# Patient Record
Sex: FEMALE | Race: OTHER | HISPANIC OR LATINO | Employment: PART TIME | URBAN - METROPOLITAN AREA
[De-identification: names, ages, dates, MRNs, and addresses within clinical notes are randomized per-mention and may not be internally consistent; named-entity substitution may affect disease eponyms.]

---

## 2018-04-05 ENCOUNTER — OFFICE VISIT (OUTPATIENT)
Dept: FAMILY MEDICINE CLINIC | Facility: CLINIC | Age: 38
End: 2018-04-05
Payer: COMMERCIAL

## 2018-04-05 VITALS
RESPIRATION RATE: 14 BRPM | HEART RATE: 66 BPM | HEIGHT: 64 IN | SYSTOLIC BLOOD PRESSURE: 130 MMHG | BODY MASS INDEX: 32.67 KG/M2 | TEMPERATURE: 97.2 F | WEIGHT: 191.38 LBS | DIASTOLIC BLOOD PRESSURE: 98 MMHG

## 2018-04-05 DIAGNOSIS — R63.5 WEIGHT GAIN: ICD-10-CM

## 2018-04-05 DIAGNOSIS — K50.919 CROHN'S DISEASE WITH COMPLICATION, UNSPECIFIED GASTROINTESTINAL TRACT LOCATION (HCC): Primary | ICD-10-CM

## 2018-04-05 DIAGNOSIS — G47.00 INSOMNIA, UNSPECIFIED TYPE: ICD-10-CM

## 2018-04-05 DIAGNOSIS — K58.2 IRRITABLE BOWEL SYNDROME WITH BOTH CONSTIPATION AND DIARRHEA: ICD-10-CM

## 2018-04-05 DIAGNOSIS — K21.9 GASTROESOPHAGEAL REFLUX DISEASE WITHOUT ESOPHAGITIS: ICD-10-CM

## 2018-04-05 PROBLEM — K85.90 PANCREATITIS: Status: ACTIVE | Noted: 2017-05-07

## 2018-04-05 PROBLEM — K50.90 CROHN DISEASE (HCC): Status: ACTIVE | Noted: 2017-05-07

## 2018-04-05 PROBLEM — K58.9 IBS (IRRITABLE BOWEL SYNDROME): Status: ACTIVE | Noted: 2018-01-24

## 2018-04-05 PROCEDURE — 99204 OFFICE O/P NEW MOD 45 MIN: CPT | Performed by: FAMILY MEDICINE

## 2018-04-05 RX ORDER — DICYCLOMINE HCL 20 MG
20 TABLET ORAL EVERY 6 HOURS
COMMUNITY
Start: 2018-01-25 | End: 2018-04-05 | Stop reason: CLARIF

## 2018-04-05 RX ORDER — PANTOPRAZOLE SODIUM 40 MG/1
40 TABLET, DELAYED RELEASE ORAL DAILY
Qty: 30 TABLET | Refills: 5 | Status: SHIPPED | OUTPATIENT
Start: 2018-04-05 | End: 2018-08-29 | Stop reason: SDUPTHER

## 2018-04-05 RX ORDER — PANTOPRAZOLE SODIUM 40 MG/1
40 TABLET, DELAYED RELEASE ORAL
COMMUNITY
Start: 2018-01-26 | End: 2018-04-05 | Stop reason: SDUPTHER

## 2018-04-05 NOTE — PROGRESS NOTES
Assessment/Plan:    IBS (irritable bowel syndrome)  Trial levsin    Gastroesophageal reflux disease without esophagitis  Refill protonix, await gi eval    Crohn disease ([facility] [address] )  Await lab       Diagnoses and all orders for this visit:    Crohn's disease with complication, unspecified gastrointestinal tract location [facility])  -     Ambulatory referral to Gastroenterology; Future  -     CBC and differential; Future  -     Sedimentation rate, automated; Future  -     C-reactive protein; Future  -     Comprehensive metabolic panel; Future    Irritable bowel syndrome with both constipation and diarrhea  -     hyoscyamine (LEVSIN/SL) 0 125 mg SL tablet; Take 1 tablet (0 125 mg total) by mouth every 6 (six) hours as needed for cramping  -     Ambulatory referral to Gastroenterology; Future    Gastroesophageal reflux disease without esophagitis  -     pantoprazole (PROTONIX) 40 mg tablet; Take 1 tablet (40 mg total) by mouth daily  -     Ambulatory referral to Gastroenterology; Future    Insomnia, unspecified type    Weight gain  -     TSH, 3rd generation; Future  -     Insulin, fasting; Future  -     Cortisol Level, AM Specimen; Future  -     Lipid Panel with Direct LDL reflex; Future          Subjective:      Patient ID: Tamiko Rater is a 40 y o  female  Abdominal Pain   This is a chronic problem  The current episode started more than 1 year ago  The onset quality is sudden  The problem occurs constantly  The problem has been gradually worsening  The pain is located in the generalized abdominal region  The pain is at a severity of 4/10  The pain is moderate  The quality of the pain is colicky and cramping  Associated symptoms include belching, constipation, diarrhea, flatus and nausea  Pertinent negatives include no anorexia or vomiting  The pain is aggravated by bowel movement  Treatments tried: had been on Humira but insurance ran out  The treatment provided mild relief   Prior diagnostic workup includes GI consult, lower endoscopy, upper endoscopy and CT scan  Her past medical history is significant for Crohn's disease and irritable bowel syndrome  The following portions of the patient's history were reviewed and updated as appropriate: allergies, current medications, past family history, past medical history, past social history, past surgical history and problem list     Review of Systems   Constitutional: Positive for activity change and unexpected weight change  Eyes: Negative for visual disturbance  Respiratory: Negative for shortness of breath  Cardiovascular: Positive for chest pain  Gastrointestinal: Positive for abdominal pain, constipation, diarrhea, flatus and nausea  Negative for anorexia and vomiting  Psychiatric/Behavioral: Positive for sleep disturbance  Objective:      /98 (BP Location: Right arm, Patient Position: Sitting, Cuff Size: Adult)   Pulse 66   Temp (!) 97 2 °F (36 2 °C) (Temporal)   Resp 14   Ht 5' 4 01" (1 626 m)   Wt 86 8 kg (191 lb 6 oz)   LMP 02/01/2018   BMI 32 84 kg/m²          Physical Exam   Constitutional: She appears well-developed and well-nourished  She appears ill  uncomfortable   Neck: No thyromegaly present  Cardiovascular: Normal rate, regular rhythm and normal heart sounds  Pulmonary/Chest: Breath sounds normal    Abdominal: She exhibits distension  There is tenderness  Lymphadenopathy:     She has no cervical adenopathy

## 2018-04-11 ENCOUNTER — TELEPHONE (OUTPATIENT)
Dept: FAMILY MEDICINE CLINIC | Facility: CLINIC | Age: 38
End: 2018-04-11

## 2018-04-11 ENCOUNTER — APPOINTMENT (OUTPATIENT)
Dept: LAB | Facility: MEDICAL CENTER | Age: 38
End: 2018-04-11
Payer: COMMERCIAL

## 2018-04-11 DIAGNOSIS — R63.5 WEIGHT GAIN: ICD-10-CM

## 2018-04-11 DIAGNOSIS — K50.919 CROHN'S DISEASE WITH COMPLICATION, UNSPECIFIED GASTROINTESTINAL TRACT LOCATION (HCC): ICD-10-CM

## 2018-04-11 LAB
ALBUMIN SERPL BCP-MCNC: 3.7 G/DL (ref 3.5–5)
ALP SERPL-CCNC: 95 U/L (ref 46–116)
ALT SERPL W P-5'-P-CCNC: 33 U/L (ref 12–78)
ANION GAP SERPL CALCULATED.3IONS-SCNC: 4 MMOL/L (ref 4–13)
AST SERPL W P-5'-P-CCNC: 17 U/L (ref 5–45)
BASOPHILS # BLD AUTO: 0.03 THOUSANDS/ΜL (ref 0–0.1)
BASOPHILS NFR BLD AUTO: 0 % (ref 0–1)
BILIRUB SERPL-MCNC: 0.25 MG/DL (ref 0.2–1)
BUN SERPL-MCNC: 14 MG/DL (ref 5–25)
CALCIUM SERPL-MCNC: 8.1 MG/DL
CHLORIDE SERPL-SCNC: 107 MMOL/L (ref 100–108)
CHOLEST SERPL-MCNC: 174 MG/DL (ref 50–200)
CO2 SERPL-SCNC: 28 MMOL/L (ref 21–32)
CORTIS AM PEAK SERPL-MCNC: 10.7 UG/DL (ref 4.2–22.4)
CREAT SERPL-MCNC: 0.74 MG/DL (ref 0.6–1.3)
CRP SERPL QL: 3.4 MG/L
EOSINOPHIL # BLD AUTO: 0.11 THOUSAND/ΜL (ref 0–0.61)
EOSINOPHIL NFR BLD AUTO: 2 % (ref 0–6)
ERYTHROCYTE [DISTWIDTH] IN BLOOD BY AUTOMATED COUNT: 13.2 % (ref 11.6–15.1)
ERYTHROCYTE [SEDIMENTATION RATE] IN BLOOD: 9 MM/HOUR (ref 0–20)
GFR SERPL CREATININE-BSD FRML MDRD: 104 ML/MIN/1.73SQ M
GLUCOSE P FAST SERPL-MCNC: 90 MG/DL (ref 65–99)
HCT VFR BLD AUTO: 36.1 % (ref 34.8–46.1)
HDLC SERPL-MCNC: 38 MG/DL (ref 40–60)
HGB BLD-MCNC: 12.3 G/DL (ref 11.5–15.4)
INSULIN SERPL-ACNC: 19.6 MU/L (ref 3–25)
LDLC SERPL DIRECT ASSAY-MCNC: 73 MG/DL (ref 0–100)
LYMPHOCYTES # BLD AUTO: 3.63 THOUSANDS/ΜL (ref 0.6–4.47)
LYMPHOCYTES NFR BLD AUTO: 50 % (ref 14–44)
MCH RBC QN AUTO: 29.7 PG (ref 26.8–34.3)
MCHC RBC AUTO-ENTMCNC: 34.1 G/DL (ref 31.4–37.4)
MCV RBC AUTO: 87 FL (ref 82–98)
MONOCYTES # BLD AUTO: 0.42 THOUSAND/ΜL (ref 0.17–1.22)
MONOCYTES NFR BLD AUTO: 6 % (ref 4–12)
NEUTROPHILS # BLD AUTO: 2.97 THOUSANDS/ΜL (ref 1.85–7.62)
NEUTS SEG NFR BLD AUTO: 42 % (ref 43–75)
NRBC BLD AUTO-RTO: 0 /100 WBCS
PLATELET # BLD AUTO: 256 THOUSANDS/UL (ref 149–390)
PMV BLD AUTO: 10.3 FL (ref 8.9–12.7)
POTASSIUM SERPL-SCNC: 3.8 MMOL/L (ref 3.5–5.3)
PROT SERPL-MCNC: 7.6 G/DL (ref 6.4–8.2)
RBC # BLD AUTO: 4.14 MILLION/UL (ref 3.81–5.12)
SODIUM SERPL-SCNC: 139 MMOL/L (ref 136–145)
TRIGL SERPL-MCNC: 431 MG/DL
TSH SERPL DL<=0.05 MIU/L-ACNC: 3.12 UIU/ML (ref 0.36–3.74)
WBC # BLD AUTO: 7.17 THOUSAND/UL (ref 4.31–10.16)

## 2018-04-11 PROCEDURE — 36415 COLL VENOUS BLD VENIPUNCTURE: CPT

## 2018-04-11 PROCEDURE — 86140 C-REACTIVE PROTEIN: CPT

## 2018-04-11 PROCEDURE — 84443 ASSAY THYROID STIM HORMONE: CPT

## 2018-04-11 PROCEDURE — 85025 COMPLETE CBC W/AUTO DIFF WBC: CPT

## 2018-04-11 PROCEDURE — 82533 TOTAL CORTISOL: CPT

## 2018-04-11 PROCEDURE — 80061 LIPID PANEL: CPT

## 2018-04-11 PROCEDURE — 83721 ASSAY OF BLOOD LIPOPROTEIN: CPT

## 2018-04-11 PROCEDURE — 83525 ASSAY OF INSULIN: CPT

## 2018-04-11 PROCEDURE — 85652 RBC SED RATE AUTOMATED: CPT

## 2018-04-11 PROCEDURE — 80053 COMPREHEN METABOLIC PANEL: CPT

## 2018-04-11 NOTE — TELEPHONE ENCOUNTER
----- Message from Leonard Whelan MD sent at 4/11/2018  2:25 PM EDT -----  Triglycerides very high-rec ov to review options

## 2018-04-11 NOTE — TELEPHONE ENCOUNTER
Patient notified           ----- Message from Lavonne Herrmann MD sent at 4/11/2018  4:12 PM EDT -----  Sed rate nl

## 2018-04-13 ENCOUNTER — OFFICE VISIT (OUTPATIENT)
Dept: FAMILY MEDICINE CLINIC | Facility: CLINIC | Age: 38
End: 2018-04-13
Payer: COMMERCIAL

## 2018-04-13 VITALS
HEART RATE: 70 BPM | DIASTOLIC BLOOD PRESSURE: 82 MMHG | RESPIRATION RATE: 18 BRPM | BODY MASS INDEX: 32.44 KG/M2 | HEIGHT: 64 IN | WEIGHT: 190 LBS | SYSTOLIC BLOOD PRESSURE: 114 MMHG | TEMPERATURE: 97.2 F

## 2018-04-13 DIAGNOSIS — E78.1 HYPERTRIGLYCERIDEMIA: ICD-10-CM

## 2018-04-13 DIAGNOSIS — K62.89 ANUSITIS: Primary | ICD-10-CM

## 2018-04-13 PROBLEM — M51.37 DDD (DEGENERATIVE DISC DISEASE), LUMBOSACRAL: Status: ACTIVE | Noted: 2018-04-13

## 2018-04-13 PROBLEM — K50.10 CROHN'S COLITIS (HCC): Status: ACTIVE | Noted: 2017-02-06

## 2018-04-13 PROCEDURE — 99213 OFFICE O/P EST LOW 20 MIN: CPT | Performed by: FAMILY MEDICINE

## 2018-04-13 NOTE — PROGRESS NOTES
Assessment/Plan:    No problem-specific Assessment & Plan notes found for this encounter  There are no diagnoses linked to this encounter  Subjective:      Patient ID: Alexx Mcfarland is a 40 y o  female  Hyperlipidemia   This is a new problem  This is a new diagnosis  The problem is uncontrolled  Recent lipid tests were reviewed and are high (triglycerides)  Exacerbated by: family history  Pertinent negatives include no chest pain or shortness of breath  (2 epiosodes of pancreatitis) She is currently on no antihyperlipidemic treatment  There are no compliance problems  The following portions of the patient's history were reviewed and updated as appropriate: allergies, current medications, past family history, past medical history, past social history, past surgical history and problem list     Review of Systems   Constitutional: Negative for unexpected weight change  Respiratory: Negative for shortness of breath  Cardiovascular: Negative for chest pain  Gastrointestinal: Positive for abdominal pain and rectal pain  Neurological: Negative for headaches  Psychiatric/Behavioral: The patient is not nervous/anxious  Objective:      /82 (BP Location: Left arm, Patient Position: Sitting, Cuff Size: Adult)   Pulse 70   Temp (!) 97 2 °F (36 2 °C) (Temporal)   Resp 18   Ht 5' 4 01" (1 626 m)   Wt 86 2 kg (190 lb)   LMP 02/27/2018   Breastfeeding? No   BMI 32 60 kg/m²          Physical Exam   Constitutional: She appears well-developed and well-nourished  Neck: No thyromegaly present  Cardiovascular: Normal rate, regular rhythm and normal heart sounds  Pulmonary/Chest: Breath sounds normal    Abdominal: Soft  Bowel sounds are normal  She exhibits distension  Lymphadenopathy:     She has no cervical adenopathy

## 2018-04-16 ENCOUNTER — TELEPHONE (OUTPATIENT)
Dept: FAMILY MEDICINE CLINIC | Facility: CLINIC | Age: 38
End: 2018-04-16

## 2018-04-16 NOTE — TELEPHONE ENCOUNTER
Spoke with nutritional services and they said if high cholesterol is what you want to use as a dx you can  They said it may not be a covered service but if it's the correct dx it is fine  She said the pt  Can ask to speak to someone in charge because the person I spoke to doesn't know why they told her she couldn't schedule an appt

## 2018-04-16 NOTE — TELEPHONE ENCOUNTER
I would like to know [facility] [address]

## 2018-04-16 NOTE — TELEPHONE ENCOUNTER
You did a script for pt  For nutritional counseling but linked it to her high cholesterol  They will not see her with that dx it has to have a nutritional dx code for them to see her

## 2018-04-17 ENCOUNTER — OFFICE VISIT (OUTPATIENT)
Dept: GASTROENTEROLOGY | Facility: MEDICAL CENTER | Age: 38
End: 2018-04-17
Payer: COMMERCIAL

## 2018-04-17 ENCOUNTER — TELEPHONE (OUTPATIENT)
Dept: FAMILY MEDICINE CLINIC | Facility: CLINIC | Age: 38
End: 2018-04-17

## 2018-04-17 VITALS
BODY MASS INDEX: 32.61 KG/M2 | DIASTOLIC BLOOD PRESSURE: 84 MMHG | HEART RATE: 75 BPM | HEIGHT: 64 IN | SYSTOLIC BLOOD PRESSURE: 118 MMHG | TEMPERATURE: 96.6 F | WEIGHT: 191 LBS

## 2018-04-17 DIAGNOSIS — R10.32 LEFT LOWER QUADRANT PAIN: Primary | ICD-10-CM

## 2018-04-17 DIAGNOSIS — R10.13 EPIGASTRIC PAIN: ICD-10-CM

## 2018-04-17 DIAGNOSIS — K50.919 CROHN'S DISEASE WITH COMPLICATION, UNSPECIFIED GASTROINTESTINAL TRACT LOCATION (HCC): ICD-10-CM

## 2018-04-17 DIAGNOSIS — K21.9 GASTROESOPHAGEAL REFLUX DISEASE WITHOUT ESOPHAGITIS: ICD-10-CM

## 2018-04-17 DIAGNOSIS — K50.119 CROHN'S DISEASE OF COLON WITH COMPLICATION (HCC): ICD-10-CM

## 2018-04-17 DIAGNOSIS — K58.0 IRRITABLE BOWEL SYNDROME WITH DIARRHEA: ICD-10-CM

## 2018-04-17 DIAGNOSIS — E78.1 HYPERTRIGLYCERIDEMIA: Primary | ICD-10-CM

## 2018-04-17 PROCEDURE — 99244 OFF/OP CNSLTJ NEW/EST MOD 40: CPT | Performed by: INTERNAL MEDICINE

## 2018-04-17 RX ORDER — AMITRIPTYLINE HYDROCHLORIDE 10 MG/1
10 TABLET, FILM COATED ORAL
Qty: 30 TABLET | Refills: 3 | Status: SHIPPED | OUTPATIENT
Start: 2018-04-17 | End: 2018-08-28 | Stop reason: SDUPTHER

## 2018-04-17 NOTE — LETTER
April 17, 2018     Cruz Mccracken MD  [address]    Patient: Dora Dee   YOB: 1980   Date of Visit: 4/17/2018       Dear Dr Brooklyn Kim:    Thank you for referring Dora Dee to me for evaluation  Below are my notes for this consultation  If you have questions, please do not hesitate to call me  I look forward to following your patient along with you  Sincerely,      CANDE Belle  [facility]  Mobile: [phone]  Available on [email or web address]  [email or web address]  [email or web address]           CC: No Recipients  Brian Goss MD  4/17/2018  9:22 PM  Sign at close encounter  [facility] - Outpatient Consultation  Dora Dee 40 y o  female MRN: [id number]  Encounter: [id number]      PCP: Cruz Mccracken MD  Referring: Cruz Mccracken MD  [address], [address]      ASSESSMENT AND PLAN:      1  Left lower quadrant pain  2  Irritable bowel syndrome with diarrhea  Patient complains of abdominal pain and diarrhea  It is unclear if she feels association of the pain with her bowel habits which is required for the diagnosis of irritable bowel syndrome  She is status post cholecystectomy and there may be a component of bile acid diarrhea  It is likely that this could be chronic abdominal pain syndrome  Rule out infectious studies with stool studies as below  Will start Elavil low dose 10 milligrams at bedtime, she is warned of the possible side effects which include constipation and sedation   - Ambulatory referral to Gastroenterology  - amitriptyline (ELAVIL) 10 mg tablet; Take 1 tablet (10 mg total) by mouth daily at bedtime  Dispense: 30 tablet; Refill: 3  - Clostridium difficile toxin by PCR Future  - Stool Enteric Bacterial Panel by PCR; Future  - Giardia, EIA, Ova/Parasite; Future    3  Gastroesophageal reflux disease without esophagitis  4   Epigastric pain  Recent EGD performed in January 2018 [facility] in demonstrating distal reflux esophagitis  Continue Protonix  The patient is encouraged to recognize her dietary triggers and avoid them  5  Crohn's disease with complication, unspecified gastrointestinal tract location [facility])  Recent C reactive protein and sedimentation rate in within normal limits  Will obtain Fecal calprotectin to evaluate for activity of disease  Records of release signed, to obtain outside colonoscopy report documenting extent and severity of Crohn's colitis  - Calprotectin,Fecal; Future  - Ambulatory referral to Gastroenterology      ______________________________________________________________________    HPI:      Patient is a 70-year-old female referred to me for evaluation of abdominal pain, diarrhea  She has a remote history of Crohn's colitis diagnosed approximately six years ago following surgical complication of uterine perforation which resulted in intestinal perforation and she was found to have Crohn's colitis at that time  She was previously taking 6-MP, Humira, prednisone is no longer on these medications for several years due to loss of insurance  She has additionally been diagnosed with irritable bowel syndrome and gastroesophageal reflux disease  She complains of chronic epigastric and left lower quadrant abdominal pain that is associated with eating  She has chronic diarrhea, as well  She relates up to 10-15 bowel movements per day and then two days of constipation with alternation of diarrhea continuing  Pain persists and is not improved with bowel movements or anti spasmodic treatments  She is currently taking Levsin 0 125 milligram sublingually as needed, she states this medication is limited in its effect due to sedation  She is also taking Protonix for epigastric pain without relief  She was recently admitted to [facility] and for epigastric pain and underwent EGD in January 2018 which demonstrated reflux esophagitis    Her treatment of Crohn's disease was performed [place] in [place] and are unavailable for me to review  She states her last colonoscopy was within the last year and demonstrated normal tissue reportedly  REVIEW OF SYSTEMS:    CONSTITUTIONAL: Denies any fever, chills, rigors, and weight loss  HEENT: No earache or tinnitus  Denies hearing loss or visual disturbances  CARDIOVASCULAR: No chest pain or palpitations  RESPIRATORY: Denies any cough, hemoptysis, shortness of breath or dyspnea on exertion  GASTROINTESTINAL: As noted in the History of Present Illness  GENITOURINARY: No problems with urination  Denies any hematuria or dysuria  NEUROLOGIC: No dizziness or vertigo, denies headaches  MUSCULOSKELETAL: Denies any muscle or joint pain  SKIN: Denies skin rashes or itching  ENDOCRINE: Denies excessive thirst  Denies intolerance to heat or cold  PSYCHOSOCIAL: Denies depression or anxiety  Denies any recent memory loss         Historical Information   Past Medical History:   Diagnosis Date    Asthma     Crohn disease ([facility] [address] )     DDD (degenerative disc disease), lumbosacral 2018    Gastroesophageal reflux disease without esophagitis 2018    Pneumonia     Sickle cell trait (HCC)     Urinary tract infection     Visual impairment      Past Surgical History:   Procedure Laterality Date     SECTION      CHOLECYSTECTOMY      LIPOSUCTION      SMALL INTESTINE SURGERY      TONSILLECTOMY       Social History   History   Alcohol Use    Yes     Comment: Occassionally     History   Drug Use No     History   Smoking Status    Never Smoker   Smokeless Tobacco    Never Used     Family History   Problem Relation Age of Onset    Hepatitis Mother     Thyroid disease Mother     Diabetes Father     Hypertension Father     Hyperlipidemia Father     Cataracts Father        Meds/Allergies       Current Outpatient Prescriptions:     hyoscyamine (LEVSIN/SL) 0 125 mg SL tablet    pantoprazole (PROTONIX) 40 mg tablet    amitriptyline (ELAVIL) 10 mg tablet    Allergies   Allergen Reactions    Aspirin Other (See Comments)     "I was a baby, I have no idea"           Objective     Blood pressure 118/84, pulse 75, temperature (!) 96 6 °F (35 9 °C), temperature source Tympanic, height 5' 4" (1 626 m), weight 86 6 kg (191 lb), not currently breastfeeding  Body mass index is 32 79 kg/m²  PHYSICAL EXAM:      General Appearance:   Alert, cooperative, no distress   HEENT:   Normocephalic, atraumatic, anicteric      Neck:  Supple, symmetrical, trachea midline   Lungs:   Clear to auscultation bilaterally; no rales, rhonchi or wheezing; respirations unlabored    Heart[de-identified]   Regular rate and rhythm; no murmur, rub, or gallop  Abdomen:   Soft, non-tender, non-distended; normal bowel sounds; no masses, no organomegaly    Genitalia:   Deferred    Rectal:   Deferred    Extremities:  No cyanosis, clubbing or edema    Pulses:  2+ and symmetric    Skin:  No jaundice, rashes, or lesions    Lymph nodes:  No palpable cervical lymphadenopathy        Lab Results:     Lab Results   Component Value Date    WBC 7 17 04/11/2018    HGB 12 3 04/11/2018    HCT 36 1 04/11/2018    MCV 87 04/11/2018     04/11/2018       Lab Results   Component Value Date     04/11/2018    K 3 8 04/11/2018     04/11/2018    CO2 28 04/11/2018    ANIONGAP 4 04/11/2018    BUN 14 04/11/2018    CREATININE 0 74 04/11/2018    GLUF 90 04/11/2018    CALCIUM 8 1 04/11/2018    AST 17 04/11/2018    ALT 33 04/11/2018    ALKPHOS 95 04/11/2018    PROT 7 6 04/11/2018    BILITOT 0 25 04/11/2018    EGFR 104 04/11/2018       No results found for: INR, PROTIME      Radiology Results:   CT abdomen/pelvis with IV contrast 1/23/2018: [facility]  Impression:   No acute findings in the abdomen or pelvis     Result Narrative   History: Diffuse abdominal tenderness      Exam: CT of the abdomen and pelvis with IV contrast      Technique: Using helical technique, axial images were obtained through the abdomen and pelvis following administration of 100 cc of Omnipaque 350  intravenous contrast  Coronal and sagittal reformations were performed      Comparison: CT abdomen pelvis, 5/6/2017        Abdomen:   Lung Bases: Visualized lung bases are clear   The heart is normal in size  There is no pericardial or pleural effusion  Liver: Normal in size and contour without focal mass  Gallbladder/Bile ducts: The gallbladder is surgically absent  Spleen: Normal     Pancreas: Normal     Adrenal glands: Normal     Kidneys: No evidence of hydronephrosis, renal calculus or solid renal mass  Stable small right renal cyst  Partially duplex left renal collecting system  Bowel/Mesentery: Loops of large and small bowel are normal in caliber without  wall thickening or evidence of obstruction  The appendix is visualized and  appears within normal limits  Stable postsurgical change with anastomosis in the  mid pelvis  There is no free fluid   No free air  Lymph nodes: Normal     Vessels: Normal     Abdominal Wall: Normal      Pelvis:  Uterus appears within normal limits  Involuting left ovarian follicle  Urinary Bladder: Normal     Lymph nodes:  Normal       Bones: There is no acute osseous abnormality identified

## 2018-04-17 NOTE — PROGRESS NOTES
[facility] - Outpatient Consultation  Louise Mobley 40 y o  female MRN: [id number]  Encounter: [id number]      PCP: Fleurette Severin, MD  Referring: Fleurette Severin, MD  [address], [address]      ASSESSMENT AND PLAN:      1  Left lower quadrant pain  2  Irritable bowel syndrome with diarrhea  Patient complains of abdominal pain and diarrhea  It is unclear if she feels association of the pain with her bowel habits which is required for the diagnosis of irritable bowel syndrome  She is status post cholecystectomy and there may be a component of bile acid diarrhea  It is likely that this could be chronic abdominal pain syndrome  Rule out infectious studies with stool studies as below  Will start Elavil low dose 10 milligrams at bedtime, she is warned of the possible side effects which include constipation and sedation   - Ambulatory referral to Gastroenterology  - amitriptyline (ELAVIL) 10 mg tablet; Take 1 tablet (10 mg total) by mouth daily at bedtime  Dispense: 30 tablet; Refill: 3  - Clostridium difficile toxin by PCR Future  - Stool Enteric Bacterial Panel by PCR; Future  - Giardia, EIA, Ova/Parasite; Future    3  Gastroesophageal reflux disease without esophagitis  4  Epigastric pain  Recent EGD performed in January 2018 [facility] in demonstrating distal reflux esophagitis  Continue Protonix  The patient is encouraged to recognize her dietary triggers and avoid them  5  Crohn's disease with complication, unspecified gastrointestinal tract location [facility])  Recent C reactive protein and sedimentation rate in within normal limits  Will obtain Fecal calprotectin to evaluate for activity of disease  Records of release signed, to obtain outside colonoscopy report documenting extent and severity of Crohn's colitis    - Calprotectin,Fecal; Future  - Ambulatory referral to Gastroenterology      ______________________________________________________________________    HPI: Patient is a 15-year-old female referred to me for evaluation of abdominal pain, diarrhea  She has a remote history of Crohn's colitis diagnosed approximately six years ago following surgical complication of uterine perforation which resulted in intestinal perforation and she was found to have Crohn's colitis at that time  She was previously taking 6-MP, Humira, prednisone is no longer on these medications for several years due to loss of insurance  She has additionally been diagnosed with irritable bowel syndrome and gastroesophageal reflux disease  She complains of chronic epigastric and left lower quadrant abdominal pain that is associated with eating  She has chronic diarrhea, as well  She relates up to 10-15 bowel movements per day and then two days of constipation with alternation of diarrhea continuing  Pain persists and is not improved with bowel movements or anti spasmodic treatments  She is currently taking Levsin 0 125 milligram sublingually as needed, she states this medication is limited in its effect due to sedation  She is also taking Protonix for epigastric pain without relief  She was recently admitted to [facility] and for epigastric pain and underwent EGD in January 2018 which demonstrated reflux esophagitis  Her treatment of Crohn's disease was performed [facility] in [facility] and are unavailable for me to review  She states her last colonoscopy was within the last year and demonstrated normal tissue reportedly  REVIEW OF SYSTEMS:    CONSTITUTIONAL: Denies any fever, chills, rigors, and weight loss  HEENT: No earache or tinnitus  Denies hearing loss or visual disturbances  CARDIOVASCULAR: No chest pain or palpitations  RESPIRATORY: Denies any cough, hemoptysis, shortness of breath or dyspnea on exertion  GASTROINTESTINAL: As noted in the History of Present Illness  GENITOURINARY: No problems with urination  Denies any hematuria or dysuria    NEUROLOGIC: No dizziness or vertigo, denies headaches  MUSCULOSKELETAL: Denies any muscle or joint pain  SKIN: Denies skin rashes or itching  ENDOCRINE: Denies excessive thirst  Denies intolerance to heat or cold  PSYCHOSOCIAL: Denies depression or anxiety  Denies any recent memory loss  Historical Information   Past Medical History:   Diagnosis Date    Asthma     Crohn disease ([address] )     DDD (degenerative disc disease), lumbosacral 2018    Gastroesophageal reflux disease without esophagitis 2018    Pneumonia     Sickle cell trait (HCC)     Urinary tract infection     Visual impairment      Past Surgical History:   Procedure Laterality Date     SECTION      CHOLECYSTECTOMY      LIPOSUCTION      SMALL INTESTINE SURGERY      TONSILLECTOMY       Social History   History   Alcohol Use    Yes     Comment: Occassionally     History   Drug Use No     History   Smoking Status    Never Smoker   Smokeless Tobacco    Never Used     Family History   Problem Relation Age of Onset    Hepatitis Mother     Thyroid disease Mother     Diabetes Father     Hypertension Father     Hyperlipidemia Father     Cataracts Father        Meds/Allergies       Current Outpatient Prescriptions:     hyoscyamine (LEVSIN/SL) 0 125 mg SL tablet    pantoprazole (PROTONIX) 40 mg tablet    amitriptyline (ELAVIL) 10 mg tablet    Allergies   Allergen Reactions    Aspirin Other (See Comments)     "I was a baby, I have no idea"           Objective     Blood pressure 118/84, pulse 75, temperature (!) 96 6 °F (35 9 °C), temperature source Tympanic, height 5' 4" (1 626 m), weight 86 6 kg (191 lb), not currently breastfeeding  Body mass index is 32 79 kg/m²       PHYSICAL EXAM:      General Appearance:   Alert, cooperative, no distress   HEENT:   Normocephalic, atraumatic, anicteric      Neck:  Supple, symmetrical, trachea midline   Lungs:   Clear to auscultation bilaterally; no rales, rhonchi or wheezing; respirations unlabored    Heart[de-identified]   Regular rate and rhythm; no murmur, rub, or gallop  Abdomen:   Soft, non-tender, non-distended; normal bowel sounds; no masses, no organomegaly    Genitalia:   Deferred    Rectal:   Deferred    Extremities:  No cyanosis, clubbing or edema    Pulses:  2+ and symmetric    Skin:  No jaundice, rashes, or lesions    Lymph nodes:  No palpable cervical lymphadenopathy        Lab Results:     Lab Results   Component Value Date    WBC 7 17 04/11/2018    HGB 12 3 04/11/2018    HCT 36 1 04/11/2018    MCV 87 04/11/2018     04/11/2018       Lab Results   Component Value Date     04/11/2018    K 3 8 04/11/2018     04/11/2018    CO2 28 04/11/2018    ANIONGAP 4 04/11/2018    BUN 14 04/11/2018    CREATININE 0 74 04/11/2018    GLUF 90 04/11/2018    CALCIUM 8 1 04/11/2018    AST 17 04/11/2018    ALT 33 04/11/2018    ALKPHOS 95 04/11/2018    PROT 7 6 04/11/2018    BILITOT 0 25 04/11/2018    EGFR 104 04/11/2018       No results found for: INR, PROTIME      Radiology Results:   CT abdomen/pelvis with IV contrast 1/23/2018: [facility]  Impression:   No acute findings in the abdomen or pelvis  Result Narrative   History: Diffuse abdominal tenderness      Exam: CT of the abdomen and pelvis with IV contrast      Technique: Using helical technique, axial images were obtained through the   abdomen and pelvis following administration of 100 cc of Omnipaque 350  intravenous contrast  Coronal and sagittal reformations were performed      Comparison: CT abdomen pelvis, 5/6/2017        Abdomen:   Lung Bases: Visualized lung bases are clear   The heart is normal in size  There is no pericardial or pleural effusion  Liver: Normal in size and contour without focal mass  Gallbladder/Bile ducts: The gallbladder is surgically absent  Spleen: Normal     Pancreas: Normal     Adrenal glands: Normal     Kidneys: No evidence of hydronephrosis, renal calculus or solid renal mass    Stable small right renal cyst  Partially duplex left renal collecting system  Bowel/Mesentery: Loops of large and small bowel are normal in caliber without  wall thickening or evidence of obstruction  The appendix is visualized and  appears within normal limits  Stable postsurgical change with anastomosis in the  mid pelvis  There is no free fluid   No free air  Lymph nodes: Normal     Vessels: Normal     Abdominal Wall: Normal      Pelvis:  Uterus appears within normal limits  Involuting left ovarian follicle  Urinary Bladder: Normal     Lymph nodes:  Normal       Bones: There is no acute osseous abnormality identified

## 2018-04-17 NOTE — TELEPHONE ENCOUNTER
As per pt she is not able to make an appointment until a referral is generated in pt chart  Referral Dx should be for high cholesterol evaluation for Nutritional counseling  Pls advise when referral has been made to notify pt

## 2018-04-18 ENCOUNTER — LAB (OUTPATIENT)
Dept: LAB | Facility: MEDICAL CENTER | Age: 38
End: 2018-04-18
Payer: COMMERCIAL

## 2018-04-18 DIAGNOSIS — K62.89 ANUSITIS: ICD-10-CM

## 2018-04-18 DIAGNOSIS — R10.32 LEFT LOWER QUADRANT PAIN: ICD-10-CM

## 2018-04-18 DIAGNOSIS — K58.0 IRRITABLE BOWEL SYNDROME WITH DIARRHEA: ICD-10-CM

## 2018-04-18 PROCEDURE — 83993 ASSAY FOR CALPROTECTIN FECAL: CPT

## 2018-04-18 PROCEDURE — 87329 GIARDIA AG IA: CPT

## 2018-04-18 PROCEDURE — 87493 C DIFF AMPLIFIED PROBE: CPT

## 2018-04-18 PROCEDURE — 87209 SMEAR COMPLEX STAIN: CPT

## 2018-04-18 PROCEDURE — 87177 OVA AND PARASITES SMEARS: CPT

## 2018-04-18 PROCEDURE — 87505 NFCT AGENT DETECTION GI: CPT

## 2018-04-19 ENCOUNTER — TELEPHONE (OUTPATIENT)
Dept: FAMILY MEDICINE CLINIC | Facility: CLINIC | Age: 38
End: 2018-04-19

## 2018-04-19 LAB
C DIFF TOX GENS STL QL NAA+PROBE: NORMAL
CAMPYLOBACTER DNA SPEC NAA+PROBE: NORMAL
SALMONELLA DNA SPEC QL NAA+PROBE: NORMAL
SHIGA TOXIN STX GENE SPEC NAA+PROBE: NORMAL
SHIGELLA DNA SPEC QL NAA+PROBE: NORMAL

## 2018-04-19 NOTE — TELEPHONE ENCOUNTER
[facility] weight management called in stating they need Dr Chinedu Zimmerman to add a diagnosis to the script which is  Z71 3    When done it needs to be faxed to [phone]

## 2018-04-20 DIAGNOSIS — Z71.3 DIETARY COUNSELING AND SURVEILLANCE: Primary | ICD-10-CM

## 2018-04-20 LAB — O+P STL CONC: NORMAL

## 2018-04-20 NOTE — TELEPHONE ENCOUNTER
This is code for nutrtion eval prior to bariatric surgery-pt not doing surgery so this is incorrect-contact nutritional services and confirm what they really want

## 2018-04-21 LAB — CALPROTECTIN STL-MCNT: 51 UG/G (ref 0–120)

## 2018-04-22 ENCOUNTER — OFFICE VISIT (OUTPATIENT)
Dept: URGENT CARE | Facility: MEDICAL CENTER | Age: 38
End: 2018-04-22
Payer: COMMERCIAL

## 2018-04-22 VITALS
TEMPERATURE: 97.8 F | WEIGHT: 189 LBS | OXYGEN SATURATION: 98 % | HEART RATE: 77 BPM | DIASTOLIC BLOOD PRESSURE: 76 MMHG | RESPIRATION RATE: 18 BRPM | HEIGHT: 65 IN | BODY MASS INDEX: 31.49 KG/M2 | SYSTOLIC BLOOD PRESSURE: 115 MMHG

## 2018-04-22 DIAGNOSIS — N39.0 URINARY TRACT INFECTION WITHOUT HEMATURIA, SITE UNSPECIFIED: Primary | ICD-10-CM

## 2018-04-22 DIAGNOSIS — R30.0 DYSURIA: ICD-10-CM

## 2018-04-22 LAB
SL AMB  POCT GLUCOSE, UA: NEGATIVE
SL AMB LEUKOCYTE ESTERASE,UA: ABNORMAL
SL AMB POCT BILIRUBIN,UA: NEGATIVE
SL AMB POCT BLOOD,UA: NEGATIVE
SL AMB POCT CLARITY,UA: CLEAR
SL AMB POCT COLOR,UA: YELLOW
SL AMB POCT KETONES,UA: NEGATIVE
SL AMB POCT NITRITE,UA: NEGATIVE
SL AMB POCT PH,UA: 6
SL AMB POCT SPECIFIC GRAVITY,UA: 1.01
SL AMB POCT URINE PROTEIN: NEGATIVE
SL AMB POCT UROBILINOGEN: NEGATIVE

## 2018-04-22 PROCEDURE — 81002 URINALYSIS NONAUTO W/O SCOPE: CPT | Performed by: PHYSICIAN ASSISTANT

## 2018-04-22 PROCEDURE — 99212 OFFICE O/P EST SF 10 MIN: CPT | Performed by: PHYSICIAN ASSISTANT

## 2018-04-22 PROCEDURE — 87086 URINE CULTURE/COLONY COUNT: CPT | Performed by: PHYSICIAN ASSISTANT

## 2018-04-22 RX ORDER — SULFAMETHOXAZOLE AND TRIMETHOPRIM 800; 160 MG/1; MG/1
1 TABLET ORAL EVERY 12 HOURS SCHEDULED
Qty: 10 TABLET | Refills: 0 | Status: SHIPPED | OUTPATIENT
Start: 2018-04-22 | End: 2018-04-27

## 2018-04-22 RX ORDER — PHENAZOPYRIDINE HYDROCHLORIDE 200 MG/1
200 TABLET, FILM COATED ORAL
Qty: 10 TABLET | Refills: 0 | Status: SHIPPED | OUTPATIENT
Start: 2018-04-22 | End: 2018-04-24

## 2018-04-22 NOTE — PATIENT INSTRUCTIONS

## 2018-04-22 NOTE — PROGRESS NOTES
[facility]        NAME: Maddie Hull is a 40 y o  female  : 1980    MRN: [id number]  DATE: 2018  TIME: 12:19 PM    Assessment and Plan   Urinary tract infection without hematuria, site unspecified [N39 0]  1  Urinary tract infection without hematuria, site unspecified  sulfamethoxazole-trimethoprim (BACTRIM DS) 800-160 mg per tablet    phenazopyridine (PYRIDIUM) 200 mg tablet   2  Dysuria  POCT urine dip    Urine culture         Patient Instructions       Follow up with PCP in 3-5 days  Proceed to  ER if symptoms worsen  Chief Complaint     Chief Complaint   Patient presents with    Difficulty Urinating     Patient c/o dysuria x 3 days          History of Present Illness       Difficulty Urinating    This is a new problem  The current episode started in the past 7 days  The problem occurs every urination  The problem has been gradually worsening  The pain is mild  There has been no fever  She is sexually active  There is no history of pyelonephritis  Associated symptoms include frequency, hesitancy and urgency  Pertinent negatives include no chills, discharge, flank pain, hematuria, nausea, possible pregnancy or sweats  There is no history of catheterization, kidney stones, recurrent UTIs, a single kidney, urinary stasis or a urological procedure  Last UTI over a year ago  Review of Systems   Review of Systems   Constitutional: Negative for chills  Gastrointestinal: Negative for nausea  Genitourinary: Positive for dysuria, frequency, hesitancy and urgency  Negative for flank pain and hematuria           Current Medications       Current Outpatient Prescriptions:     amitriptyline (ELAVIL) 10 mg tablet, Take 1 tablet (10 mg total) by mouth daily at bedtime, Disp: 30 tablet, Rfl: 3    hyoscyamine (LEVSIN/SL) 0 125 mg SL tablet, Take 1 tablet (0 125 mg total) by mouth every 6 (six) hours as needed for cramping, Disp: 60 tablet, Rfl: 2    pantoprazole (PROTONIX) 40 mg tablet, Take 1 tablet (40 mg total) by mouth daily, Disp: 30 tablet, Rfl: 5    phenazopyridine (PYRIDIUM) 200 mg tablet, Take 1 tablet (200 mg total) by mouth 3 (three) times a day with meals for 2 days, Disp: 10 tablet, Rfl: 0    sulfamethoxazole-trimethoprim (BACTRIM DS) 800-160 mg per tablet, Take 1 tablet by mouth every 12 (twelve) hours for 5 days, Disp: 10 tablet, Rfl: 0    Current Allergies     Allergies as of 2018 - Reviewed 2018   Allergen Reaction Noted    Aspirin Other (See Comments) 2017            The following portions of the patient's history were reviewed and updated as appropriate: allergies, current medications, past family history, past medical history, past social history, past surgical history and problem list      Past Medical History:   Diagnosis Date    Asthma     [facility] disease ([facility] [address] )     DDD (degenerative disc disease), lumbosacral 2018    Gastroesophageal reflux disease without esophagitis 2018    Pneumonia     [facility] cell trait ([facility] )     Urinary tract infection     Visual impairment        Past Surgical History:   Procedure Laterality Date     SECTION      CHOLECYSTECTOMY      LIPOSUCTION      SMALL INTESTINE SURGERY      TONSILLECTOMY         Family History   Problem Relation Age of Onset    Hepatitis Mother     Thyroid disease Mother     Diabetes Father     Hypertension Father     Hyperlipidemia Father     Cataracts Father          Medications have been verified  Objective   /76   Pulse 77   Temp 97 8 °F (36 6 °C)   Resp 18   Ht 5' 5" (1 651 m)   Wt 85 7 kg (189 lb)   SpO2 98%   BMI 31 45 kg/m²        Physical Exam     Physical Exam   Constitutional: She appears well-developed and well-nourished  Cardiovascular: Normal rate and regular rhythm  Pulmonary/Chest: Effort normal and breath sounds normal    Abdominal: Soft  Bowel sounds are normal    Nursing note and vitals reviewed  Mild suprapubic tenderness  No CVA tenderness

## 2018-04-23 ENCOUNTER — TELEPHONE (OUTPATIENT)
Dept: FAMILY MEDICINE CLINIC | Facility: CLINIC | Age: 38
End: 2018-04-23

## 2018-04-23 LAB
BACTERIA UR CULT: NORMAL
G LAMBLIA AG STL QL IA: NEGATIVE

## 2018-04-23 NOTE — TELEPHONE ENCOUNTER
lmtcb    ----- Message from Alee Starr MD sent at 4/22/2018 12:06 PM EDT -----  Stool for parasites negative

## 2018-04-25 ENCOUNTER — TELEPHONE (OUTPATIENT)
Dept: GASTROENTEROLOGY | Facility: CLINIC | Age: 38
End: 2018-04-25

## 2018-04-25 ENCOUNTER — TELEPHONE (OUTPATIENT)
Dept: FAMILY MEDICINE CLINIC | Facility: CLINIC | Age: 38
End: 2018-04-25

## 2018-04-25 DIAGNOSIS — R10.84 GENERALIZED ABDOMINAL PAIN: ICD-10-CM

## 2018-04-25 DIAGNOSIS — R19.7 DIARRHEA, UNSPECIFIED TYPE: Primary | ICD-10-CM

## 2018-04-25 DIAGNOSIS — K50.00 CROHN'S DISEASE OF ILEUM WITHOUT COMPLICATION (HCC): ICD-10-CM

## 2018-04-25 RX ORDER — MONTELUKAST SODIUM 4 MG/1
1 TABLET, CHEWABLE ORAL 2 TIMES DAILY
Qty: 60 TABLET | Refills: 3 | Status: ON HOLD | OUTPATIENT
Start: 2018-04-25 | End: 2018-12-07 | Stop reason: ALTCHOICE

## 2018-04-25 NOTE — TELEPHONE ENCOUNTER
Dr Marcelina Masters pt    Pt is requesting a call back from dr Marcelina Masters in regards to medication  She was in the er yesterday for chrons, she is still in pain and would like to know if a medication can be called in to her pharmacy   Please advise [phone]

## 2018-04-25 NOTE — PROGRESS NOTES
Patient went to ED yesterday  She complains of increasing abdominal pain  She also had 7 bowel movements yesterday  Her pain was not improved by NPO, nor levsin  She has not had an improvement from amitriptyline, only sedation effects  I now have access to her [facility] records where capsule endoscopy demonstrates linear ulceration of the proximal ileum consistent with small intestinal Crohn's disease  She not on medication for Crohn's disease  Recommended she have her fecal calprotectin checked  Increase amitriptyline to 20 mg QHS  Start colestipol 30 mins AC BID  Will order CT enterography to evaluate for small intestinal Crohn's disease

## 2018-04-27 RX ORDER — HYDROCODONE BITARTRATE AND ACETAMINOPHEN 5; 325 MG/1; MG/1
1 TABLET ORAL EVERY 6 HOURS
COMMUNITY
Start: 2018-04-24 | End: 2018-12-18

## 2018-04-27 RX ORDER — PREDNISONE 20 MG/1
40 TABLET ORAL
COMMUNITY
Start: 2018-04-24 | End: 2018-04-30

## 2018-04-27 RX ORDER — PHENAZOPYRIDINE HYDROCHLORIDE 200 MG/1
200 TABLET, FILM COATED ORAL 3 TIMES DAILY
Status: ON HOLD | COMMUNITY
End: 2018-12-07 | Stop reason: ALTCHOICE

## 2018-05-04 ENCOUNTER — TELEPHONE (OUTPATIENT)
Dept: FAMILY MEDICINE CLINIC | Facility: CLINIC | Age: 38
End: 2018-05-04

## 2018-05-04 NOTE — TELEPHONE ENCOUNTER
Was seeing pt for follow up from [facility] and when I tried to gently tell her that I would prefer eval at [place] [address] she stated [facility] only 5 minutes from my house  Suggested pt might be better served then by [facility] doctor if that is the case that she will not follow my recommendations   Pt stormed out of the room without being seen

## 2018-05-08 ENCOUNTER — TELEPHONE (OUTPATIENT)
Dept: GASTROENTEROLOGY | Facility: AMBULARY SURGERY CENTER | Age: 38
End: 2018-05-08

## 2018-05-08 NOTE — TELEPHONE ENCOUNTER
DR CANTU'S PT    Pt called requesting advise for symptoms  Pt stated she was prescribed night time medication for acid reflux, so during the day she is in pain  Pt stated she don't want to go to the er

## 2018-05-09 NOTE — TELEPHONE ENCOUNTER
Patient called with complaints of worsening heartburn and reflux, especially at night  She states that it is keeping her up at night  She is prescribed Protonix 40 mg daily but states that she takes it 3 times a day  She says she has also tried otc, stanley-seltzer, Tums & pharmacy brand heartburn medication (she's unsure what it actually is)  I reviewed conservative measures with her including dietary restrictions, elevating HOB and not eating within 3 hrs of bed time  She says that she does all of these things  Her next f/u is on 5/23/18  Please advise

## 2018-05-10 DIAGNOSIS — R10.13 EPIGASTRIC PAIN: Primary | ICD-10-CM

## 2018-05-10 RX ORDER — FAMOTIDINE 40 MG/1
40 TABLET, FILM COATED ORAL 2 TIMES DAILY PRN
Qty: 60 TABLET | Refills: 3 | Status: SHIPPED | OUTPATIENT
Start: 2018-05-10 | End: 2018-08-28 | Stop reason: SDUPTHER

## 2018-05-10 NOTE — PROGRESS NOTES
Spoke to patient was having reflux, heartburn, epigastric pain associated with meals  She has started a bland diet is eating even baked chicken salad which cause her pain  She has started taking the Protonix 40 mg TID with over-the-counter [place], antacid, Tums without improvement of her pain  She is not having benefit from Levsin  She is finding mild relief of her abdominal pain with Elavil, increased dosing  She has started the colestipol t I d  AC which has resolved her diarrhea, 1 bowel movement daily      -   Will increase Elavil to 30 mg daily, she is instructed to take three tabs at night  -  Will prescribe prescription dose Pepcid 40 mg daily   -   She is encouraged to follow-up bland diet and avoid the foods that cause her reflux   -   I encouraged her to contact [facility] to schedule her CT enterography

## 2018-06-05 ENCOUNTER — TELEPHONE (OUTPATIENT)
Dept: FAMILY MEDICINE CLINIC | Facility: CLINIC | Age: 38
End: 2018-06-05

## 2018-06-05 NOTE — TELEPHONE ENCOUNTER
Left message on cell for patient to return our call regarding referral wq if she scheduled w/ nutritionist that dr Waldron Sic referred her out to

## 2018-06-08 ENCOUNTER — TELEPHONE (OUTPATIENT)
Dept: FAMILY MEDICINE CLINIC | Facility: CLINIC | Age: 38
End: 2018-06-08

## 2018-06-08 NOTE — TELEPHONE ENCOUNTER
LMTCB    Referral wq question:    Pt was referred to nutritionist  Dr Randall Camargo state she is going outside of  network  Will need name of provider/location etc  Thank you

## 2018-06-14 ENCOUNTER — HOSPITAL ENCOUNTER (OUTPATIENT)
Dept: CT IMAGING | Facility: HOSPITAL | Age: 38
Discharge: HOME | End: 2018-06-14
Attending: INTERNAL MEDICINE
Payer: COMMERCIAL

## 2018-06-14 DIAGNOSIS — R10.84 GENERALIZED ABDOMINAL PAIN: ICD-10-CM

## 2018-06-14 DIAGNOSIS — R19.7 DIARRHEA, UNSPECIFIED TYPE: ICD-10-CM

## 2018-06-14 DIAGNOSIS — K50.00 CROHN'S DISEASE OF ILEUM WITHOUT COMPLICATION (HCC): ICD-10-CM

## 2018-06-14 PROCEDURE — 74177 CT ABD & PELVIS W/CONTRAST: CPT

## 2018-06-14 RX ADMIN — IOHEXOL 100 ML: 350 INJECTION, SOLUTION INTRAVENOUS at 13:26

## 2018-06-20 NOTE — PROGRESS NOTES
Please call patient with imaging results - mild inflammation of the end of her small intestine consistent with her history of Crohn's disease  Note, I see her appointmetn scheduled with me in 1 week  Would not  at this time, but do highly encourage her to follow up for this visit

## 2018-08-22 ENCOUNTER — OFFICE VISIT (OUTPATIENT)
Dept: GASTROENTEROLOGY | Facility: MEDICAL CENTER | Age: 38
End: 2018-08-22
Payer: COMMERCIAL

## 2018-08-22 ENCOUNTER — LAB (OUTPATIENT)
Dept: LAB | Facility: MEDICAL CENTER | Age: 38
End: 2018-08-22
Attending: INTERNAL MEDICINE
Payer: COMMERCIAL

## 2018-08-22 VITALS
HEIGHT: 65 IN | TEMPERATURE: 97.1 F | WEIGHT: 184.6 LBS | SYSTOLIC BLOOD PRESSURE: 118 MMHG | DIASTOLIC BLOOD PRESSURE: 70 MMHG | HEART RATE: 70 BPM | BODY MASS INDEX: 30.75 KG/M2

## 2018-08-22 DIAGNOSIS — K50.011 CROHN'S DISEASE OF SMALL INTESTINE WITH RECTAL BLEEDING (HCC): Primary | ICD-10-CM

## 2018-08-22 DIAGNOSIS — E66.9 CLASS 1 OBESITY WITH BODY MASS INDEX (BMI) OF 30.0 TO 30.9 IN ADULT, UNSPECIFIED OBESITY TYPE, UNSPECIFIED WHETHER SERIOUS COMORBIDITY PRESENT: ICD-10-CM

## 2018-08-22 DIAGNOSIS — K50.011 CROHN'S DISEASE OF SMALL INTESTINE WITH RECTAL BLEEDING (HCC): ICD-10-CM

## 2018-08-22 PROCEDURE — 86480 TB TEST CELL IMMUN MEASURE: CPT

## 2018-08-22 PROCEDURE — 86705 HEP B CORE ANTIBODY IGM: CPT

## 2018-08-22 PROCEDURE — 99214 OFFICE O/P EST MOD 30 MIN: CPT | Performed by: INTERNAL MEDICINE

## 2018-08-22 PROCEDURE — 87340 HEPATITIS B SURFACE AG IA: CPT

## 2018-08-22 PROCEDURE — 87522 HEPATITIS C REVRS TRNSCRPJ: CPT

## 2018-08-22 PROCEDURE — 86704 HEP B CORE ANTIBODY TOTAL: CPT

## 2018-08-22 PROCEDURE — 36415 COLL VENOUS BLD VENIPUNCTURE: CPT

## 2018-08-22 PROCEDURE — 86803 HEPATITIS C AB TEST: CPT

## 2018-08-22 NOTE — LETTER
August 26, 2018     Sylvia Muir, [address] [employer] [facility]    Patient: Naveed Rangel   YOB: 1980   Date of Visit: 8/22/2018       Dear Dr Rachid Pathak:    Thank you for referring Naveed Rangel to me for evaluation  Below are my notes for this consultation  If you have questions, please do not hesitate to call me  I look forward to following your patient along with you  Sincerely,    CANDE Mccain  [facility]  Mobile: [phone]  Available on [email or web address]           CC: No Recipients  Roc Ferguson MD  8/26/2018  4:52 PM  Sign at close encounter  [facility] - Outpatient Consultation  Naveed Rangel 45 y o  female MRN: [id number]  Encounter: [id number]      PCP: Nomi Hollingsworth MD  Referring: No referring provider defined for this encounter  ASSESSMENT AND PLAN:      1  Crohn's disease of small intestine with rectal bleeding (HCC)  Symptoms of diarrhea and abdominal pain  Normal CRP, sed rate  Negative infectious studies  Objective evidence of disease only seen on CT enterography  Previous treatment with budesonide (weight gain) and humira (malaise, fatigue) limited by side effects  Concern for development of antibody development if re-treated with anti-TNF and inability to attend infusion therapies given social stressors/job  Will obtain workup for immunosuppresive treatment with hepatitis and TB testing at this time  Will submit insurance approval for Stelara  In the interim, she is encouraged to continue/titrate up on imodium colestipol to treat her diarrhea/abdominal pain symptoms  - Chronic Hepatitis Panel; Future  - Quantiferon TB Gold; Future  - Hepatitis C RNA, quantitative, PCR; Future  - ustekinumab (STELARA) 130 MG/26ML SOLN; Infuse 390 mg into a venous catheter once for 1 dose  Dispense: 78 mL; Refill: 0  - ustekinumab (STELARA) 90 mg/mL subcutaneous injection;  Inject 1 mL (90 mg total) under the skin once for 1 dose 8 weeks after infusion dose; then every 8 weeks thereafter  Dispense: 1 mL; Refill: 4    2  Class 1 obesity with body mass index (BMI) of 30 0 to 30 9 in adult, unspecified obesity type, unspecified whether serious comorbidity present  - Ambulatory referral to Weight Management; Future      ______________________________________________________________________    HPI:      Patient is a 45year old female who sees me in follow of Crohn's disease and diarrhea  Her last appointment was in April 2018  Since then, she complains of continued profuse diarrhea occurring up to 22 times/day  This limits her ability to perform her job as a   Occasionally, she has improved days of symptoms with 3-10 bowel movements  Abdominal cramping, bloating, and fatigue accompany diarrhea  Bowel movements are primarily nonbloody, but she did develop rectal bleeding recently, prompting evaluation at [facility] ER and subsequent admission  She underwent CT abdomen/pelvis there, which was normal  Sed rate, CRP, and stool studies (including C diff) in April 2018 were negative  Her CT enterography in June 2018 ordered by me demonstrates persistent terminal ileal bowel wall enhancement consistent with Crohn's inflammation/disease  In regards to her previous GI treatment, I have obtained her records from [facility] in [place], [place]  She has documented Crohn's disease isolated to the small intestine  She tried and failed therapy with pentasa  Treatment with budesonide was limited by significant weight gain and has prompted bariatric surgery evaluation at [facility]  She was treated with humira which resulted in severe fatigue, lethargy and malaise  She states her symptoms were improved, but side effects limited QOL  She relates difficulty scheduling infusion therapies if needed   There was a question of overlap IBS-D symptoms, which failed therapy with imodium, cholestyramine and metamucil  She received 2 week course of rifaximin 550 mg BID in 2016 which did not benefit her symptoms  REVIEW OF SYSTEMS:    CONSTITUTIONAL: Denies any fever, chills, rigors, and weight loss  HEENT: No earache or tinnitus  Denies hearing loss or visual disturbances  CARDIOVASCULAR: No chest pain or palpitations  RESPIRATORY: Denies any cough, hemoptysis, shortness of breath or dyspnea on exertion  GASTROINTESTINAL: As noted in the History of Present Illness  GENITOURINARY: No problems with urination  Denies any hematuria or dysuria  NEUROLOGIC: No dizziness or vertigo, denies headaches  MUSCULOSKELETAL: Denies any muscle or joint pain  SKIN: Denies skin rashes or itching  ENDOCRINE: Denies excessive thirst  Denies intolerance to heat or cold  PSYCHOSOCIAL: Denies depression or anxiety  Denies any recent memory loss         Historical Information   Past Medical History:   Diagnosis Date    Asthma     Crohn disease ([facility] [address] [facility]     DDD (degenerative disc disease), lumbosacral 2018    Gastroesophageal reflux disease without esophagitis 2018    Pneumonia     Sickle cell trait (HCC)     Urinary tract infection     Visual impairment      Past Surgical History:   Procedure Laterality Date     SECTION      CHOLECYSTECTOMY      LIPOSUCTION      SMALL INTESTINE SURGERY      TONSILLECTOMY       Social History   History   Alcohol Use    Yes     Comment: Occassionally     History   Drug Use No     History   Smoking Status    Never Smoker   Smokeless Tobacco    Never Used     Family History   Problem Relation Age of Onset    Hepatitis Mother     Thyroid disease Mother     Diabetes Father     Hypertension Father     Hyperlipidemia Father     Cataracts Father        Meds/Allergies       Current Outpatient Prescriptions:     amitriptyline (ELAVIL) 10 mg tablet    colestipol (COLESTID) 1 g tablet    famotidine (PEPCID) 40 MG tablet    HYDROcodone-acetaminophen ([address]) 5-325 mg per tablet    hyoscyamine (LEVSIN/SL) 0 125 mg SL tablet    pantoprazole (PROTONIX) 40 mg tablet    phenazopyridine (PYRIDIUM) 200 mg tablet    ustekinumab (STELARA) 130 MG/26ML SOLN    ustekinumab (STELARA) 90 mg/mL subcutaneous injection    Allergies   Allergen Reactions    Aspirin Other (See Comments)     "I was a baby, I have no idea"           Objective     Blood pressure 118/70, pulse 70, temperature (!) 97 1 °F (36 2 °C), temperature source Tympanic, height 5' 5" (1 651 m), weight 83 7 kg (184 lb 9 6 oz), not currently breastfeeding  Body mass index is 30 72 kg/m²  PHYSICAL EXAM:      General Appearance:   Alert, cooperative, no distress   HEENT:   Normocephalic, atraumatic, anicteric      Neck:  Supple, symmetrical, trachea midline   Lungs:   Clear to auscultation bilaterally; no rales, rhonchi or wheezing; respirations unlabored    Heart[de-identified]   Regular rate and rhythm; no murmur, rub, or gallop     Abdomen:   Soft, non-tender, non-distended; normal bowel sounds; no masses, no organomegaly    Genitalia:   Deferred    Rectal:   Deferred    Extremities:  No cyanosis, clubbing or edema    Pulses:  2+ and symmetric    Skin:  No jaundice, rashes, or lesions    Lymph nodes:  No palpable cervical lymphadenopathy        Lab Results:     Lab Results   Component Value Date    WBC 7 17 04/11/2018    HGB 12 3 04/11/2018    HCT 36 1 04/11/2018    MCV 87 04/11/2018     04/11/2018       Lab Results   Component Value Date     04/11/2018    K 3 8 04/11/2018     04/11/2018    CO2 28 04/11/2018    ANIONGAP 4 04/11/2018    BUN 14 04/11/2018    CREATININE 0 74 04/11/2018    GLUF 90 04/11/2018    CALCIUM 8 1 04/11/2018    AST 17 04/11/2018    ALT 33 04/11/2018    ALKPHOS 95 04/11/2018    PROT 7 6 04/11/2018    BILITOT 0 25 04/11/2018    EGFR 104 04/11/2018       No results found for: INR, PROTIME      Radiology Results:     CT ABDOMEN AND PELVIS WITH IV CONTRAST- ENTEROGRAPHY - WITH CONTRAST 6/14/2018:   SMALL BOWEL: Abnormal bowel wall enhancement noted in the terminal ileum best appreciated on series 601 images 61-67 in keeping with Crohn's disease  Small bowel is normal in caliber  Postsurgical changes involving pelvic small bowel loops with   anastomosis noted  IMPRESSION:  Abnormal bowel wall enhancement noted in the terminal ileum best appreciated on series 601 images 61-67 in keeping with provided history of Crohn's disease

## 2018-08-22 NOTE — PROGRESS NOTES
[facility] - Outpatient Consultation  Daniel Baker 45 y o  female MRN: [id number]  Encounter: [id number]      PCP: Robles Justice MD  Referring: No referring provider defined for this encounter  ASSESSMENT AND PLAN:      1  Crohn's disease of small intestine with rectal bleeding (HCC)  Symptoms of diarrhea and abdominal pain  Normal CRP, sed rate  Negative infectious studies  Objective evidence of disease only seen on CT enterography  Previous treatment with budesonide (weight gain) and humira (malaise, fatigue) limited by side effects  Concern for development of antibody development if re-treated with anti-TNF and inability to attend infusion therapies given social stressors/job  Will obtain workup for immunosuppresive treatment with hepatitis and TB testing at this time  Will submit insurance approval for Stelara  In the interim, she is encouraged to continue/titrate up on imodium colestipol to treat her diarrhea/abdominal pain symptoms  - Chronic Hepatitis Panel; Future  - Quantiferon TB Gold; Future  - Hepatitis C RNA, quantitative, PCR; Future  - ustekinumab (STELARA) 130 MG/26ML SOLN; Infuse 390 mg into a venous catheter once for 1 dose  Dispense: 78 mL; Refill: 0  - ustekinumab (STELARA) 90 mg/mL subcutaneous injection; Inject 1 mL (90 mg total) under the skin once for 1 dose 8 weeks after infusion dose; then every 8 weeks thereafter  Dispense: 1 mL; Refill: 4    2  Class 1 obesity with body mass index (BMI) of 30 0 to 30 9 in adult, unspecified obesity type, unspecified whether serious comorbidity present  - Ambulatory referral to Weight Management; Future      ______________________________________________________________________    HPI:      Patient is a 45year old female who sees me in follow of Crohn's disease and diarrhea  Her last appointment was in April 2018  Since then, she complains of continued profuse diarrhea occurring up to 22 times/day   This limits her ability to perform her job as a   Occasionally, she has improved days of symptoms with 3-10 bowel movements  Abdominal cramping, bloating, and fatigue accompany diarrhea  Bowel movements are primarily nonbloody, but she did develop rectal bleeding recently, prompting evaluation at [facility] ER and subsequent admission  She underwent CT abdomen/pelvis there, which was normal  Sed rate, CRP, and stool studies (including C diff) in April 2018 were negative  Her CT enterography in June 2018 ordered by me demonstrates persistent terminal ileal bowel wall enhancement consistent with Crohn's inflammation/disease  In regards to her previous GI treatment, I have obtained her records from [facility] in [place], [place]  She has documented Crohn's disease isolated to the small intestine  She tried and failed therapy with pentasa  Treatment with budesonide was limited by significant weight gain and has prompted bariatric surgery evaluation at [facility]  She was treated with humira which resulted in severe fatigue, lethargy and malaise  She states her symptoms were improved, but side effects limited QOL  She relates difficulty scheduling infusion therapies if needed  There was a question of overlap IBS-D symptoms, which failed therapy with imodium, cholestyramine and metamucil  She received 2 week course of rifaximin 550 mg BID in April 2016 which did not benefit her symptoms  REVIEW OF SYSTEMS:    CONSTITUTIONAL: Denies any fever, chills, rigors, and weight loss  HEENT: No earache or tinnitus  Denies hearing loss or visual disturbances  CARDIOVASCULAR: No chest pain or palpitations  RESPIRATORY: Denies any cough, hemoptysis, shortness of breath or dyspnea on exertion  GASTROINTESTINAL: As noted in the History of Present Illness  GENITOURINARY: No problems with urination  Denies any hematuria or dysuria  NEUROLOGIC: No dizziness or vertigo, denies headaches     MUSCULOSKELETAL: Denies any muscle or joint pain    SKIN: Denies skin rashes or itching  ENDOCRINE: Denies excessive thirst  Denies intolerance to heat or cold  PSYCHOSOCIAL: Denies depression or anxiety  Denies any recent memory loss  Historical Information   Past Medical History:   Diagnosis Date    Asthma     Crohn disease ([address] )     DDD (degenerative disc disease), lumbosacral 2018    Gastroesophageal reflux disease without esophagitis 2018    Pneumonia     Sickle cell trait (HCC)     Urinary tract infection     Visual impairment      Past Surgical History:   Procedure Laterality Date     SECTION      CHOLECYSTECTOMY      LIPOSUCTION      SMALL INTESTINE SURGERY      TONSILLECTOMY       Social History   History   Alcohol Use    Yes     Comment: Occassionally     History   Drug Use No     History   Smoking Status    Never Smoker   Smokeless Tobacco    Never Used     Family History   Problem Relation Age of Onset    Hepatitis Mother     Thyroid disease Mother     Diabetes Father     Hypertension Father     Hyperlipidemia Father     Cataracts Father        Meds/Allergies       Current Outpatient Prescriptions:     amitriptyline (ELAVIL) 10 mg tablet    colestipol (COLESTID) 1 g tablet    famotidine (PEPCID) 40 MG tablet    HYDROcodone-acetaminophen (NORCO) 5-325 mg per tablet    hyoscyamine (LEVSIN/SL) 0 125 mg SL tablet    pantoprazole (PROTONIX) 40 mg tablet    phenazopyridine (PYRIDIUM) 200 mg tablet    ustekinumab (STELARA) 130 MG/26ML SOLN    ustekinumab (STELARA) 90 mg/mL subcutaneous injection    Allergies   Allergen Reactions    Aspirin Other (See Comments)     "I was a baby, I have no idea"           Objective     Blood pressure 118/70, pulse 70, temperature (!) 97 1 °F (36 2 °C), temperature source Tympanic, height 5' 5" (1 651 m), weight 83 7 kg (184 lb 9 6 oz), not currently breastfeeding  Body mass index is 30 72 kg/m²       PHYSICAL EXAM:      General Appearance:   Alert, cooperative, no distress   HEENT:   Normocephalic, atraumatic, anicteric      Neck:  Supple, symmetrical, trachea midline   Lungs:   Clear to auscultation bilaterally; no rales, rhonchi or wheezing; respirations unlabored    Heart[de-identified]   Regular rate and rhythm; no murmur, rub, or gallop  Abdomen:   Soft, non-tender, non-distended; normal bowel sounds; no masses, no organomegaly    Genitalia:   Deferred    Rectal:   Deferred    Extremities:  No cyanosis, clubbing or edema    Pulses:  2+ and symmetric    Skin:  No jaundice, rashes, or lesions    Lymph nodes:  No palpable cervical lymphadenopathy        Lab Results:     Lab Results   Component Value Date    WBC 7 17 04/11/2018    HGB 12 3 04/11/2018    HCT 36 1 04/11/2018    MCV 87 04/11/2018     04/11/2018       Lab Results   Component Value Date     04/11/2018    K 3 8 04/11/2018     04/11/2018    CO2 28 04/11/2018    ANIONGAP 4 04/11/2018    BUN 14 04/11/2018    CREATININE 0 74 04/11/2018    GLUF 90 04/11/2018    CALCIUM 8 1 04/11/2018    AST 17 04/11/2018    ALT 33 04/11/2018    ALKPHOS 95 04/11/2018    PROT 7 6 04/11/2018    BILITOT 0 25 04/11/2018    EGFR 104 04/11/2018       No results found for: INR, PROTIME      Radiology Results:     CT ABDOMEN AND PELVIS WITH IV CONTRAST- ENTEROGRAPHY - WITH CONTRAST 6/14/2018:   SMALL BOWEL: Abnormal bowel wall enhancement noted in the terminal ileum best appreciated on series 601 images 61-67 in keeping with Crohn's disease  Small bowel is normal in caliber  Postsurgical changes involving pelvic small bowel loops with   anastomosis noted  IMPRESSION:  Abnormal bowel wall enhancement noted in the terminal ileum best appreciated on series 601 images 61-67 in keeping with provided history of Crohn's disease

## 2018-08-23 ENCOUNTER — DOCUMENTATION (OUTPATIENT)
Dept: GASTROENTEROLOGY | Facility: MEDICAL CENTER | Age: 38
End: 2018-08-23

## 2018-08-23 LAB
HBV CORE AB SER QL: NORMAL
HBV CORE IGM SER QL: NORMAL
HBV SURFACE AG SER QL: NORMAL
HCV AB SER QL: NORMAL

## 2018-08-24 DIAGNOSIS — R76.12 POSITIVE QUANTIFERON-TB GOLD TEST: Primary | ICD-10-CM

## 2018-08-24 LAB
ANNOTATION COMMENT IMP: ABNORMAL
GAMMA INTERFERON BACKGROUND BLD IA-ACNC: 0.12 IU/ML
HCV RNA SERPL NAA+PROBE-ACNC: NORMAL IU/ML
M TB IFN-G BLD-IMP: POSITIVE
M TB IFN-G CD4+ BCKGRND COR BLD-ACNC: 2.73 IU/ML
M TB IFN-G CD4+ T-CELLS BLD-ACNC: 2.85 IU/ML
MITOGEN IGNF BLD-ACNC: 7.35 IU/ML
QUANTIFERON-TB GOLD IN TUBE: NORMAL
SERVICE CMNT-IMP: ABNORMAL
TEST INFORMATION: NORMAL

## 2018-08-24 NOTE — PROGRESS NOTES
Spoke to patient regarding negative hepatitis panel  + quantiferon - she states she has a history of this and required 6 months treatment for TB in the past before starting humira  She had a follow up chest x-ray that was negative  I will recommend she follow up with ID   Discussed stelara treatment and pending insurance approval

## 2018-08-28 DIAGNOSIS — K21.9 GASTROESOPHAGEAL REFLUX DISEASE WITHOUT ESOPHAGITIS: ICD-10-CM

## 2018-08-28 DIAGNOSIS — K58.0 IRRITABLE BOWEL SYNDROME WITH DIARRHEA: ICD-10-CM

## 2018-08-28 DIAGNOSIS — K58.2 IRRITABLE BOWEL SYNDROME WITH BOTH CONSTIPATION AND DIARRHEA: ICD-10-CM

## 2018-08-28 DIAGNOSIS — R10.32 LEFT LOWER QUADRANT PAIN: ICD-10-CM

## 2018-08-28 DIAGNOSIS — R10.13 EPIGASTRIC PAIN: ICD-10-CM

## 2018-08-28 RX ORDER — PANTOPRAZOLE SODIUM 40 MG/1
40 TABLET, DELAYED RELEASE ORAL DAILY
Qty: 30 TABLET | Refills: 0 | OUTPATIENT
Start: 2018-08-28

## 2018-08-28 NOTE — TELEPHONE ENCOUNTER
From: Cain Boone  Sent: 8/28/2018 5:23 PM EDT  Subject: Medication Renewal Request    Cain Boone would like a refill of the following medications:     pantoprazole (PROTONIX) 40 mg tablet Gayathri Campo MD]     hyoscyamine (LEVSIN/SL) 0 125 mg SL tablet Gayathri Campo MD]    Preferred pharmacy: [place] [facility] [id number]    Comment:      Medication renewals requested in this message routed separately:     amitriptyline (ELAVIL) 10 mg tablet Sherrell Fajardo MD]     famotidine (PEPCID) 40 MG tablet Sherrell Fajardo MD]

## 2018-08-29 RX ORDER — PANTOPRAZOLE SODIUM 40 MG/1
40 TABLET, DELAYED RELEASE ORAL DAILY
Qty: 30 TABLET | Refills: 0 | Status: ON HOLD | OUTPATIENT
Start: 2018-08-29 | End: 2018-12-07 | Stop reason: ALTCHOICE

## 2018-08-29 RX ORDER — FAMOTIDINE 40 MG/1
40 TABLET, FILM COATED ORAL 2 TIMES DAILY PRN
Qty: 60 TABLET | Refills: 0 | Status: SHIPPED | OUTPATIENT
Start: 2018-08-29 | End: 2020-06-04 | Stop reason: SDUPTHER

## 2018-08-29 RX ORDER — AMITRIPTYLINE HYDROCHLORIDE 10 MG/1
10 TABLET, FILM COATED ORAL
Qty: 30 TABLET | Refills: 0 | Status: SHIPPED | OUTPATIENT
Start: 2018-08-29 | End: 2018-10-09

## 2018-08-29 NOTE — TELEPHONE ENCOUNTER
From: Al Olivera  Sent: 8/28/2018 5:23 PM EDT  Subject: Medication Renewal Request    Al Olivera would like a refill of the following medications:     amitriptyline (ELAVIL) 10 mg tablet Chandu Alexander MD]     famotidine (PEPCID) 40 MG tablet Chandu Alexander MD]    Preferred pharmacy: [id number]    Comment:      Medication renewals requested in this message routed separately:     pantoprazole (PROTONIX) 40 mg tablet Kevin Quinones MD]     hyoscyamine (LEVSIN/SL) 0 125 mg SL tablet Kevin Quinones MD]

## 2018-08-30 ENCOUNTER — TELEPHONE (OUTPATIENT)
Dept: GASTROENTEROLOGY | Facility: CLINIC | Age: 38
End: 2018-08-30

## 2018-08-30 ENCOUNTER — TELEPHONE (OUTPATIENT)
Dept: GASTROENTEROLOGY | Facility: AMBULARY SURGERY CENTER | Age: 38
End: 2018-08-30

## 2018-08-30 NOTE — TELEPHONE ENCOUNTER
Dr Shafer's pt called stating she is returning your call and will like for you to call her directly to discuss some issues she has been having  Please call pt to discuss

## 2018-08-30 NOTE — TELEPHONE ENCOUNTER
I called and left a message on their appeal line as that was the only option  Please let the patient know, so she can follow up with her insurance company as well     ----- Message from Gloria Sepulveda sent at 8/30/2018  2:18 PM EDT -----  Regarding: appeal  Patient has been denied Stelara, patient has tried and failed Humira/Budesonida and Pentasa  Per Dr Suni Lombardi, the patient failed Humira which is on the formulary      An appeal is initiated by calling or writing to the [facility] Department at [phone]  Patient ID# [id number]  Group # [id number]  Claim date: 8/25/2018

## 2018-09-04 DIAGNOSIS — K50.00 CROHN'S DISEASE OF SMALL INTESTINE WITHOUT COMPLICATION (HCC): Primary | ICD-10-CM

## 2018-09-04 RX ORDER — BUDESONIDE 3 MG/1
9 CAPSULE, COATED PELLETS ORAL DAILY
Qty: 90 CAPSULE | Refills: 0 | Status: SHIPPED | OUTPATIENT
Start: 2018-09-04 | End: 2018-10-04

## 2018-09-04 NOTE — TELEPHONE ENCOUNTER
I called the appeal line and left another voicemail  Will try to find a different way to contact the insurance company

## 2018-09-11 ENCOUNTER — TELEPHONE (OUTPATIENT)
Dept: GASTROENTEROLOGY | Facility: MEDICAL CENTER | Age: 38
End: 2018-09-11

## 2018-09-14 ENCOUNTER — TELEPHONE (OUTPATIENT)
Dept: GASTROENTEROLOGY | Facility: MEDICAL CENTER | Age: 38
End: 2018-09-14

## 2018-09-14 NOTE — TELEPHONE ENCOUNTER
Spoke to patient and scheduled her an appointment with Dr Anibal Adam on 10/9/18 at 2:00 pm  Mailed appointment  card to the patient

## 2018-10-08 DIAGNOSIS — K50.019 CROHN'S DISEASE OF SMALL INTESTINE WITH COMPLICATION (HCC): Primary | ICD-10-CM

## 2018-10-09 ENCOUNTER — OFFICE VISIT (OUTPATIENT)
Dept: GASTROENTEROLOGY | Facility: MEDICAL CENTER | Age: 38
End: 2018-10-09
Payer: COMMERCIAL

## 2018-10-09 VITALS
DIASTOLIC BLOOD PRESSURE: 74 MMHG | WEIGHT: 185 LBS | HEART RATE: 77 BPM | BODY MASS INDEX: 30.82 KG/M2 | SYSTOLIC BLOOD PRESSURE: 114 MMHG | HEIGHT: 65 IN | TEMPERATURE: 98.6 F

## 2018-10-09 DIAGNOSIS — K50.011 CROHN'S DISEASE OF SMALL INTESTINE WITH RECTAL BLEEDING (HCC): Primary | ICD-10-CM

## 2018-10-09 DIAGNOSIS — R19.7 DIARRHEA, UNSPECIFIED TYPE: ICD-10-CM

## 2018-10-09 DIAGNOSIS — R10.84 GENERALIZED ABDOMINAL PAIN: ICD-10-CM

## 2018-10-09 PROBLEM — K50.90 CROHN DISEASE (HCC): Status: RESOLVED | Noted: 2017-05-07 | Resolved: 2018-10-09

## 2018-10-09 PROCEDURE — 99245 OFF/OP CONSLTJ NEW/EST HI 55: CPT | Performed by: INTERNAL MEDICINE

## 2018-10-09 RX ORDER — DICYCLOMINE HCL 20 MG
20 TABLET ORAL EVERY 8 HOURS
Status: ON HOLD | COMMUNITY
Start: 2018-10-02 | End: 2018-12-07

## 2018-10-09 RX ORDER — DIPHENOXYLATE HYDROCHLORIDE AND ATROPINE SULFATE 2.5; .025 MG/1; MG/1
1 TABLET ORAL 4 TIMES DAILY PRN
Qty: 90 TABLET | Refills: 0 | Status: SHIPPED | OUTPATIENT
Start: 2018-10-09 | End: 2019-03-01

## 2018-10-09 NOTE — LETTER
October 9, 2018     Chelsi Muller [address] [place] [address]    Patient: Katlin Powell   YOB: 1980   Date of Visit: 10/9/2018       Dear Dr Roosevelt Shi:    Thank you for referring Katlin Powell to me for evaluation  Below are my notes for this consultation  If you have questions, please do not hesitate to call me  I look forward to following your patient along with you  Sincerely,      CANDE [facility]gel  [facility]  Mobile: [phone]  Available on [email or web address]           CC: DO Joel Moses MD  10/9/2018  5:11 PM  Sign at close encounter  [facility] - Outpatient Consultation  Katlin Powell 45 y o  female MRN: [id number]  Encounter: [id number]      PCP: Jacque Rodriguez DO  Referring: Delfin Nguyen MD  [facility], [address] [place] [address]      ASSESSMENT AND PLAN:      1  Crohn's disease of small intestine with rectal bleeding ([address] )  2  Generalized abdominal pain  3   Diarrhea, unspecified type  Symptoms of diarrhea and abdominal pain  Normal CRP, sed rate  Negative infectious studies  Objective evidence of disease seen on CT enterography  Failed Crohn's treatment with budesonide (weight gain) and humira (malaise, fatigue) limited by side effects and no improvement in symptoms  Failed symptomatic treatment with bentyl, elavil, colestipol, rifaximin (1 course), and imodium  Will plan for egd/colonoscopy to assess/evaluate extent of disease  Concern for development of antibody development if re-treated with anti-TNF and inability to attend infusion therapies given social stressors/job  Would benefit from Stelara treatment, but this is not approved by her insurance  Will prescribe cimzia per insurance dictation, 400 mg loading dose, q2, 4 weeks and then q4 weeks thereafter  Immunizations per primary  Lomotil for symptomatic treatment in the interim, if not beneficial may consider welchol given history of small intestinal resection  - diphenoxylate-atropine (LOMOTIL) 2 5-0 025 mg per tablet; Take 1 tablet by mouth 4 (four) times a day as needed for diarrhea  Dispense: 90 tablet; Refill: 0  - Case request operating room: EGD AND COLONOSCOPY; Standing  - Na Sulfate-K Sulfate-Mg Sulf (SUPREP BOWEL PREP KIT) 17 5-3 13-1 6 GM/180ML SOLN; Take 177 mL by mouth once for 1 dose  Dispense: 2 Bottle; Refill: 0  - Case request operating room: EGD AND COLONOSCOPY    ______________________________________________________________________    HPI:      Patient is a 45year old female who sees me in follow of Crohn's disease of the small intestine s/p resection and diarrhea, she s/p cholecystectomy  Her inflammatory markers including sed rate, CRP and fecal calprotectin are normal  Her CT enterography in June 2018 ordered by me demonstrates persistent terminal ileal bowel wall enhancement consistent with Crohn's inflammation/disease  She has failed therapy with humira, pentasa, budesonide, rifaximin, bentyl, colestipol, and imodium  She complains of persistent diarrhea occurring 10-15 times daily, limiting her quality of life as a   She has persistent, constant, diffuse abdominal pain with diarrhea  Pain is occasionally worst in the epigastrium and LLQ  Pain is uncontrolled with above interventions  Colestipol has somewhat helped diarrhea, she takes this twice daily  Other medication options have resulted in severe sedation  She takes bentyl occasionally on her days off  She has nausea associated with symptoms  She has lost 7 lbs unintentionally due to decreased ability to tolerate food  She notes irregular stools, including hard, black balls that look like "papaya seeds " This [facility] 10[facility]2/2018 with increase in LLQ abdominal pain, excess flatus/flatulence  CT abdomen/pelvis with IV/PO contrast normal on 10/2/2018    She states she was treated with morphine with improvement in pain and discharged  She was encouraged by her PCP to seek second opinion from my partner, Dr Rodrigue Sanchez  I explained the delay in treatment due to insurance issues  She has an appointment scheduled with Dr Rodrigue Sanchez in January and recognizes she will need to establish with Dr Rodrigue Sanchez if this her preference  REVIEW OF SYSTEMS:    CONSTITUTIONAL: Denies any fever, chills, rigors, and weight loss  HEENT: No earache or tinnitus  Denies hearing loss or visual disturbances  CARDIOVASCULAR: No chest pain or palpitations  RESPIRATORY: Denies any cough, hemoptysis, shortness of breath or dyspnea on exertion  GASTROINTESTINAL: As noted in the History of Present Illness  GENITOURINARY: No problems with urination  Denies any hematuria or dysuria  NEUROLOGIC: No dizziness or vertigo, denies headaches  MUSCULOSKELETAL: Denies any muscle or joint pain  SKIN: Denies skin rashes or itching  ENDOCRINE: Denies excessive thirst  Denies intolerance to heat or cold  PSYCHOSOCIAL: Denies depression or anxiety  Denies any recent memory loss         Historical Information   Past Medical History:   Diagnosis Date    Asthma     [facility] [facility]     DDD (degenerative disc disease), lumbosacral 2018    Gastroesophageal reflux disease without esophagitis 2018    Pneumonia     Sickle cell trait (HCC)     Urinary tract infection     Visual impairment      Past Surgical History:   Procedure Laterality Date     SECTION      CHOLECYSTECTOMY      LIPOSUCTION      SMALL INTESTINE SURGERY      TONSILLECTOMY       Social History   History   Alcohol Use    Yes     Comment: Occassionally     History   Drug Use No     History   Smoking Status    Never Smoker   Smokeless Tobacco    Never Used     Family History   Problem Relation Age of Onset    Hepatitis Mother     Thyroid disease Mother     Diabetes Father     Hypertension Father     Hyperlipidemia Father    [facility] Cataracts Father        Meds/Allergies       Current Outpatient Prescriptions:     Certolizumab Pegol 6 X 200 MG/ML KIT    colestipol (COLESTID) 1 g tablet    dicyclomine (BENTYL) 20 mg tablet    famotidine (PEPCID) 40 MG tablet    HYDROcodone-acetaminophen (NORCO) 5-325 mg per tablet    hyoscyamine (LEVSIN/SL) 0 125 mg SL tablet    pantoprazole (PROTONIX) 40 mg tablet    phenazopyridine (PYRIDIUM) 200 mg tablet    diphenoxylate-atropine (LOMOTIL) 2 5-0 025 mg per tablet    Na Sulfate-K Sulfate-Mg Sulf (SUPREP BOWEL PREP KIT) 17 5-3 13-1 6 GM/180ML SOLN    Allergies   Allergen Reactions    Aspirin Other (See Comments)     "I was a baby, I have no idea"           Objective     Blood pressure 114/74, pulse 77, temperature 98 6 °F (37 °C), temperature source Tympanic, height 5' 5" (1 651 m), weight 83 9 kg (185 lb), not currently breastfeeding  Body mass index is 30 79 kg/m²  PHYSICAL EXAM:      General Appearance:   Alert, cooperative, no distress   HEENT:   Normocephalic, atraumatic, anicteric      Neck:  Supple, symmetrical, trachea midline   Lungs:   Clear to auscultation bilaterally; no rales, rhonchi or wheezing; respirations unlabored    Heart[de-identified]   Regular rate and rhythm; no murmur, rub, or gallop     Abdomen:   Soft, generalized tenderness, non-distended; normal bowel sounds; no masses, no organomegaly    Genitalia:   Deferred    Rectal:   Deferred    Extremities:  No cyanosis, clubbing or edema    Pulses:  2+ and symmetric    Skin:  No jaundice, rashes, or lesions    Lymph nodes:  No palpable cervical lymphadenopathy        Lab Results:     Lab Results   Component Value Date    WBC 7 17 04/11/2018    HGB 12 3 04/11/2018    HCT 36 1 04/11/2018    MCV 87 04/11/2018     04/11/2018       Lab Results   Component Value Date     04/11/2018    K 3 8 04/11/2018     04/11/2018    CO2 28 04/11/2018    BUN 14 04/11/2018    CREATININE 0 74 04/11/2018    GLUF 90 04/11/2018    CALCIUM 8 1 04/11/2018    AST 17 04/11/2018    ALT 33 04/11/2018    ALKPHOS 95 04/11/2018    EGFR 104 04/11/2018       No results found for: INR, PROTIME      Radiology Results:   No results found

## 2018-10-09 NOTE — PROGRESS NOTES
[facility] [facility] - Outpatient Consultation  Donel Collet 45 y o  female MRN: [id number]  Encounter: [id number]      PCP: Nicky Van DO  Referring: Tri Davalos MD  [facility], [address] [place] [address]      ASSESSMENT AND PLAN:      1  Crohn's disease of small intestine with rectal bleeding ([address] )  2  Generalized abdominal pain  3  Diarrhea, unspecified type  Symptoms of diarrhea and abdominal pain  Normal CRP, sed rate  Negative infectious studies  Objective evidence of disease seen on CT enterography  Failed Crohn's treatment with budesonide (weight gain) and humira (malaise, fatigue) limited by side effects and no improvement in symptoms  Failed symptomatic treatment with bentyl, elavil, colestipol, rifaximin (1 course), and imodium  Will plan for egd/colonoscopy to assess/evaluate extent of disease  Concern for development of antibody development if re-treated with anti-TNF and inability to attend infusion therapies given social stressors/job  Would benefit from Stelara treatment, but this is not approved by her insurance  Will prescribe cimzia per insurance dictation, 400 mg loading dose, q2, 4 weeks and then q4 weeks thereafter  Immunizations per primary  Lomotil for symptomatic treatment in the interim, if not beneficial may consider welchol given history of small intestinal resection  - diphenoxylate-atropine (LOMOTIL) 2 5-0 025 mg per tablet; Take 1 tablet by mouth 4 (four) times a day as needed for diarrhea  Dispense: 90 tablet; Refill: 0  - Case request operating room: EGD AND COLONOSCOPY; Standing  - Na Sulfate-K Sulfate-Mg Sulf (SUPREP BOWEL PREP KIT) 17 5-3 13-1 6 GM/180ML SOLN; Take 177 mL by mouth once for 1 dose  Dispense: 2 Bottle;  Refill: 0  - Case request operating room: EGD AND COLONOSCOPY    ______________________________________________________________________    HPI:      Patient is a 45year old female who sees me in follow of Crohn's disease of the small intestine s/p resection and diarrhea, she s/p cholecystectomy  Her inflammatory markers including sed rate, CRP and fecal calprotectin are normal  Her CT enterography in June 2018 ordered by me demonstrates persistent terminal ileal bowel wall enhancement consistent with Crohn's inflammation/disease  She has failed therapy with humira, pentasa, budesonide, rifaximin, bentyl, colestipol, and imodium  She complains of persistent diarrhea occurring 10-15 times daily, limiting her quality of life as a   She has persistent, constant, diffuse abdominal pain with diarrhea  Pain is occasionally worst in the epigastrium and LLQ  Pain is uncontrolled with above interventions  Colestipol has somewhat helped diarrhea, she takes this twice daily  Other medication options have resulted in severe sedation  She takes bentyl occasionally on her days off  She has nausea associated with symptoms  She has lost 7 lbs unintentionally due to decreased ability to tolerate food  She notes irregular stools, including hard, black balls that look like "papaya seeds " This prompted her evaluation at ER [facility] twice on 7/27, 10/2/2018 with increase in LLQ abdominal pain, excess flatus/flatulence  CT abdomen/pelvis with IV/PO contrast normal on 10/2/2018  She states she was treated with morphine with improvement in pain and discharged  She was encouraged by her PCP to seek second opinion from my partner, Dr Denise Martinez  I explained the delay in treatment due to insurance issues  She has an appointment scheduled with Dr Denise Martinez in January and recognizes she will need to establish with Dr Denise Martinez if this her preference  REVIEW OF SYSTEMS:    CONSTITUTIONAL: Denies any fever, chills, rigors, and weight loss  HEENT: No earache or tinnitus  Denies hearing loss or visual disturbances  CARDIOVASCULAR: No chest pain or palpitations     RESPIRATORY: Denies any cough, hemoptysis, shortness of breath or dyspnea on exertion  GASTROINTESTINAL: As noted in the History of Present Illness  GENITOURINARY: No problems with urination  Denies any hematuria or dysuria  NEUROLOGIC: No dizziness or vertigo, denies headaches  MUSCULOSKELETAL: Denies any muscle or joint pain  SKIN: Denies skin rashes or itching  ENDOCRINE: Denies excessive thirst  Denies intolerance to heat or cold  PSYCHOSOCIAL: Denies depression or anxiety  Denies any recent memory loss         Historical Information   Past Medical History:   Diagnosis Date    Asthma     Crohn disease ([facility] [address] )     DDD (degenerative disc disease), lumbosacral 2018    Gastroesophageal reflux disease without esophagitis 2018    Pneumonia     Sickle cell trait (HCC)     Urinary tract infection     Visual impairment      Past Surgical History:   Procedure Laterality Date     SECTION      CHOLECYSTECTOMY      LIPOSUCTION      SMALL INTESTINE SURGERY      TONSILLECTOMY       Social History   History   Alcohol Use    Yes     Comment: Occassionally     History   Drug Use No     History   Smoking Status    Never Smoker   Smokeless Tobacco    Never Used     Family History   Problem Relation Age of Onset    Hepatitis Mother     Thyroid disease Mother     Diabetes Father     Hypertension Father     Hyperlipidemia Father     Cataracts Father        Meds/Allergies       Current Outpatient Prescriptions:     Certolizumab Pegol 6 X 200 MG/ML KIT    colestipol (COLESTID) 1 g tablet    dicyclomine (BENTYL) 20 mg tablet    famotidine (PEPCID) 40 MG tablet    HYDROcodone-acetaminophen (NORCO) 5-325 mg per tablet    hyoscyamine (LEVSIN/SL) 0 125 mg SL tablet    pantoprazole (PROTONIX) 40 mg tablet    phenazopyridine (PYRIDIUM) 200 mg tablet    diphenoxylate-atropine (LOMOTIL) 2 5-0 025 mg per tablet    Na Sulfate-K Sulfate-Mg Sulf (SUPREP BOWEL PREP KIT) 17 5-3 13-1 6 GM/180ML SOLN    Allergies   Allergen Reactions    Aspirin Other (See Comments)     "I was a baby, I have no idea"           Objective     Blood pressure 114/74, pulse 77, temperature 98 6 °F (37 °C), temperature source Tympanic, height 5' 5" (1 651 m), weight 83 9 kg (185 lb), not currently breastfeeding  Body mass index is 30 79 kg/m²  PHYSICAL EXAM:      General Appearance:   Alert, cooperative, no distress   HEENT:   Normocephalic, atraumatic, anicteric      Neck:  Supple, symmetrical, trachea midline   Lungs:   Clear to auscultation bilaterally; no rales, rhonchi or wheezing; respirations unlabored    Heart[de-identified]   Regular rate and rhythm; no murmur, rub, or gallop  Abdomen:   Soft, generalized tenderness, non-distended; normal bowel sounds; no masses, no organomegaly    Genitalia:   Deferred    Rectal:   Deferred    Extremities:  No cyanosis, clubbing or edema    Pulses:  2+ and symmetric    Skin:  No jaundice, rashes, or lesions    Lymph nodes:  No palpable cervical lymphadenopathy        Lab Results:     Lab Results   Component Value Date    WBC 7 17 04/11/2018    HGB 12 3 04/11/2018    HCT 36 1 04/11/2018    MCV 87 04/11/2018     04/11/2018       Lab Results   Component Value Date     04/11/2018    K 3 8 04/11/2018     04/11/2018    CO2 28 04/11/2018    BUN 14 04/11/2018    CREATININE 0 74 04/11/2018    GLUF 90 04/11/2018    CALCIUM 8 1 04/11/2018    AST 17 04/11/2018    ALT 33 04/11/2018    ALKPHOS 95 04/11/2018    EGFR 104 04/11/2018       No results found for: INR, PROTIME      Radiology Results:   No results found

## 2018-10-10 ENCOUNTER — TELEPHONE (OUTPATIENT)
Dept: GASTROENTEROLOGY | Facility: AMBULARY SURGERY CENTER | Age: 38
End: 2018-10-10

## 2018-10-11 ENCOUNTER — DOCUMENTATION (OUTPATIENT)
Dept: GASTROENTEROLOGY | Facility: MEDICAL CENTER | Age: 38
End: 2018-10-11

## 2018-10-11 NOTE — TELEPHONE ENCOUNTER
I rescheduled pt to 12/7/18 at [address] with [address] Abdullahi Ramey, pt is aware she will get a call the day before with exact time of arrival

## 2018-10-19 ENCOUNTER — TELEPHONE (OUTPATIENT)
Dept: GASTROENTEROLOGY | Facility: CLINIC | Age: 38
End: 2018-10-19

## 2018-10-19 DIAGNOSIS — K50.019 CROHN'S DISEASE OF SMALL INTESTINE WITH COMPLICATION (HCC): ICD-10-CM

## 2018-10-19 DIAGNOSIS — K50.011 CROHN'S DISEASE OF SMALL INTESTINE WITH RECTAL BLEEDING (HCC): Primary | ICD-10-CM

## 2018-10-19 NOTE — TELEPHONE ENCOUNTER
Can someone please place the medication in the patients chart and print it so I can fax the script to [facility]? Thank you!

## 2018-10-19 NOTE — TELEPHONE ENCOUNTER
misha patient      She called to notify the provider that the cimzia needs to be ordered through a specilaty pharmacy    Call back # [phone]

## 2018-10-22 PROBLEM — E66.3 OVERWEIGHT: Status: ACTIVE | Noted: 2018-10-22

## 2018-10-22 PROBLEM — R63.5 ABNORMAL WEIGHT GAIN: Status: ACTIVE | Noted: 2018-10-22

## 2018-10-25 NOTE — TELEPHONE ENCOUNTER
Pt called stating she didn't received the cimzia  Pt also need advise for abd pain which range at a 10 of pain level and bloody diarrhea also

## 2018-10-25 NOTE — TELEPHONE ENCOUNTER
Patient with history of Crohn's  Patient called to report lower abdomen/left flank/back pain 10/10 for 3 days  Bloody diarrhea 10 per day for 3 days -waking her up to go to bathroom  Decreased appetite/fluid intake  Does not take prescribed Bentyl or Protonix because it makes her sleepy  Taking Pantoprazole but does not help her GERD symptoms  Advised patient to go to ER  She will follow up with us if she goes to out of network ER

## 2018-10-25 NOTE — TELEPHONE ENCOUNTER
Please let me know when she calls back, if she is having abdominal pain that severe she may need to go to the ED

## 2018-10-25 NOTE — TELEPHONE ENCOUNTER
Called [facility], spoke with Lima Servin who then transferred me to the specialty pharmacy ( [phone]) the patient needs to call them to set up enrollment  I called the patient and had to leave a voicemail

## 2018-10-26 NOTE — TELEPHONE ENCOUNTER
Left message on voice mail for patient to call back and update us on her condition and if she went to ER as advised yesterday and to provide her with information about Ann Turcios perscription she is waiting for

## 2018-10-29 NOTE — TELEPHONE ENCOUNTER
Called the patient to follow up from symptom call 10/25  Patient states she is doing much better since receiving Cimzea on 10/26  Her abdominal pain has decreased significantly and she is only having 4 stools a day/no blood and she has been able to discontinue the Imodium  She is pleased with her progress and thanked me for calling  She will contact office to schedule follow up visit with Dr Parish Inman

## 2018-12-06 ENCOUNTER — TELEPHONE (OUTPATIENT)
Dept: GASTROENTEROLOGY | Facility: CLINIC | Age: 38
End: 2018-12-06

## 2018-12-06 ENCOUNTER — ANESTHESIA EVENT (OUTPATIENT)
Dept: GASTROENTEROLOGY | Facility: MEDICAL CENTER | Age: 38
End: 2018-12-06
Payer: COMMERCIAL

## 2018-12-06 NOTE — TELEPHONE ENCOUNTER
Dr Jeni Harris pt    Pt is requesting for her prep instructions to be faxed to [phone]  She is currently waiting for the fax   Pt is scheduled tomorrow

## 2018-12-07 ENCOUNTER — HOSPITAL ENCOUNTER (OUTPATIENT)
Facility: MEDICAL CENTER | Age: 38
Setting detail: OUTPATIENT SURGERY
Discharge: HOME | End: 2018-12-07
Attending: INTERNAL MEDICINE | Admitting: INTERNAL MEDICINE
Payer: COMMERCIAL

## 2018-12-07 ENCOUNTER — ANESTHESIA (OUTPATIENT)
Dept: GASTROENTEROLOGY | Facility: MEDICAL CENTER | Age: 38
End: 2018-12-07
Payer: COMMERCIAL

## 2018-12-07 VITALS
RESPIRATION RATE: 16 BRPM | HEIGHT: 65 IN | BODY MASS INDEX: 30.82 KG/M2 | OXYGEN SATURATION: 99 % | TEMPERATURE: 98 F | HEART RATE: 72 BPM | SYSTOLIC BLOOD PRESSURE: 117 MMHG | WEIGHT: 185 LBS | DIASTOLIC BLOOD PRESSURE: 68 MMHG

## 2018-12-07 DIAGNOSIS — K58.0 IRRITABLE BOWEL SYNDROME WITH DIARRHEA: Primary | ICD-10-CM

## 2018-12-07 DIAGNOSIS — K50.011 CROHN'S DISEASE OF SMALL INTESTINE WITH RECTAL BLEEDING (HCC): ICD-10-CM

## 2018-12-07 DIAGNOSIS — R10.84 GENERALIZED ABDOMINAL PAIN: ICD-10-CM

## 2018-12-07 PROCEDURE — 88305 TISSUE EXAM BY PATHOLOGIST: CPT | Performed by: PATHOLOGY

## 2018-12-07 PROCEDURE — 43239 EGD BIOPSY SINGLE/MULTIPLE: CPT | Performed by: INTERNAL MEDICINE

## 2018-12-07 PROCEDURE — 88342 IMHCHEM/IMCYTCHM 1ST ANTB: CPT | Performed by: PATHOLOGY

## 2018-12-07 PROCEDURE — 45380 COLONOSCOPY AND BIOPSY: CPT | Performed by: INTERNAL MEDICINE

## 2018-12-07 RX ORDER — PROPOFOL 10 MG/ML
INJECTION, EMULSION INTRAVENOUS AS NEEDED
Status: DISCONTINUED | OUTPATIENT
Start: 2018-12-07 | End: 2018-12-07 | Stop reason: SURG

## 2018-12-07 RX ORDER — SODIUM CHLORIDE 9 MG/ML
125 INJECTION, SOLUTION INTRAVENOUS CONTINUOUS
Status: DISCONTINUED | OUTPATIENT
Start: 2018-12-07 | End: 2018-12-07 | Stop reason: HOSPADM

## 2018-12-07 RX ORDER — DICYCLOMINE HCL 20 MG
20 TABLET ORAL EVERY 8 HOURS
Qty: 90 TABLET | Refills: 3 | Status: SHIPPED | OUTPATIENT
Start: 2018-12-07 | End: 2019-05-02 | Stop reason: SDUPTHER

## 2018-12-07 RX ADMIN — PROPOFOL 100 MG: 10 INJECTION, EMULSION INTRAVENOUS at 10:15

## 2018-12-07 RX ADMIN — PROPOFOL 30 MG: 10 INJECTION, EMULSION INTRAVENOUS at 10:27

## 2018-12-07 RX ADMIN — PROPOFOL 50 MG: 10 INJECTION, EMULSION INTRAVENOUS at 10:24

## 2018-12-07 RX ADMIN — PROPOFOL 30 MG: 10 INJECTION, EMULSION INTRAVENOUS at 10:31

## 2018-12-07 RX ADMIN — PROPOFOL 30 MG: 10 INJECTION, EMULSION INTRAVENOUS at 10:19

## 2018-12-07 RX ADMIN — LIDOCAINE HYDROCHLORIDE 40 MG: 20 INJECTION, SOLUTION INTRAVENOUS at 10:15

## 2018-12-07 RX ADMIN — PROPOFOL 50 MG: 10 INJECTION, EMULSION INTRAVENOUS at 10:17

## 2018-12-07 RX ADMIN — PROPOFOL 30 MG: 10 INJECTION, EMULSION INTRAVENOUS at 10:38

## 2018-12-07 RX ADMIN — PROPOFOL 30 MG: 10 INJECTION, EMULSION INTRAVENOUS at 10:35

## 2018-12-07 RX ADMIN — SODIUM CHLORIDE 125 ML/HR: 0.9 INJECTION, SOLUTION INTRAVENOUS at 09:46

## 2018-12-07 RX ADMIN — PROPOFOL 20 MG: 10 INJECTION, EMULSION INTRAVENOUS at 10:21

## 2018-12-07 NOTE — DISCHARGE INSTRUCTIONS
Upper Endoscopy   WHAT YOU NEED TO KNOW:   An upper endoscopy is also called an upper gastrointestinal (GI) endoscopy, or an esophagogastroduodenoscopy (EGD)  You may feel bloated, gassy, or have some abdominal discomfort after your procedure  Your throat may be sore for 24 to 36 hours  You may burp or pass gas from air that is still inside your body  DISCHARGE INSTRUCTIONS:   Call 911 for any of the following:   · You have sudden chest pain or trouble breathing  Seek care immediately if:   · You feel dizzy or faint  · You have trouble swallowing  · Your bowel movements are very dark or black  · Your abdomen is hard and firm and you have severe pain  · You vomit blood  Contact your healthcare provider if:   · You feel full or bloated and cannot burp or pass gas  · You have not had a bowel movement for 3 days after your procedure  · You have neck pain  · You have a fever or chills  · You have nausea or are vomiting  · You have a rash or hives  · You have questions or concerns about your endoscopy  Relieve a sore throat:  Suck on throat lozenges or crushed ice  Gargle with a small amount of warm salt water  Mix 1 teaspoon of salt and 1 cup of warm water to make salt water  Relieve gas and discomfort from bloating:  Lie on your right side with a heating pad on your abdomen  Take short walks to help pass gas  Eat small meals until bloating is relieved  Rest after your procedure: You have been given medicine to relax you  Do not  drive or make important decisions until the day after your procedure  Return to your normal activity as directed  You can usually return to work the day after your procedure  Follow up with your healthcare provider as directed:  Write down your questions so you remember to ask them during your visits     © 2017 [address] is for End User's use only and may not be sold, redistributed or otherwise used for commercial purposes  All illustrations and images included in CareNotes® are the copyrighted property of [employer]  or Jaciel Smith  The above information is an  only  It is not intended as medical advice for individual conditions or treatments  Talk to your doctor, nurse or pharmacist before following any medical regimen to see if it is safe and effective for you  Gastritis   WHAT YOU NEED TO KNOW:   Gastritis is inflammation or irritation of the lining of your stomach  DISCHARGE INSTRUCTIONS:   Call 911 for any of the following:   · You develop chest pain or shortness of breath  Seek care immediately if:   · You vomit blood  · You have black or bloody bowel movements  · You have severe stomach or back pain  Contact your healthcare provider if:   · You have a fever  · You have new or worsening symptoms, even after treatment  · You have questions or concerns about your condition or care  Medicines:   · Medicines  may be given to help treat a bacterial infection or decrease stomach acid  · Take your medicine as directed  Contact your healthcare provider if you think your medicine is not helping or if you have side effects  Tell him or her if you are allergic to any medicine  Keep a list of the medicines, vitamins, and herbs you take  Include the amounts, and when and why you take them  Bring the list or the pill bottles to follow-up visits  Carry your medicine list with you in case of an emergency  Manage or prevent gastritis:   · Do not smoke  Nicotine and other chemicals in cigarettes and cigars can make your symptoms worse and cause lung damage  Ask your healthcare provider for information if you currently smoke and need help to quit  E-cigarettes or smokeless tobacco still contain nicotine  Talk to your healthcare provider before you use these products  · Do not drink alcohol  Alcohol can prevent healing and make your gastritis worse   Talk to your healthcare provider if you need help to stop drinking  · Do not take NSAIDs or aspirin unless directed  These and similar medicines can cause irritation  If your healthcare provider says it is okay to take NSAIDs, take them with food  · Do not eat foods that cause irritation  Foods such as oranges and salsa can cause burning or pain  Eat a variety of healthy foods  Examples include fruits (not citrus), vegetables, low-fat dairy products, beans, whole-grain breads, and lean meats and fish  Try to eat small meals, and drink water with your meals  Do not eat for at least 3 hours before you go to bed  · Find ways to relax and decrease stress  Stress can increase stomach acid and make gastritis worse  Activities such as yoga, meditation, or listening to music can help you relax  Spend time with friends, or do things you enjoy  Follow up with your healthcare provider as directed: You may need ongoing tests or treatment, or referral to a gastroenterologist  Write down your questions so you remember to ask them during your visits  © 2017 [address] [facility] Information is for End User's use only and may not be sold, redistributed or otherwise used for commercial purposes  All illustrations and images included in CareNotes® are the copyrighted property of A D A M , Inc  or Jaciel Smith  The above information is an  only  It is not intended as medical advice for individual conditions or treatments  Talk to your doctor, nurse or pharmacist before following any medical regimen to see if it is safe and effective for you  Diet for Stomach Ulcers and Gastritis   AMBULATORY CARE:   A diet for stomach ulcers and gastritis  is a meal plan that limits foods that irritate your stomach  Certain foods may worsen symptoms such as stomach pain, bloating, heartburn, or indigestion  Foods to limit or avoid:  You may need to avoid acidic, spicy, or high-fat foods   Not all foods affect everyone the same way  You will need to learn which foods worsen your symptoms and limit those foods  The following are some foods that may worsen ulcer or gastritis symptoms:  · Beverages:      ¨ Whole milk and chocolate milk    ¨ Hot cocoa and cola    ¨ Any beverage with caffeine    ¨ Regular and decaffeinated coffee    ¨ Peppermint and spearmint tea    ¨ Green and black tea, with or without caffeine    ¨ Orange and grapefruit juices    ¨ Drinks that contain alcohol    · Spices and seasonings:      ¨ Black and red pepper    ¨ Chili powder    ¨ Mustard seed and nutmeg    · Other foods:      ¨ Dairy foods made from whole milk or cream    ¨ Chocolate    ¨ Spicy or strongly flavored cheeses, such as jalapeno or black pepper    ¨ Highly seasoned, high-fat meats, such as sausage, salami, hu, ham, and cold cuts    ¨ Hot chiles and peppers    ¨ Tomato products, such as tomato paste, tomato sauce, or tomato juice  Foods to include:  Eat a variety of healthy foods from all the food groups  Eat fruits, vegetables, whole grains, and fat-free or low-fat dairy foods  Whole grains include whole-wheat breads, cereals, pasta, and brown rice  Choose lean meats, poultry (chicken and turkey), fish, beans, eggs, and nuts  A healthy meal plan is low in unhealthy fats, salt, and added sugar  Healthy fats include olive oil and canola oil  Ask your dietitian for more information about a healthy diet  Other helpful guidelines:   · Do not eat right before bedtime  Stop eating at least 2 hours before bedtime  · Eat small, frequent meals  Your stomach may tolerate small, frequent meals better than large meals  © 2017 [address] [facility] Information is for End User's use only and may not be sold, redistributed or otherwise used for commercial purposes  All illustrations and images included in CareNotes® are the copyrighted property of A D A M , Inc  or Jaciel Smith    The above information is an educational aid only  It is not intended as medical advice for individual conditions or treatments  Talk to your doctor, nurse or pharmacist before following any medical regimen to see if it is safe and effective for you  Colonoscopy   WHAT YOU NEED TO KNOW:   A colonoscopy is a procedure to examine the inside of your colon (intestine) with a scope  Polyps or tissue growths may have been removed during your colonoscopy  It is normal to feel bloated and to have some abdominal discomfort  You should be passing gas  If you have hemorrhoids or you had polyps removed, you may have a small amount of bleeding  DISCHARGE INSTRUCTIONS:   Seek care immediately if:   · You have a large amount of bright red blood in your bowel movements  · Your abdomen is hard and firm and you have severe pain  · You have sudden trouble breathing  Contact your healthcare provider if:   · You develop a rash or hives  · You have a fever within 24 hours of your procedure       · You have not had a bowel movement for 3 days after your procedure  · You have questions or concerns about your condition or care  Activity:   · Do not lift, strain, or run  for 3 days after your procedure  · Rest after your procedure  You have been given medicine to relax you  Do not  drive or make important decisions until the day after your procedure  Return to your normal activity as directed  · Relieve gas and discomfort from bloating  by lying on your right side with a heating pad on your abdomen  You may need to take short walks to help the gas move out  Eat small meals until bloating is relieved  If you had polyps removed: For 7 days after your procedure:  · Do not  take aspirin  · Do not  go on long car rides  Follow up with your healthcare provider as directed:  Write down your questions so you remember to ask them during your visits     © 2017 [address] is for End User's use only and may not be sold, redistributed or otherwise used for commercial purposes  All illustrations and images included in CareNotes® are the copyrighted property of A D A M , Inc  or Jaciel Smith  The above information is an  only  It is not intended as medical advice for individual conditions or treatments  Talk to your doctor, nurse or pharmacist before following any medical regimen to see if it is safe and effective for you  Colonoscopy   WHAT YOU NEED TO KNOW:   A colonoscopy is a procedure to examine the inside of your colon (intestine) with a scope  Polyps or tissue growths may have been removed during your colonoscopy  It is normal to feel bloated and to have some abdominal discomfort  You should be passing gas  If you have hemorrhoids or you had polyps removed, you may have a small amount of bleeding  DISCHARGE INSTRUCTIONS:   Seek care immediately if:   · You have a large amount of bright red blood in your bowel movements  · Your abdomen is hard and firm and you have severe pain  · You have sudden trouble breathing  Contact your healthcare provider if:   · You develop a rash or hives  · You have a fever within 24 hours of your procedure       · You have not had a bowel movement for 3 days after your procedure  · You have questions or concerns about your condition or care  Activity:   · Do not lift, strain, or run  for 3 days after your procedure  · Rest after your procedure  You have been given medicine to relax you  Do not  drive or make important decisions until the day after your procedure  Return to your normal activity as directed  · Relieve gas and discomfort from bloating  by lying on your right side with a heating pad on your abdomen  You may need to take short walks to help the gas move out  Eat small meals until bloating is relieved  If you had polyps removed: For 7 days after your procedure:  · Do not  take aspirin  · Do not  go on long car rides    Follow up with your healthcare provider as directed:  Write down your questions so you remember to ask them during your visits  © 2017 [address] is for End User's use only and may not be sold, redistributed or otherwise used for commercial purposes  All illustrations and images included in CareNotes® are the copyrighted property of A D A M , Inc  or Jaciel Smith  The above information is an  only  It is not intended as medical advice for individual conditions or treatments  Talk to your doctor, nurse or pharmacist before following any medical regimen to see if it is safe and effective for you

## 2018-12-07 NOTE — OP NOTE
OPERATIVE REPORT  PATIENT NAME: Cydney Esteves    :  1980  MRN: [id number]  Pt Location: [facility] GI ROOM 01    SURGERY DATE: 2018    Surgeon(s) and Role:     * Jessy Mendoza MD - Primary    Preop Diagnosis:  Crohn's disease of small intestine with rectal bleeding ([facility] [address] ) [K50 011]  Generalized abdominal pain [R10 84]    Post-Op Diagnosis Codes:     * Crohn's disease of small intestine with rectal bleeding ([facility] [address] ) [K50 011]     * Generalized abdominal pain [R10 84]    Procedure(s) (LRB):  EGD AND COLONOSCOPY (N/A)    Specimen(s):  ID Type Source Tests Collected by Time Destination   1 : cold bx's forcep hx crohns Tissue Duodenum TISSUE EXAM Jessy Mendoza MD 2018 1018    2 : gastric bx's r/o H pylori Tissue Stomach TISSUE EXAM Jessy Mendoza MD 2018 1019        Estimated Blood Loss:   Minimal    ESOPHAGOGASTRODUODENOSCOPY    PROCEDURE: EGD    SEDATION: Monitored anesthesia care, check anesthesia records    ASA Class: 2    INDICATIONS: abdominal pain, history of Crohn's of small intestine    CONSENT:  Informed consent was obtained for the procedure, including sedation after explaining the risks and benefits of the procedure  Risks including but not limited to bleeding, perforation, infection, and missed lesion  PREPARATION:   Telemetry, pulse oximetry, blood pressure were monitored throughout the procedure  Patient was identified by myself both verbally and by visual inspection of ID band  DESCRIPTION:   Patient was placed in the left lateral decubitus position and was sedated with the above medication  The gastroscope was introduced in to the oropharynx and the esophagus was intubated under direct visualization  Scope was passed down the esophagus up to 2nd part of the duodenum  A careful inspection was made as the gastroscope was withdrawn, including a retroflexed view of the stomach; findings and interventions are described below       FINDINGS:    #1  Esophagus- normal appearing esophagus  GE junction located at 33 cm from the incisors  #2  Stomach- mild antral gastritis, cold forcep biopsies taken to rule out H pylori  Normal GE junction retroflexed views  #3  Duodenum- slightly flattened villi located in the 2nd portion of the duodenum, cold forcep biopsies taken in the 2nd portion and the duodenal bulb to rule out celiac disease and intestinal Crohn's disease  IMPRESSIONS:      Normal esophagus  Mild antral gastritis  Slightly flattened villi in the 2nd portion of duodenum  Normal GE junction  RECOMMENDATIONS:     Follow-up biopsies  Proceed to colonoscopy as previously scheduled  COMPLICATIONS:  None; patient tolerated the procedure well            DISPOSITION: PACU           CONDITION: Stable      SIGNATURE: Cira Dick MD  DATE: December 7, 2018  TIME: 10:21 AM

## 2018-12-07 NOTE — H&P
History and Physical -  [facility]  Zeny Jeong 45 y o  female MRN: [id number]                  HPI: Zeny Jeong is a 45y o  year old female who presents for colonoscopy  REVIEW OF SYSTEMS: Per the HPI, and otherwise unremarkable  Historical Information   Past Medical History:   Diagnosis Date    Crohn disease ([address] )     Crohn's disease of small intestine with rectal bleeding (HCC)     DDD (degenerative disc disease), lumbosacral 2018    Gastroesophageal reflux disease without esophagitis 2018    Pneumonia     Sickle cell trait (HCC)     Urinary tract infection     Visual impairment      Past Surgical History:   Procedure Laterality Date     SECTION      CHOLECYSTECTOMY      LIPOSUCTION      SMALL INTESTINE SURGERY      TONSILLECTOMY       Social History   History   Alcohol Use    Yes     Comment: Occassionally     History   Drug Use No     History   Smoking Status    Never Smoker   Smokeless Tobacco    Never Used     Family History   Problem Relation Age of Onset    Hepatitis Mother     Thyroid disease Mother     Diabetes Father     Hypertension Father     Hyperlipidemia Father     Cataracts Father        Meds/Allergies     Prescriptions Prior to Admission   Medication    Certolizumab Pegol 6 X 200 MG/ML KIT    dicyclomine (BENTYL) 20 mg tablet    diphenoxylate-atropine (LOMOTIL) 2 5-0 025 mg per tablet    famotidine (PEPCID) 40 MG tablet    HYDROcodone-acetaminophen (NORCO) 5-325 mg per tablet    hyoscyamine (LEVSIN/SL) 0 125 mg SL tablet    Na Sulfate-K Sulfate-Mg Sulf (SUPREP BOWEL PREP KIT) 17 5-3 13-1 6 GM/180ML SOLN       Allergies   Allergen Reactions    Aspirin Other (See Comments)     "I was a baby, I have no idea"       Objective     Blood pressure 147/73, pulse 75, temperature 98 °F (36 7 °C), temperature source Temporal, resp   rate 16, height 5' 5" (1 651 m), weight 83 9 kg (185 lb), SpO2 96 %, not currently breastfeeding  PHYSICAL EXAM    Gen: NAD  CV: RRR  CHEST: Clear  ABD: soft, NT/ND  EXT: no edema      ASSESSMENT/PLAN:  This is a 45y o  year old female here for colonoscopy, and she is stable and optimized for her procedure

## 2018-12-07 NOTE — OP NOTE
OPERATIVE REPORT  PATIENT NAME: Lauren Quick    :  1980  MRN: [id number]  Pt Location: [facility] GI ROOM 01    SURGERY DATE: 2018    Surgeon(s) and Role:     * Tia Browning MD - Primary    Preop Diagnosis:  Crohn's disease of small intestine with rectal bleeding ([facility] [address] ) [K50 011]  Generalized abdominal pain [R10 84]    Post-Op Diagnosis Codes:     * Crohn's disease of small intestine with rectal bleeding ([address] ) [K50 011]     * Generalized abdominal pain [R10 84]    Procedure(s) (LRB):  EGD AND COLONOSCOPY (N/A)    Specimen(s):  ID Type Source Tests Collected by Time Destination   3 : cold bx's forcep crohns disease Tissue Terminal Ileum TISSUE EXAM Tia Browning MD 2018 1036    4 : cold bx forcep hx crohns Tissue Large Intestine, Cecum TISSUE EXAM Tia Browning MD 2018 1036    5 : random colon bx's hx crohns  Tissue Colon TISSUE EXAM Tia Browning MD 2018 1037        Estimated Blood Loss:   Minimal    COLONOSCOPY    PROCEDURE: Colonoscopy/ Biopsy    INDICATIONS: Crohn's Disease, Diarrhea, Bloody, Diarrhea, Chronic    POST-OP DIAGNOSIS: See the impression below    SEDATION: Monitored anesthesia care, check anesthesia records    PHYSICAL EXAM:    /73   Pulse 75   Temp 98 °F (36 7 °C) (Temporal)   Resp 16   Ht 5' 5" (1 651 m)   Wt 83 9 kg (185 lb)   SpO2 96%   BMI 30 79 kg/m²   Body mass index is 30 79 kg/m²  General: NAD  Heart: S1 & S2 normal, RRR  Lungs: CTA, No rales or rhonchi  Abdomen: Soft, nontender, nondistended, good bowel sounds    CONSENT:  Informed consent was obtained for the procedure, including sedation after explaining the risks and benefits of the procedure  Risks including but not limited to bleeding, perforation, infection, aspiration were discussed in detail  Also explained about less than 100%$ sensitivity with the exam and other alternatives       PREPARATION:   EKG tracing, pulse oximetry, blood pressure were monitored throughout the Problem: Bowel/Gastric:  Goal: Normal bowel function is maintained or improved  Intervention: Collaborate with Interdisciplinary Team for optimal positioning for bowel evacuation  Working with all staff to turn pt and make him comfortable if BM needed to occur    Goal: Will not experience complications related to bowel motility  Intervention: Implement interventions to promote bowel evacuation if inadequate bowel movements in past 48 hours  Bowel med protocol in place and pt is accepting medications.           procedure  Patient was identified by myself both verbally and by visual inspection of ID band  DESCRIPTION:   Patient was placed in the left lateral decubitus position and was sedated with the above medication  Digital rectal examination was performed  The colonoscope was introduced in to the anal canal and advanced up to terminal ileum  A careful inspection was made as the colonoscope was withdrawn, including a retroflexed view of the rectum; findings and interventions are described below  Appropriate photodocumentation was obtained  The quality of the colonic preparation was good  FINDINGS:    Erosive disease of the terminal ileum, cold forcep biopsies taken  Evidence of erosions seen in the cecum, with burnt-out colitis, cold biopsies taken  The remainder of the colon appeared normal, cold forcep biopsies were taken throughout to rule out microscopic disease  Normal rectal retroflexed views  IMPRESSIONS:      Erosive disease of the terminal ileum consistent with patient's history of small intestinal Crohn's disease  Evidence of erosions and burnt-out colitis in the cecum  Remainder of the colon was normal   Normal rectal retroflexion  RECOMMENDATIONS:    Follow-up biopsies  Continue Cimzia  Resume regular diet as tolerated  Discharge home  COMPLICATIONS:  None; patient tolerated the procedure well      DISPOSITION: PACU           CONDITION: Stable            SIGNATURE: Oneida Mccurdy MD  DATE: December 7, 2018  TIME: 10:44 AM

## 2018-12-07 NOTE — DISCHARGE INSTR - AVS FIRST PAGE
OPERATIVE REPORT  PATIENT NAME: Tonya Bird    :  1980  MRN: [id number]  Pt Location: [facility] GI ROOM 01    SURGERY DATE: 2018    Surgeon(s) and Role:     * Nallely Dickerson MD - Primary    Preop Diagnosis:  Crohn's disease of small intestine with rectal bleeding ([facility] [address] ) [K50 011]  Generalized abdominal pain [R10 84]    Post-Op Diagnosis Codes:     * Crohn's disease of small intestine with rectal bleeding ([facility] [address] ) [K50 011]     * Generalized abdominal pain [R10 84]    Procedure(s) (LRB):  EGD AND COLONOSCOPY (N/A)    Specimen(s):  ID Type Source Tests Collected by Time Destination   1 : cold bx's forcep hx crohns Tissue Duodenum TISSUE EXAM Nallely Dickerson MD 2018 1018    2 : gastric bx's r/o H pylori Tissue Stomach TISSUE EXAM Nallely Dickerson MD 2018 1019        Estimated Blood Loss:   Minimal    ESOPHAGOGASTRODUODENOSCOPY    PROCEDURE: EGD    SEDATION: Monitored anesthesia care, check anesthesia records    ASA Class: 2    INDICATIONS: abdominal pain, history of Crohn's of small intestine    CONSENT:  Informed consent was obtained for the procedure, including sedation after explaining the risks and benefits of the procedure  Risks including but not limited to bleeding, perforation, infection, and missed lesion  PREPARATION:   Telemetry, pulse oximetry, blood pressure were monitored throughout the procedure  Patient was identified by myself both verbally and by visual inspection of ID band  DESCRIPTION:   Patient was placed in the left lateral decubitus position and was sedated with the above medication  The gastroscope was introduced in to the oropharynx and the esophagus was intubated under direct visualization  Scope was passed down the esophagus up to 2nd part of the duodenum  A careful inspection was made as the gastroscope was withdrawn, including a retroflexed view of the stomach; findings and interventions are described below       FINDINGS:    #1  Esophagus- normal appearing esophagus  GE junction located at 33 cm from the incisors  #2  Stomach- mild antral gastritis, cold forcep biopsies taken to rule out H pylori  Normal GE junction retroflexed views  #3  Duodenum- slightly flattened villi located in the 2nd portion of the duodenum, cold forcep biopsies taken in the 2nd portion and the duodenal bulb to rule out celiac disease and intestinal Crohn's disease  IMPRESSIONS:      Normal esophagus  Mild antral gastritis  Slightly flattened villi in the 2nd portion of duodenum  Normal GE junction  RECOMMENDATIONS:     Follow-up biopsies  Proceed to colonoscopy as previously scheduled  COMPLICATIONS:  None; patient tolerated the procedure well            DISPOSITION: PACU           CONDITION: Stable      SIGNATURE: Pearl Duverney, MD  DATE: 2018  TIME: 10:21 AM      OPERATIVE REPORT  PATIENT NAME: Birgit Pineda    :  1980  MRN: [id number]  Pt Location: [facility] GI ROOM 01    SURGERY DATE: 2018    Surgeon(s) and Role:     * Pearl Duverney, MD - Primary    Preop Diagnosis:  Crohn's disease of small intestine with rectal bleeding ([facility] [address] ) [K50 011]  Generalized abdominal pain [R10 84]    Post-Op Diagnosis Codes:     * Crohn's disease of small intestine with rectal bleeding ([address] ) [K50 011]     * Generalized abdominal pain [R10 84]    Procedure(s) (LRB):  EGD AND COLONOSCOPY (N/A)    Specimen(s):  ID Type Source Tests Collected by Time Destination   3 : cold bx's forcep crohns disease Tissue Terminal Ileum TISSUE EXAM Pearl Duverney, MD 2018 1036    4 : cold bx forcep hx crohns Tissue Large Intestine, Cecum TISSUE EXAM Pearl Duverney, MD 2018 1036    5 : random colon bx's hx crohns  Tissue Colon TISSUE EXAM Pearl Duverney, MD 2018 1037        Estimated Blood Loss:   Minimal    COLONOSCOPY    PROCEDURE: Colonoscopy/ Biopsy    INDICATIONS: Crohn's Disease, Diarrhea, Bloody, Diarrhea, Chronic    POST-OP DIAGNOSIS: See the impression below    SEDATION: Monitored anesthesia care, check anesthesia records    PHYSICAL EXAM:    /73   Pulse 75   Temp 98 °F (36 7 °C) (Temporal)   Resp 16   Ht 5' 5" (1 651 m)   Wt 83 9 kg (185 lb)   SpO2 96%   BMI 30 79 kg/m²    Body mass index is 30 79 kg/m²  General: NAD  Heart: S1 & S2 normal, RRR  Lungs: CTA, No rales or rhonchi  Abdomen: Soft, nontender, nondistended, good bowel sounds    CONSENT:  Informed consent was obtained for the procedure, including sedation after explaining the risks and benefits of the procedure  Risks including but not limited to bleeding, perforation, infection, aspiration were discussed in detail  Also explained about less than 100%$ sensitivity with the exam and other alternatives  PREPARATION:   EKG tracing, pulse oximetry, blood pressure were monitored throughout the procedure  Patient was identified by myself both verbally and by visual inspection of ID band  DESCRIPTION:   Patient was placed in the left lateral decubitus position and was sedated with the above medication  Digital rectal examination was performed  The colonoscope was introduced in to the anal canal and advanced up to terminal ileum  A careful inspection was made as the colonoscope was withdrawn, including a retroflexed view of the rectum; findings and interventions are described below  Appropriate photodocumentation was obtained  The quality of the colonic preparation was good  FINDINGS:    Erosive disease of the terminal ileum, cold forcep biopsies taken  Evidence of erosions seen in the cecum, with burnt-out colitis, cold biopsies taken  The remainder of the colon appeared normal, cold forcep biopsies were taken throughout to rule out microscopic disease  Normal rectal retroflexed views  IMPRESSIONS:      Erosive disease of the terminal ileum consistent with patient's history of small intestinal Crohn's disease    Evidence of erosions and burnt-out colitis in the cecum  Remainder of the colon was normal   Normal rectal retroflexion  RECOMMENDATIONS:    Follow-up biopsies  Continue Cimzia  Resume regular diet as tolerated  Discharge home  COMPLICATIONS:  None; patient tolerated the procedure well      DISPOSITION: PACU           CONDITION: Stable            SIGNATURE: Stephanie Lennon MD  DATE: December 7, 2018  TIME: 10:44 AM

## 2018-12-07 NOTE — ANESTHESIA PREPROCEDURE EVALUATION
Review of Systems/Medical History  Patient summary reviewed  Chart reviewed  No history of anesthetic complications     Cardiovascular  Negative cardio ROS    Pulmonary  Negative pulmonary ROS        GI/Hepatic    GERD , Bowel prep       Negative  ROS        Endo/Other  Negative endo/other ROS      GYN       Hematology  Negative hematology ROS      Musculoskeletal    Arthritis     Neurology  Negative neurology ROS      Psychology   Negative psychology ROS              Physical Exam    Airway    Mallampati score: II  TM Distance: >3 FB  Neck ROM: full     Dental   No notable dental hx     Cardiovascular  Comment: Negative ROS, Rhythm: regular, Rate: normal, Cardiovascular exam normal    Pulmonary  Pulmonary exam normal Breath sounds clear to auscultation,     Other Findings        Anesthesia Plan  ASA Score- 2     Anesthesia Type- IV sedation with anesthesia with ASA Monitors  Additional Monitors:   Airway Plan:         Plan Factors-    Induction- intravenous  Postoperative Plan-     Informed Consent- Anesthetic plan and risks discussed with patient

## 2018-12-11 ENCOUNTER — TELEPHONE (OUTPATIENT)
Dept: GASTROENTEROLOGY | Facility: CLINIC | Age: 38
End: 2018-12-11

## 2018-12-11 NOTE — TELEPHONE ENCOUNTER
Patient advised to take Bentyl every eight hours as prescribed for pain and to report to ER if symptoms continue or increase

## 2018-12-11 NOTE — TELEPHONE ENCOUNTER
Dr Shirley Hall pt    Pt called to advise since her egd/colon on 12/7, she has been experiencing constipation and stomach pains   Please call back to discuss [phone]

## 2018-12-11 NOTE — TELEPHONE ENCOUNTER
Her pains were likely from air from the procedure  Now that she is moving her bowels she should be fine  Bentyl as needed for pain is fine     Thanks Kathrine Bower

## 2018-12-11 NOTE — TELEPHONE ENCOUNTER
Dr Stoner Person patient -Hx Crohns/resection,   12/7/18 EGD/COLON  Patient stated that pain started after she had EGD/COLON on Friday  Right sided-lower abdominal pain of 7/10 increasing to 9/10 today  Abdomen soft, but pain when she presses on it  Rectal burning, itching and knot-like feeling  No fever or rectal bleeding  Nauseous/no vomiting  Reports constipated-last normal BM yesterday  Appetite stable/PO fluids adequate  She is taking Bentyl once a day  Recommended she take every 8 hours as prescribed  Discussed alarm symptoms and patient verbalized understanding to go to ER if symptoms are sustained or increase

## 2018-12-11 NOTE — TELEPHONE ENCOUNTER
Looks like Arabella Turner talked to patient  Does she need a call back? I agree with assessment/plan - likely air from procedure, if symptoms persist/do not improve with bentyl, should report to ER

## 2018-12-12 ENCOUNTER — TELEPHONE (OUTPATIENT)
Dept: GASTROENTEROLOGY | Facility: MEDICAL CENTER | Age: 38
End: 2018-12-12

## 2018-12-12 DIAGNOSIS — K50.019 CROHN'S DISEASE OF SMALL INTESTINE WITH COMPLICATION (HCC): ICD-10-CM

## 2018-12-12 NOTE — TELEPHONE ENCOUNTER
Dr Fouzia Perea pt    [facility] called stating they need a refill for pts Cimzia

## 2018-12-12 NOTE — TELEPHONE ENCOUNTER
[facility] stating pt cimzia was called into the incorrect pharmacy   Please forward to [facility] pharmacy [phone]

## 2018-12-14 DIAGNOSIS — K50.011 CROHN'S DISEASE OF SMALL INTESTINE WITH RECTAL BLEEDING (HCC): Primary | ICD-10-CM

## 2018-12-14 NOTE — TELEPHONE ENCOUNTER
Pharmacy called stating they need clarification on medication   Please call pharmacist at [phone]

## 2018-12-14 NOTE — TELEPHONE ENCOUNTER
Spoke with pharmacist  Luh Mendes was for initiation dose not maintenance  Verbal given & order changed in epic    Kandy Falk

## 2018-12-18 ENCOUNTER — OFFICE VISIT (OUTPATIENT)
Dept: GASTROENTEROLOGY | Facility: MEDICAL CENTER | Age: 38
End: 2018-12-18
Payer: COMMERCIAL

## 2018-12-18 ENCOUNTER — APPOINTMENT (OUTPATIENT)
Dept: LAB | Facility: MEDICAL CENTER | Age: 38
End: 2018-12-18
Payer: COMMERCIAL

## 2018-12-18 VITALS
DIASTOLIC BLOOD PRESSURE: 70 MMHG | BODY MASS INDEX: 30.49 KG/M2 | TEMPERATURE: 99.2 F | HEART RATE: 72 BPM | HEIGHT: 65 IN | WEIGHT: 183 LBS | SYSTOLIC BLOOD PRESSURE: 114 MMHG

## 2018-12-18 DIAGNOSIS — K50.011 CROHN'S DISEASE OF SMALL INTESTINE WITH RECTAL BLEEDING (HCC): ICD-10-CM

## 2018-12-18 DIAGNOSIS — K21.9 GASTROESOPHAGEAL REFLUX DISEASE WITHOUT ESOPHAGITIS: ICD-10-CM

## 2018-12-18 DIAGNOSIS — K58.2 IRRITABLE BOWEL SYNDROME WITH BOTH CONSTIPATION AND DIARRHEA: ICD-10-CM

## 2018-12-18 DIAGNOSIS — K58.2 IRRITABLE BOWEL SYNDROME WITH BOTH CONSTIPATION AND DIARRHEA: Primary | ICD-10-CM

## 2018-12-18 LAB — IGA SERPL-MCNC: 212 MG/DL (ref 70–400)

## 2018-12-18 PROCEDURE — 83516 IMMUNOASSAY NONANTIBODY: CPT

## 2018-12-18 PROCEDURE — 82784 ASSAY IGA/IGD/IGG/IGM EACH: CPT

## 2018-12-18 PROCEDURE — 36415 COLL VENOUS BLD VENIPUNCTURE: CPT

## 2018-12-18 PROCEDURE — 99214 OFFICE O/P EST MOD 30 MIN: CPT | Performed by: PHYSICIAN ASSISTANT

## 2018-12-18 NOTE — LETTER
December 18, 2018     Jacque Rodriguez DO  [address]    Patient: Katlin Powell   YOB: 1980   Date of Visit: 12/18/2018       Dear Dr Tip Stewart: Thank you for referring Katlin Powell to me for evaluation  Below are my notes for this consultation  If you have questions, please do not hesitate to call me  I look forward to following your patient along with you  Sincerely,        Kiki Morton PA-C        CC: No Recipients  Reji Palm  12/18/2018 11:38 AM  Sign at close encounter  Assessment/Plan:     Diagnoses and all orders for this visit:    Crohn's disease of small intestine with rectal bleeding ([address] )  She has a history of Crohn's disease with small bowel and colon involvement  She has tried multiple medications in the past and is currently on Cimzia  She states her stool frequency has decreased however she continues to have abdominal pain  Her biopsies did not show any active her chronic inflammation  And therefore her symptoms are probably more related to IBS  -continue Cimzia    Irritable bowel syndrome with both constipation and diarrhea  She complains of diffuse abdominal pain as constant  She does get some relief with Levsin as it allows her to sleep  She has tried Elavil as well but does not remember the dosage sure how long she was on it  She may benefit from another TCA or SSRI  She was also found to have be increased lymphocytes on upper endoscopy and would recommend ruling out celiac disease this that can also be causing her symptoms   -     Tissue transglutaminase, IgA; Future  -     IgA; Future    Gastroesophageal reflux disease without esophagitis  She continues to complain of heartburn symptoms despite Pepcid b i d  She states she has tried omeprazole and pantoprazole in the past without relief  She states she has made dietary modifications also without relief  I would recommend continuing working on dietary changes      Will see her back on a short-term follow-up in the next 2 months to see how she is feeling  Subjective:      Patient ID: Elizabet Morley is a 45 y o  female  HPI     This is a follow-up for Crohn's disease, abdominal pain and GERD as well as to discuss her endoscopy and colonoscopy  She has a long history of Crohn's disease and has had a previous small bowel resection  She had a CT enterography in June which showed TI inflammation  She had previously tried Humira, Pentasa & budesonide and was recently switched to Cimzia  She states she has had her 3 induction doses  She states her bowels have seem to slow down but the pain persists  She states she has decreased frequency with her bowel movements but they can fluctuate between hard and very soft  She states she has had chronic pain in her left upper quadrant but now has pain all over her abdomen  She has tried Bentyl, colestipol, Imodium and Elavil in the past, without improvement  She states she does take Levsin as needed which allows her to sleep and when she gets up the pain is less severe  She states she has no energy and feels weak  She does not want to get out of bed or sometimes even take a shower  She is feeling very depressed that she has not had any improvement  Her endoscopy showed mild antral gastritis and flattened villi and duodenum  Biopsies were negative for H pylori, she did have increased intra epithelial lymphocytes with normal villous architecture which could be seen in, early celiac, H pylori gastritis, NSAID use, bacterial overgrowth, tropical sprue an autoimmune diseases  Colonoscopy showed erosive disease in the TI, erosions in the cecum with burned out colitis, the rest of the colon was within normal limits  No abnormalities or inflammation were seen in the TI or cecum      Patient Active Problem List   Diagnosis    IBS (irritable bowel syndrome)    Pancreatitis    Gastroesophageal reflux disease without esophagitis  [facility]    DDD (degenerative disc disease), lumbosacral    Pelvic pain in female    Generalized abdominal pain    Overweight    Abnormal weight gain     Allergies   Allergen Reactions    Aspirin Other (See Comments)     "I was a baby, I have no idea"     Current Outpatient Prescriptions on File Prior to Visit   Medication Sig    certolizumab (CIMZIA PREFILLED) 2 x 200 mg/mL 400mg every 4 weeks    dicyclomine (BENTYL) 20 mg tablet Take 1 tablet (20 mg total) by mouth every 8 (eight) hours    diphenoxylate-atropine (LOMOTIL) 2 5-0 025 mg per tablet Take 1 tablet by mouth 4 (four) times a day as needed for diarrhea    famotidine (PEPCID) 40 MG tablet Take 1 tablet (40 mg total) by mouth 2 (two) times a day as needed for heartburn    hyoscyamine (LEVSIN/SL) 0 125 mg SL tablet Take 1 tablet (0 125 mg total) by mouth every 6 (six) hours as needed for cramping    [DISCONTINUED] HYDROcodone-acetaminophen (NORCO) 5-325 mg per tablet Take 1 tablet by mouth every 6 (six) hours    [DISCONTINUED] Na Sulfate-K Sulfate-Mg Sulf (SUPREP BOWEL PREP KIT) 17 5-3 13-1 6 GM/180ML SOLN Take 177 mL by mouth once for 1 dose     No current facility-administered medications on file prior to visit        Family History   Problem Relation Age of Onset    Hepatitis Mother     Thyroid disease Mother     Diabetes Father     Hypertension Father     Hyperlipidemia Father     Cataracts Father      Past Medical History:   Diagnosis Date    [facility]     [facility] disease of small intestine with rectal bleeding [facility]     DDD (degenerative disc disease), lumbosacral 4/13/2018    Gastroesophageal reflux disease without esophagitis 4/5/2018    Pneumonia     [facility] trait [facility]     Urinary tract infection     Visual impairment      Social History     Social History    Marital status: /Civil Union     Spouse name: N/A    Number of children: N/A    Years of education: N/A     Social History Main Topics    Smoking status: Never Smoker    Smokeless tobacco: Never Used    Alcohol use Yes      Comment: Occassionally    Drug use: No    Sexual activity: Yes     Partners: Male     Other Topics Concern    None     Social History Narrative    Lives with  and 2 kids, 15 and 8    Part time employment    house     Past Surgical History:   Procedure Laterality Date     SECTION      CHOLECYSTECTOMY      LIPOSUCTION      TX COLONOSCOPY FLX DX W/COLLJ SPEC WHEN PFRMD N/A 2018    Procedure: EGD AND COLONOSCOPY;  Surgeon: Haily Napier MD;  Location: [facility]; Service: Gastroenterology    SMALL INTESTINE SURGERY      TONSILLECTOMY           Review of Systems   All other systems reviewed and are negative  Objective:      /70 (BP Location: Left arm, Patient Position: Sitting, Cuff Size: Adult)   Pulse 72   Temp 99 2 °F (37 3 °C) (Tympanic)   Ht 5' 5" (1 651 m)   Wt 83 kg (183 lb)   BMI 30 45 kg/m²           Physical Exam   Constitutional: She is oriented to person, place, and time  She appears well-developed and well-nourished  No distress  HENT:   Head: Normocephalic and atraumatic  Eyes: Conjunctivae and EOM are normal    Neck: Normal range of motion  Cardiovascular: Normal rate and regular rhythm  Pulmonary/Chest: Effort normal and breath sounds normal    Abdominal: Soft  Bowel sounds are normal  There is tenderness (Diffuse)  Musculoskeletal: Normal range of motion  Neurological: She is alert and oriented to person, place, and time  Skin: Skin is warm and dry  Psychiatric: She has a normal mood and affect   Her behavior is normal

## 2018-12-18 NOTE — PROGRESS NOTES
Assessment/Plan:     Diagnoses and all orders for this visit:    Crohn's disease of small intestine with rectal bleeding ([address] )  She has a history of Crohn's disease with small bowel and colon involvement  She has tried multiple medications in the past and is currently on Cimzia  She states her stool frequency has decreased however she continues to have abdominal pain  Her biopsies did not show any active her chronic inflammation  And therefore her symptoms are probably more related to IBS  -continue Cimzia    Irritable bowel syndrome with both constipation and diarrhea  She complains of diffuse abdominal pain as constant  She does get some relief with Levsin as it allows her to sleep  She has tried Elavil as well but does not remember the dosage sure how long she was on it  She may benefit from another TCA or SSRI  She was also found to have be increased lymphocytes on upper endoscopy and would recommend ruling out celiac disease this that can also be causing her symptoms   -     Tissue transglutaminase, IgA; Future  -     IgA; Future    Gastroesophageal reflux disease without esophagitis  She continues to complain of heartburn symptoms despite Pepcid b i d  She states she has tried omeprazole and pantoprazole in the past without relief  She states she has made dietary modifications also without relief  I would recommend continuing working on dietary changes  Will see her back on a short-term follow-up in the next 2 months to see how she is feeling  Subjective:      Patient ID: Chato Rosales is a 45 y o  female  HPI     This is a follow-up for Crohn's disease, abdominal pain and GERD as well as to discuss her endoscopy and colonoscopy  She has a long history of Crohn's disease and has had a previous small bowel resection  She had a CT enterography in June which showed TI inflammation  She had previously tried Humira, Pentasa & budesonide and was recently switched to Cimzia    She states she has had her 3 induction doses  She states her bowels have seem to slow down but the pain persists  She states she has decreased frequency with her bowel movements but they can fluctuate between hard and very soft  She states she has had chronic pain in her left upper quadrant but now has pain all over her abdomen  She has tried Bentyl, colestipol, Imodium and Elavil in the past, without improvement  She states she does take Levsin as needed which allows her to sleep and when she gets up the pain is less severe  She states she has no energy and feels weak  She does not want to get out of bed or sometimes even take a shower  She is feeling very depressed that she has not had any improvement  Her endoscopy showed mild antral gastritis and flattened villi and duodenum  Biopsies were negative for H pylori, she did have increased intra epithelial lymphocytes with normal villous architecture which could be seen in, early celiac, H pylori gastritis, NSAID use, bacterial overgrowth, tropical sprue an autoimmune diseases  Colonoscopy showed erosive disease in the TI, erosions in the cecum with burned out colitis, the rest of the colon was within normal limits  No abnormalities or inflammation were seen in the TI or cecum      Patient Active Problem List   Diagnosis    IBS (irritable bowel syndrome)    Pancreatitis    Gastroesophageal reflux disease without esophagitis    [facility]    DDD (degenerative disc disease), lumbosacral    Pelvic pain in female    Generalized abdominal pain    Overweight    Abnormal weight gain     Allergies   Allergen Reactions    Aspirin Other (See Comments)     "I was a baby, I have no idea"     Current Outpatient Prescriptions on File Prior to Visit   Medication Sig    certolizumab (CIMZIA PREFILLED) 2 x 200 mg/mL 400mg every 4 weeks    dicyclomine (BENTYL) 20 mg tablet Take 1 tablet (20 mg total) by mouth every 8 (eight) hours    diphenoxylate-atropine (LOMOTIL) 2 5-0 025 mg per tablet Take 1 tablet by mouth 4 (four) times a day as needed for diarrhea    famotidine (PEPCID) 40 MG tablet Take 1 tablet (40 mg total) by mouth 2 (two) times a day as needed for heartburn    hyoscyamine (LEVSIN/SL) 0 125 mg SL tablet Take 1 tablet (0 125 mg total) by mouth every 6 (six) hours as needed for cramping    [DISCONTINUED] HYDROcodone-acetaminophen (NORCO) 5-325 mg per tablet Take 1 tablet by mouth every 6 (six) hours    [DISCONTINUED] Na Sulfate-K Sulfate-Mg Sulf (SUPREP BOWEL PREP KIT) 17 5-3 13-1 6 GM/180ML SOLN Take 177 mL by mouth once for 1 dose     No current facility-administered medications on file prior to visit        Family History   Problem Relation Age of Onset    Hepatitis Mother     Thyroid disease Mother     Diabetes Father     Hypertension Father     Hyperlipidemia Father     Cataracts Father      Past Medical History:   Diagnosis Date    [facility]     [facility]'s disease of small intestine with rectal bleeding [facility]     DDD (degenerative disc disease), lumbosacral 2018    Gastroesophageal reflux disease without esophagitis 2018    Pneumonia     [facility]ick[facility] trait [facility]     Urinary tract infection     Visual impairment      Social History     Social History    Marital status: /Civil Union     Spouse name: N/A    Number of children: N/A    Years of education: N/A     Social History Main Topics    Smoking status: Never Smoker    Smokeless tobacco: Never Used    Alcohol use Yes      Comment: Occassionally    Drug use: No    Sexual activity: Yes     Partners: Male     Other Topics Concern    None     Social History Narrative    Lives with  and 2 kids, 15 and 8    Part time employment    house     Past Surgical History:   Procedure Laterality Date     SECTION      CHOLECYSTECTOMY      LIPOSUCTION      ID COLONOSCOPY FLX DX W/COLLJ SPEC WHEN PFRMD N/A 12/7/2018    Procedure: EGD AND COLONOSCOPY;  Surgeon: Tayo Conner MD;  Location: [facility]; Service: Gastroenterology    SMALL INTESTINE SURGERY      TONSILLECTOMY           Review of Systems   All other systems reviewed and are negative  Objective:      /70 (BP Location: Left arm, Patient Position: Sitting, Cuff Size: Adult)   Pulse 72   Temp 99 2 °F (37 3 °C) (Tympanic)   Ht 5' 5" (1 651 m)   Wt 83 kg (183 lb)   BMI 30 45 kg/m²          Physical Exam   Constitutional: She is oriented to person, place, and time  She appears well-developed and well-nourished  No distress  HENT:   Head: Normocephalic and atraumatic  Eyes: Conjunctivae and EOM are normal    Neck: Normal range of motion  Cardiovascular: Normal rate and regular rhythm  Pulmonary/Chest: Effort normal and breath sounds normal    Abdominal: Soft  Bowel sounds are normal  There is tenderness (Diffuse)  Musculoskeletal: Normal range of motion  Neurological: She is alert and oriented to person, place, and time  Skin: Skin is warm and dry  Psychiatric: She has a normal mood and affect   Her behavior is normal

## 2018-12-20 LAB — TTG IGA SER-ACNC: <2 U/ML (ref 0–3)

## 2019-01-08 ENCOUNTER — TELEPHONE (OUTPATIENT)
Dept: GASTROENTEROLOGY | Facility: CLINIC | Age: 39
End: 2019-01-08

## 2019-01-08 DIAGNOSIS — R10.9 ABDOMINAL PAIN, UNSPECIFIED ABDOMINAL LOCATION: Primary | ICD-10-CM

## 2019-01-08 NOTE — TELEPHONE ENCOUNTER
- I would like to order labs on her CBC, CMP, CRP, ESR      -I will defer whether she should continue Cimzia to Dr Norma Oliver as she is familiar with her history and has complicated history of intolerance to medications    -I would avoid NSAIDs for pain management, she can take tylenol     -If she has worsened severe abdominal pain I would recommend she be evaluated in the ED

## 2019-01-08 NOTE — TELEPHONE ENCOUNTER
misha patient      She has had stomach and back  pain for 2 weeks, and medication is not helping      Call back #   [phone]

## 2019-01-08 NOTE — TELEPHONE ENCOUNTER
Patient aware blood work ordered and she will go to [facility] lab to complete  She will continue taking current  medication and avoid NSAIDS  She understands if symptoms become severe she should report to ER for evaluation

## 2019-01-08 NOTE — TELEPHONE ENCOUNTER
Dr Kailyn Espitia patient- Hx Crohn's -currently taking Cimzia  Patient called for "stomach and back pain"  For 2 weeks  Pain described as pressure of 8/10, increase pain in joints and tiredness  No fever  Rash on left arm for 2 weeks- possible Cimzia?    Bowels/diarrhea stable  States she is taking all medications as prescribed and they are not helping  She is requesting something to decrease pain

## 2019-01-09 ENCOUNTER — TRANSCRIBE ORDERS (OUTPATIENT)
Dept: LAB | Facility: MEDICAL CENTER | Age: 39
End: 2019-01-09

## 2019-01-09 DIAGNOSIS — K50.819 CROHN'S DISEASE OF SMALL AND LARGE INTESTINES WITH COMPLICATION (HCC): Primary | ICD-10-CM

## 2019-01-16 ENCOUNTER — APPOINTMENT (OUTPATIENT)
Dept: LAB | Facility: MEDICAL CENTER | Age: 39
End: 2019-01-16
Payer: COMMERCIAL

## 2019-01-16 DIAGNOSIS — E78.1 HYPERTRIGLYCERIDEMIA: ICD-10-CM

## 2019-01-16 DIAGNOSIS — R10.9 ABDOMINAL PAIN, UNSPECIFIED ABDOMINAL LOCATION: ICD-10-CM

## 2019-01-16 LAB
ALBUMIN SERPL BCP-MCNC: 3.8 G/DL (ref 3.5–5)
ALP SERPL-CCNC: 101 U/L (ref 46–116)
ALT SERPL W P-5'-P-CCNC: 37 U/L (ref 12–78)
ANION GAP SERPL CALCULATED.3IONS-SCNC: 5 MMOL/L (ref 4–13)
AST SERPL W P-5'-P-CCNC: 17 U/L (ref 5–45)
BASOPHILS # BLD AUTO: 0.05 THOUSANDS/ΜL (ref 0–0.1)
BASOPHILS NFR BLD AUTO: 1 % (ref 0–1)
BILIRUB SERPL-MCNC: 0.48 MG/DL (ref 0.2–1)
BUN SERPL-MCNC: 11 MG/DL (ref 5–25)
CALCIUM SERPL-MCNC: 8.9 MG/DL (ref 8.3–10.1)
CHLORIDE SERPL-SCNC: 106 MMOL/L (ref 100–108)
CHOLEST SERPL-MCNC: 162 MG/DL (ref 50–200)
CO2 SERPL-SCNC: 26 MMOL/L (ref 21–32)
CREAT SERPL-MCNC: 0.75 MG/DL (ref 0.6–1.3)
CRP SERPL QL: 4 MG/L
EOSINOPHIL # BLD AUTO: 0.09 THOUSAND/ΜL (ref 0–0.61)
EOSINOPHIL NFR BLD AUTO: 1 % (ref 0–6)
ERYTHROCYTE [DISTWIDTH] IN BLOOD BY AUTOMATED COUNT: 13.2 % (ref 11.6–15.1)
ERYTHROCYTE [SEDIMENTATION RATE] IN BLOOD: 9 MM/HOUR (ref 0–20)
GFR SERPL CREATININE-BSD FRML MDRD: 101 ML/MIN/1.73SQ M
GLUCOSE P FAST SERPL-MCNC: 77 MG/DL (ref 65–99)
HCT VFR BLD AUTO: 38.1 % (ref 34.8–46.1)
HDLC SERPL-MCNC: 43 MG/DL (ref 40–60)
HGB BLD-MCNC: 12.7 G/DL (ref 11.5–15.4)
IMM GRANULOCYTES # BLD AUTO: 0.02 THOUSAND/UL (ref 0–0.2)
IMM GRANULOCYTES NFR BLD AUTO: 0 % (ref 0–2)
LDLC SERPL CALC-MCNC: 68 MG/DL (ref 0–100)
LYMPHOCYTES # BLD AUTO: 2.73 THOUSANDS/ΜL (ref 0.6–4.47)
LYMPHOCYTES NFR BLD AUTO: 37 % (ref 14–44)
MCH RBC QN AUTO: 29.4 PG (ref 26.8–34.3)
MCHC RBC AUTO-ENTMCNC: 33.3 G/DL (ref 31.4–37.4)
MCV RBC AUTO: 88 FL (ref 82–98)
MONOCYTES # BLD AUTO: 0.49 THOUSAND/ΜL (ref 0.17–1.22)
MONOCYTES NFR BLD AUTO: 7 % (ref 4–12)
NEUTROPHILS # BLD AUTO: 3.99 THOUSANDS/ΜL (ref 1.85–7.62)
NEUTS SEG NFR BLD AUTO: 54 % (ref 43–75)
NRBC BLD AUTO-RTO: 0 /100 WBCS
PLATELET # BLD AUTO: 298 THOUSANDS/UL (ref 149–390)
PMV BLD AUTO: 10.3 FL (ref 8.9–12.7)
POTASSIUM SERPL-SCNC: 3.8 MMOL/L (ref 3.5–5.3)
PROT SERPL-MCNC: 7.9 G/DL (ref 6.4–8.2)
RBC # BLD AUTO: 4.32 MILLION/UL (ref 3.81–5.12)
SODIUM SERPL-SCNC: 137 MMOL/L (ref 136–145)
TRIGL SERPL-MCNC: 255 MG/DL
WBC # BLD AUTO: 7.37 THOUSAND/UL (ref 4.31–10.16)

## 2019-01-16 PROCEDURE — 85025 COMPLETE CBC W/AUTO DIFF WBC: CPT

## 2019-01-16 PROCEDURE — 86140 C-REACTIVE PROTEIN: CPT

## 2019-01-16 PROCEDURE — 36415 COLL VENOUS BLD VENIPUNCTURE: CPT

## 2019-01-16 PROCEDURE — 85652 RBC SED RATE AUTOMATED: CPT

## 2019-01-16 PROCEDURE — 80061 LIPID PANEL: CPT

## 2019-01-16 PROCEDURE — 80053 COMPREHEN METABOLIC PANEL: CPT

## 2019-01-17 NOTE — TELEPHONE ENCOUNTER
Dr Karin Doran patient - Hx Crohn's   Patient initial call on 1/8 as noted and completed ordered blood work  Patient calling today because she would like a sooner appointment  She reports same symptoms continue/increase- back pain, tiredness, SOB  She states symptoms improve when taking Bentyl, but can not take as prescribed because of her job/drowsy  She would like to discuss other options/symptoms at office visit  She understands if symptoms become severe she will go to ER

## 2019-01-21 ENCOUNTER — OFFICE VISIT (OUTPATIENT)
Dept: GASTROENTEROLOGY | Facility: MEDICAL CENTER | Age: 39
End: 2019-01-21
Payer: COMMERCIAL

## 2019-01-21 ENCOUNTER — DOCUMENTATION (OUTPATIENT)
Dept: GASTROENTEROLOGY | Facility: MEDICAL CENTER | Age: 39
End: 2019-01-21

## 2019-01-21 ENCOUNTER — TELEPHONE (OUTPATIENT)
Dept: GASTROENTEROLOGY | Facility: MEDICAL CENTER | Age: 39
End: 2019-01-21

## 2019-01-21 VITALS
WEIGHT: 186 LBS | TEMPERATURE: 98.6 F | SYSTOLIC BLOOD PRESSURE: 122 MMHG | HEIGHT: 65 IN | DIASTOLIC BLOOD PRESSURE: 70 MMHG | HEART RATE: 77 BPM | BODY MASS INDEX: 30.99 KG/M2

## 2019-01-21 DIAGNOSIS — R10.32 LEFT LOWER QUADRANT PAIN: Primary | ICD-10-CM

## 2019-01-21 DIAGNOSIS — K50.011 CROHN'S DISEASE OF SMALL INTESTINE WITH RECTAL BLEEDING (HCC): ICD-10-CM

## 2019-01-21 DIAGNOSIS — K50.00 CROHN'S DISEASE OF SMALL INTESTINE WITHOUT COMPLICATION (HCC): ICD-10-CM

## 2019-01-21 DIAGNOSIS — R19.7 DIARRHEA, UNSPECIFIED TYPE: ICD-10-CM

## 2019-01-21 DIAGNOSIS — K58.0 IRRITABLE BOWEL SYNDROME WITH DIARRHEA: ICD-10-CM

## 2019-01-21 PROCEDURE — 99214 OFFICE O/P EST MOD 30 MIN: CPT | Performed by: INTERNAL MEDICINE

## 2019-01-21 RX ORDER — MONTELUKAST SODIUM 4 MG/1
1 TABLET, CHEWABLE ORAL 2 TIMES DAILY
Qty: 90 TABLET | Refills: 3 | Status: SHIPPED | OUTPATIENT
Start: 2019-01-21 | End: 2019-03-01

## 2019-01-21 NOTE — PROGRESS NOTES
[facility] - Outpatient Consultation  Lauren Quick 45 y o  female MRN: [id number]  Encounter: [id number]      PCP: Audrey Moran DO  Referring: No referring provider defined for this encounter  ASSESSMENT AND PLAN:      1  Left lower quadrant pain  2  Irritable bowel syndrome with diarrhea  3  Crohn's disease of small intestine without complication (HCC)  Persistent symptoms of diarrhea/abdominal pain with mild TI/cecal disease  - increase cimzia q2 weeks  - referral to rheum for joint disease, may require referral to derm for skin rash; recommended to ice site of planned injection prior to injection  - restart colestipol for treatment of component of bile acid diarrhea  - if symptoms are refractory, may require switch of medical therapy, eg to consider Stelara as we previously discussed, but was not covered by her insurance company    - colestipol (COLESTID) 1 g tablet; Take 1 tablet (1 g total) by mouth 2 (two) times a day  Dispense: 90 tablet; Refill: 3  - Ambulatory referral to Rheumatology; Future  - certolizumab (CIMZIA PREFILLED) 2 x 200 mg/mL; 400mg every 2 weeks  Dispense: 1 kit; Refill: 3          ______________________________________________________________________    HPI:      Patient is a 45year old female who sees me in follow up for small intestinal Crohn's disease and IBS  CT enterography in June was significant for TI inflammation  Follow up  endoscopy showed mild antral gastritis and flattened villi and duodenum  Biopsies were negative for H pylori, she did have increased intra epithelial lymphocytes with normal villous architecture  Colonoscopy showed consistent erosive disease in the TI, erosions in the cecum with burned out colitis, the rest of the colon was within normal limits  Celiac testing was negative  She had previously tried/failed Humira, Pentasa & budesonide  She is on Cimzia 200 mg q4 weeks   Initially this was helping her when started in October, she has taken 7-8 injections, but more recently symptoms are worsening  She describes constant abdominal pain that is severe, worst in her LLQ, but also present in her RLQ  Symptoms are associated with 6 bowel movements/day, occurring several times after waking in the morning and again, after dinner when returning from work  Bowel movements are associated with fecal urgency  She had one episode of rectal bleeding one week ago that was self-resolved  Levsin did improve abdominal pain, but has resulted in fatigue  She complains of increased shortness of breath, right knee pain, back pain, and debilitating fatigue that limits her quality of life and getting out of bed in the morning  REVIEW OF SYSTEMS:    CONSTITUTIONAL: Denies any fever, chills, rigors, and weight loss  HEENT: No earache or tinnitus  Denies hearing loss or visual disturbances  CARDIOVASCULAR: No chest pain or palpitations  RESPIRATORY: Denies any cough, hemoptysis, shortness of breath or dyspnea on exertion  GASTROINTESTINAL: As noted in the History of Present Illness  GENITOURINARY: No problems with urination  Denies any hematuria or dysuria  NEUROLOGIC: No dizziness or vertigo, denies headaches  MUSCULOSKELETAL: Denies any muscle or joint pain  SKIN: Denies skin rashes or itching  ENDOCRINE: Denies excessive thirst  Denies intolerance to heat or cold  PSYCHOSOCIAL: Denies depression or anxiety  Denies any recent memory loss         Historical Information   Past Medical History:   Diagnosis Date    [facility]     Crohn's disease of small intestine with rectal bleeding ([facility]     DDD (degenerative disc disease), lumbosacral 2018    Gastroesophageal reflux disease without esophagitis 2018    Pneumonia     Sickle cell trait (HCC)     Urinary tract infection     Visual impairment      Past Surgical History:   Procedure Laterality Date     SECTION      CHOLECYSTECTOMY      LIPOSUCTION  RI COLONOSCOPY FLX DX W/COLLJ SPEC WHEN PFRMD N/A 12/7/2018    Procedure: EGD AND COLONOSCOPY;  Surgeon: Sammie Hamilton MD;  Location: [facility]; Service: Gastroenterology    SMALL INTESTINE SURGERY      TONSILLECTOMY       Social History   History   Alcohol Use    Yes     Comment: Occassionally     History   Drug Use No     History   Smoking Status    Never Smoker   Smokeless Tobacco    Never Used     Family History   Problem Relation Age of Onset    Hepatitis Mother     Thyroid disease Mother     Diabetes Father     Hypertension Father     Hyperlipidemia Father     Cataracts Father        Meds/Allergies       Current Outpatient Prescriptions:     certolizumab (CIMZIA PREFILLED) 2 x 200 mg/mL    colestipol (COLESTID) 1 g tablet    dicyclomine (BENTYL) 20 mg tablet    diphenoxylate-atropine (LOMOTIL) 2 5-0 025 mg per tablet    famotidine (PEPCID) 40 MG tablet    hyoscyamine (LEVSIN/SL) 0 125 mg SL tablet    Allergies   Allergen Reactions    Aspirin Other (See Comments)     "I was a baby, I have no idea"           Objective     Blood pressure 122/70, pulse 77, temperature 98 6 °F (37 °C), temperature source Tympanic, height 5' 5" (1 651 m), weight 84 4 kg (186 lb), not currently breastfeeding  Body mass index is 30 95 kg/m²  PHYSICAL EXAM:      General Appearance:   Alert, cooperative, no distress   HEENT:   Normocephalic, atraumatic, anicteric      Neck:  Supple, symmetrical, trachea midline   Lungs:   Clear to auscultation bilaterally; no rales, rhonchi or wheezing; respirations unlabored    Heart[de-identified]   Regular rate and rhythm; no murmur, rub, or gallop     Abdomen:   Soft, non-tender, non-distended; normal bowel sounds; no masses, no organomegaly    Genitalia:   Deferred    Rectal:   Deferred    Extremities:  No cyanosis, clubbing or edema    Pulses:  2+ and symmetric    Skin:  No jaundice, rashes, or lesions    Lymph nodes:  No palpable cervical lymphadenopathy        Lab Results: Lab Results   Component Value Date    WBC 7 37 01/16/2019    HGB 12 7 01/16/2019    HCT 38 1 01/16/2019    MCV 88 01/16/2019     01/16/2019       Lab Results   Component Value Date    K 3 8 01/16/2019     01/16/2019    CO2 26 01/16/2019    BUN 11 01/16/2019    CREATININE 0 75 01/16/2019    GLUF 77 01/16/2019    CALCIUM 8 9 01/16/2019    AST 17 01/16/2019    ALT 37 01/16/2019    ALKPHOS 101 01/16/2019    EGFR 101 01/16/2019       No results found for: INR, PROTIME      Radiology Results:   No results found

## 2019-01-21 NOTE — LETTER
January 29, 2019     Estelita Dennis DO  [address] [place]    Patient: Zeny Jeong   YOB: 1980   Date of Visit: 1/21/2019       Dear Dr Hien Tolbert:    I saw Zeny Jeong in follow up  Below are my notes for this consultation  If you have questions, please do not hesitate to call me  I look forward to following your patient along with you  Sincerely,      CANDE Toro  [facility]  Mobile: [phone]  Available on [email or web address]           CC: No Recipients  Cira Dick MD  1/29/2019 10:03 PM  Sign at close encounter  [address] [facility] - Outpatient Consultation  Zeny Jeong 45 y o  female MRN: [id number]  Encounter: [id number]      PCP: Estelita Dennis DO  Referring: No referring provider defined for this encounter  ASSESSMENT AND PLAN:      1  Left lower quadrant pain  2  Irritable bowel syndrome with diarrhea  3  Crohn's disease of small intestine without complication (HCC)  Persistent symptoms of diarrhea/abdominal pain with mild TI/cecal disease  - increase cimzia q2 weeks  - referral to rheum for joint disease, may require referral to derm for skin rash; recommended to ice site of planned injection prior to injection  - restart colestipol for treatment of component of bile acid diarrhea  - if symptoms are refractory, may require switch of medical therapy, eg to consider Stelara as we previously discussed, but was not covered by her insurance company    - colestipol (COLESTID) 1 g tablet; Take 1 tablet (1 g total) by mouth 2 (two) times a day  Dispense: 90 tablet; Refill: 3  - Ambulatory referral to Rheumatology; Future  - certolizumab (CIMZIA PREFILLED) 2 x 200 mg/mL; 400mg every 2 weeks  Dispense: 1 kit;  Refill: 3          ______________________________________________________________________    HPI:      Patient is a 45year old female who sees me in follow up for small intestinal Crohn's disease and IBS  CT enterography in June was significant for TI inflammation  Follow up  endoscopy showed mild antral gastritis and flattened villi and duodenum  Biopsies were negative for H pylori, she did have increased intra epithelial lymphocytes with normal villous architecture  Colonoscopy showed consistent erosive disease in the TI, erosions in the cecum with burned out colitis, the rest of the colon was within normal limits  Celiac testing was negative  She had previously tried/failed Humira, Pentasa & budesonide   She is on Cimzia 200 mg q4 weeks  Initially this was helping her when started in October, she has taken 7-8 injections, but more recently symptoms are worsening  She describes constant abdominal pain that is severe, worst in her LLQ, but also present in her RLQ  Symptoms are associated with 6 bowel movements/day, occurring several times after waking in the morning and again, after dinner when returning from work  Bowel movements are associated with fecal urgency  She had one episode of rectal bleeding one week ago that was self-resolved  Levsin did improve abdominal pain, but has resulted in fatigue  She complains of increased shortness of breath, right knee pain, back pain, and debilitating fatigue that limits her quality of life and getting out of bed in the morning  REVIEW OF SYSTEMS:    CONSTITUTIONAL: Denies any fever, chills, rigors, and weight loss  HEENT: No earache or tinnitus  Denies hearing loss or visual disturbances  CARDIOVASCULAR: No chest pain or palpitations  RESPIRATORY: Denies any cough, hemoptysis, shortness of breath or dyspnea on exertion  GASTROINTESTINAL: As noted in the History of Present Illness  GENITOURINARY: No problems with urination  Denies any hematuria or dysuria  NEUROLOGIC: No dizziness or vertigo, denies headaches  MUSCULOSKELETAL: Denies any muscle or joint pain  SKIN: Denies skin rashes or itching     ENDOCRINE: Denies excessive thirst  Denies intolerance to heat or cold  PSYCHOSOCIAL: Denies depression or anxiety  Denies any recent memory loss  Historical Information   Past Medical History:   Diagnosis Date    Crohn disease ([address] )     Crohn's disease of small intestine with rectal bleeding (HCC)     DDD (degenerative disc disease), lumbosacral 2018    Gastroesophageal reflux disease without esophagitis 2018    Pneumonia     Sickle cell trait (HCC)     Urinary tract infection     Visual impairment      Past Surgical History:   Procedure Laterality Date     SECTION      CHOLECYSTECTOMY      LIPOSUCTION      [place] COLONOSCOPY FLX DX W/COLLJ SPEC WHEN PFRMD N/A 2018    Procedure: EGD AND COLONOSCOPY;  Surgeon: Jelena Bergeron MD;  Location: [facility]; Service: Gastroenterology    SMALL INTESTINE SURGERY      TONSILLECTOMY       Social History   History   Alcohol Use    Yes     Comment: Occassionally     History   Drug Use No     History   Smoking Status    Never Smoker   Smokeless Tobacco    Never Used     Family History   Problem Relation Age of Onset    Hepatitis Mother     Thyroid disease Mother     Diabetes Father     Hypertension Father     Hyperlipidemia Father     Cataracts Father        Meds/Allergies       Current Outpatient Prescriptions:     certolizumab (CIMZIA PREFILLED) 2 x 200 mg/mL    colestipol (COLESTID) 1 g tablet    dicyclomine (BENTYL) 20 mg tablet    diphenoxylate-atropine (LOMOTIL) 2 5-0 025 mg per tablet    famotidine (PEPCID) 40 MG tablet    hyoscyamine (LEVSIN/SL) 0 125 mg SL tablet    Allergies   Allergen Reactions    Aspirin Other (See Comments)     "I was a baby, I have no idea"           Objective     Blood pressure 122/70, pulse 77, temperature 98 6 °F (37 °C), temperature source Tympanic, height 5' 5" (1 651 m), weight 84 4 kg (186 lb), not currently breastfeeding  Body mass index is 30 95 kg/m²       PHYSICAL EXAM:      General Appearance:   Alert, cooperative, no distress   HEENT:   Normocephalic, atraumatic, anicteric      Neck:  Supple, symmetrical, trachea midline   Lungs:   Clear to auscultation bilaterally; no rales, rhonchi or wheezing; respirations unlabored    Heart[de-identified]   Regular rate and rhythm; no murmur, rub, or gallop  Abdomen:   Soft, non-tender, non-distended; normal bowel sounds; no masses, no organomegaly    Genitalia:   Deferred    Rectal:   Deferred    Extremities:  No cyanosis, clubbing or edema    Pulses:  2+ and symmetric    Skin:  No jaundice, rashes, or lesions    Lymph nodes:  No palpable cervical lymphadenopathy        Lab Results:     Lab Results   Component Value Date    WBC 7 37 01/16/2019    HGB 12 7 01/16/2019    HCT 38 1 01/16/2019    MCV 88 01/16/2019     01/16/2019       Lab Results   Component Value Date    K 3 8 01/16/2019     01/16/2019    CO2 26 01/16/2019    BUN 11 01/16/2019    CREATININE 0 75 01/16/2019    GLUF 77 01/16/2019    CALCIUM 8 9 01/16/2019    AST 17 01/16/2019    ALT 37 01/16/2019    ALKPHOS 101 01/16/2019    EGFR 101 01/16/2019       No results found for: INR, PROTIME      Radiology Results:   No results found

## 2019-01-21 NOTE — PROGRESS NOTES
Called [facility] at [phone] to verify dosage being dispensed and they informed me that it was being dispensed as 200mg every 2 weeks  I spoke with Vangie Velazco who then updated the dosage to 400mg every 2 weeks  The patient should be getting 6 boxed for a 3 month supply

## 2019-02-04 ENCOUNTER — HOSPITAL ENCOUNTER (OUTPATIENT)
Dept: RADIOLOGY | Facility: HOSPITAL | Age: 39
Discharge: HOME | End: 2019-02-04
Attending: INTERNAL MEDICINE
Payer: COMMERCIAL

## 2019-02-04 ENCOUNTER — APPOINTMENT (OUTPATIENT)
Dept: LAB | Facility: HOSPITAL | Age: 39
End: 2019-02-04
Attending: INTERNAL MEDICINE
Payer: COMMERCIAL

## 2019-02-04 ENCOUNTER — TRANSCRIBE ORDERS (OUTPATIENT)
Dept: RADIOLOGY | Facility: HOSPITAL | Age: 39
End: 2019-02-04

## 2019-02-04 DIAGNOSIS — K50.819 CROHN'S DISEASE OF SMALL AND LARGE INTESTINES WITH COMPLICATION (HCC): ICD-10-CM

## 2019-02-04 DIAGNOSIS — K50.90 CROHN'S DISEASE WITHOUT COMPLICATION, UNSPECIFIED GASTROINTESTINAL TRACT LOCATION (HCC): Primary | ICD-10-CM

## 2019-02-04 DIAGNOSIS — K50.90 CROHN'S DISEASE WITHOUT COMPLICATION, UNSPECIFIED GASTROINTESTINAL TRACT LOCATION (HCC): ICD-10-CM

## 2019-02-04 LAB
ALBUMIN SERPL BCP-MCNC: 3.6 G/DL (ref 3.5–5)
ALP SERPL-CCNC: 130 U/L (ref 46–116)
ALT SERPL W P-5'-P-CCNC: 75 U/L (ref 12–78)
ANION GAP SERPL CALCULATED.3IONS-SCNC: 7 MMOL/L (ref 4–13)
AST SERPL W P-5'-P-CCNC: 56 U/L (ref 5–45)
BASOPHILS # BLD AUTO: 0.05 THOUSANDS/ΜL (ref 0–0.1)
BASOPHILS NFR BLD AUTO: 1 % (ref 0–1)
BILIRUB SERPL-MCNC: 0.24 MG/DL (ref 0.2–1)
BUN SERPL-MCNC: 10 MG/DL (ref 5–25)
C3 SERPL-MCNC: 162 MG/DL (ref 90–180)
C4 SERPL-MCNC: 38 MG/DL (ref 10–40)
CALCIUM SERPL-MCNC: 8.2 MG/DL (ref 8.3–10.1)
CHLORIDE SERPL-SCNC: 108 MMOL/L (ref 100–108)
CO2 SERPL-SCNC: 24 MMOL/L (ref 21–32)
CREAT SERPL-MCNC: 0.76 MG/DL (ref 0.6–1.3)
CRP SERPL QL: 6.8 MG/L
EOSINOPHIL # BLD AUTO: 0.12 THOUSAND/ΜL (ref 0–0.61)
EOSINOPHIL NFR BLD AUTO: 1 % (ref 0–6)
ERYTHROCYTE [DISTWIDTH] IN BLOOD BY AUTOMATED COUNT: 13.2 % (ref 11.6–15.1)
ERYTHROCYTE [SEDIMENTATION RATE] IN BLOOD: 11 MM/HOUR (ref 0–20)
GFR SERPL CREATININE-BSD FRML MDRD: 100 ML/MIN/1.73SQ M
GLUCOSE P FAST SERPL-MCNC: 75 MG/DL (ref 65–99)
HAV IGM SER QL: NORMAL
HBV CORE IGM SER QL: NORMAL
HBV SURFACE AG SER QL: NORMAL
HCT VFR BLD AUTO: 35.9 % (ref 34.8–46.1)
HCV AB SER QL: NORMAL
HGB BLD-MCNC: 11.8 G/DL (ref 11.5–15.4)
IMM GRANULOCYTES # BLD AUTO: 0.04 THOUSAND/UL (ref 0–0.2)
IMM GRANULOCYTES NFR BLD AUTO: 1 % (ref 0–2)
LYMPHOCYTES # BLD AUTO: 4.17 THOUSANDS/ΜL (ref 0.6–4.47)
LYMPHOCYTES NFR BLD AUTO: 47 % (ref 14–44)
MCH RBC QN AUTO: 29.1 PG (ref 26.8–34.3)
MCHC RBC AUTO-ENTMCNC: 32.9 G/DL (ref 31.4–37.4)
MCV RBC AUTO: 89 FL (ref 82–98)
MONOCYTES # BLD AUTO: 0.67 THOUSAND/ΜL (ref 0.17–1.22)
MONOCYTES NFR BLD AUTO: 8 % (ref 4–12)
NEUTROPHILS # BLD AUTO: 3.63 THOUSANDS/ΜL (ref 1.85–7.62)
NEUTS SEG NFR BLD AUTO: 42 % (ref 43–75)
NRBC BLD AUTO-RTO: 0 /100 WBCS
PLATELET # BLD AUTO: 273 THOUSANDS/UL (ref 149–390)
PMV BLD AUTO: 10 FL (ref 8.9–12.7)
POTASSIUM SERPL-SCNC: 3.9 MMOL/L (ref 3.5–5.3)
PROT SERPL-MCNC: 7.1 G/DL (ref 6.4–8.2)
RBC # BLD AUTO: 4.05 MILLION/UL (ref 3.81–5.12)
SODIUM SERPL-SCNC: 139 MMOL/L (ref 136–145)
T4 SERPL-MCNC: 8.3 UG/DL (ref 4.7–13.3)
TSH SERPL DL<=0.05 MIU/L-ACNC: 1.96 UIU/ML (ref 0.36–3.74)
WBC # BLD AUTO: 8.68 THOUSAND/UL (ref 4.31–10.16)

## 2019-02-04 PROCEDURE — 72110 X-RAY EXAM L-2 SPINE 4/>VWS: CPT

## 2019-02-04 PROCEDURE — 81374 HLA I TYPING 1 ANTIGEN LR: CPT

## 2019-02-04 PROCEDURE — 84436 ASSAY OF TOTAL THYROXINE: CPT

## 2019-02-04 PROCEDURE — 86235 NUCLEAR ANTIGEN ANTIBODY: CPT

## 2019-02-04 PROCEDURE — 84443 ASSAY THYROID STIM HORMONE: CPT

## 2019-02-04 PROCEDURE — 80053 COMPREHEN METABOLIC PANEL: CPT

## 2019-02-04 PROCEDURE — 85652 RBC SED RATE AUTOMATED: CPT

## 2019-02-04 PROCEDURE — 86225 DNA ANTIBODY NATIVE: CPT

## 2019-02-04 PROCEDURE — 84165 PROTEIN E-PHORESIS SERUM: CPT | Performed by: PATHOLOGY

## 2019-02-04 PROCEDURE — 85025 COMPLETE CBC W/AUTO DIFF WBC: CPT

## 2019-02-04 PROCEDURE — 86430 RHEUMATOID FACTOR TEST QUAL: CPT

## 2019-02-04 PROCEDURE — 84165 PROTEIN E-PHORESIS SERUM: CPT

## 2019-02-04 PROCEDURE — 86200 CCP ANTIBODY: CPT

## 2019-02-04 PROCEDURE — 80074 ACUTE HEPATITIS PANEL: CPT

## 2019-02-04 PROCEDURE — 86140 C-REACTIVE PROTEIN: CPT

## 2019-02-04 PROCEDURE — 36415 COLL VENOUS BLD VENIPUNCTURE: CPT

## 2019-02-04 PROCEDURE — 72202 X-RAY EXAM SI JOINTS 3/> VWS: CPT

## 2019-02-04 PROCEDURE — 86160 COMPLEMENT ANTIGEN: CPT

## 2019-02-04 PROCEDURE — 86038 ANTINUCLEAR ANTIBODIES: CPT

## 2019-02-05 LAB
CCP IGA+IGG SERPL IA-ACNC: 5 UNITS (ref 0–19)
DSDNA AB SER-ACNC: <1 IU/ML (ref 0–9)
ENA RNP AB SER-ACNC: <0.2 AI (ref 0–0.9)
ENA SM AB SER-ACNC: <0.2 AI (ref 0–0.9)
ENA SS-A AB SER-ACNC: <0.2 AI (ref 0–0.9)
ENA SS-B AB SER-ACNC: <0.2 AI (ref 0–0.9)
RHEUMATOID FACT SER QL LA: NEGATIVE

## 2019-02-06 LAB
ALBUMIN SERPL ELPH-MCNC: 4.05 G/DL (ref 3.5–5)
ALBUMIN SERPL ELPH-MCNC: 57.1 % (ref 52–65)
ALPHA1 GLOB SERPL ELPH-MCNC: 0.31 G/DL (ref 0.1–0.4)
ALPHA1 GLOB SERPL ELPH-MCNC: 4.3 % (ref 2.5–5)
ALPHA2 GLOB SERPL ELPH-MCNC: 0.79 G/DL (ref 0.4–1.2)
ALPHA2 GLOB SERPL ELPH-MCNC: 11.1 % (ref 7–13)
BETA GLOB ABNORMAL SERPL ELPH-MCNC: 0.43 G/DL (ref 0.4–0.8)
BETA1 GLOB SERPL ELPH-MCNC: 6.1 % (ref 5–13)
BETA2 GLOB SERPL ELPH-MCNC: 6.4 % (ref 2–8)
BETA2+GAMMA GLOB SERPL ELPH-MCNC: 0.45 G/DL (ref 0.2–0.5)
GAMMA GLOB ABNORMAL SERPL ELPH-MCNC: 1.07 G/DL (ref 0.5–1.6)
GAMMA GLOB SERPL ELPH-MCNC: 15 % (ref 12–22)
HISTONE IGG SER IA-ACNC: 0.3 UNITS (ref 0–0.9)
IGG/ALB SER: 1.33 {RATIO} (ref 1.1–1.8)
PROT PATTERN SERPL ELPH-IMP: NORMAL
PROT SERPL-MCNC: 7.1 G/DL (ref 6.4–8.2)
RYE IGE QN: NEGATIVE

## 2019-02-08 ENCOUNTER — TELEPHONE (OUTPATIENT)
Dept: FAMILY MEDICINE CLINIC | Facility: CLINIC | Age: 39
End: 2019-02-08

## 2019-02-08 LAB — HLA-B27 QL NAA+PROBE: NEGATIVE

## 2019-03-01 ENCOUNTER — OFFICE VISIT (OUTPATIENT)
Dept: GASTROENTEROLOGY | Facility: MEDICAL CENTER | Age: 39
End: 2019-03-01
Payer: COMMERCIAL

## 2019-03-01 VITALS
SYSTOLIC BLOOD PRESSURE: 112 MMHG | WEIGHT: 189 LBS | DIASTOLIC BLOOD PRESSURE: 68 MMHG | HEIGHT: 65 IN | TEMPERATURE: 98.7 F | HEART RATE: 74 BPM | BODY MASS INDEX: 31.49 KG/M2

## 2019-03-01 DIAGNOSIS — K21.9 GASTROESOPHAGEAL REFLUX DISEASE WITHOUT ESOPHAGITIS: ICD-10-CM

## 2019-03-01 DIAGNOSIS — K50.011 CROHN'S DISEASE OF SMALL INTESTINE WITH RECTAL BLEEDING (HCC): ICD-10-CM

## 2019-03-01 DIAGNOSIS — K50.011 CROHN'S DISEASE OF SMALL INTESTINE WITH RECTAL BLEEDING (HCC): Primary | ICD-10-CM

## 2019-03-01 DIAGNOSIS — K58.0 IRRITABLE BOWEL SYNDROME WITH DIARRHEA: Primary | ICD-10-CM

## 2019-03-01 PROCEDURE — 99214 OFFICE O/P EST MOD 30 MIN: CPT | Performed by: PHYSICIAN ASSISTANT

## 2019-03-04 ENCOUNTER — DOCUMENTATION (OUTPATIENT)
Dept: GASTROENTEROLOGY | Facility: MEDICAL CENTER | Age: 39
End: 2019-03-04

## 2019-03-12 ENCOUNTER — TELEPHONE (OUTPATIENT)
Dept: GASTROENTEROLOGY | Facility: MEDICAL CENTER | Age: 39
End: 2019-03-12

## 2019-03-12 NOTE — TELEPHONE ENCOUNTER
Dr Fouzia ePrea patient  Amanda Skipper Amanda Skipper Patient stated that SSRI was recommended by Dr Mario Alberto Stringer and she would like to have this prescribed   She can can be reached at [phone]

## 2019-03-12 NOTE — TELEPHONE ENCOUNTER
I called her, she has not yet been able to see the psychiatrist because she has a high co-pay, she was wondering if she could start the SSRI and which when you recommend  I let her know that I would discuss this with you and call her back tomorrow - what do you think?

## 2019-03-14 DIAGNOSIS — K50.011 CROHN'S DISEASE OF SMALL INTESTINE WITH RECTAL BLEEDING (HCC): Primary | ICD-10-CM

## 2019-03-20 ENCOUNTER — TELEPHONE (OUTPATIENT)
Dept: GASTROENTEROLOGY | Facility: AMBULARY SURGERY CENTER | Age: 39
End: 2019-03-20

## 2019-03-20 NOTE — TELEPHONE ENCOUNTER
Dr Miguel Ramirez patient      Melissa Hendricksfinkel was called into pharmacy for her but she doesn't want that again---she tried it 4 years ago and it didn't work for her

## 2019-03-20 NOTE — TELEPHONE ENCOUNTER
Nadia Rubi,  Per Therese Arcos note, she has been on humira 4 years ago  Can we try re-submitting if she is able to get us proof that she failed?   Thank you,  Hannah Hurtado

## 2019-03-22 ENCOUNTER — DOCUMENTATION (OUTPATIENT)
Dept: GASTROENTEROLOGY | Facility: MEDICAL CENTER | Age: 39
End: 2019-03-22

## 2019-03-25 ENCOUNTER — DOCUMENTATION (OUTPATIENT)
Dept: GASTROENTEROLOGY | Facility: MEDICAL CENTER | Age: 39
End: 2019-03-25

## 2019-03-25 NOTE — PROGRESS NOTES
Called [facility] to start a Prior Auth for [employer]  Patient was denied Rosalinda Burk because she most try Stelara first  I spoke to [employer] at [phone] whom gave me a PA# [id number]  All Clinicals were faxed over to [phone]  This is pending

## 2019-03-26 NOTE — TELEPHONE ENCOUNTER
Can someone please look into this? The patient has called twice about taking an SSRI and has not been able to be given an answer  I know Dr Melchor Valenzuela is on vacation but the patient wants to know what to do

## 2019-03-26 NOTE — TELEPHONE ENCOUNTER
I called the patient and informed her that her medication is currently pending  I also sent a message to the provider pool in regards to her SSRI medication

## 2019-03-26 NOTE — TELEPHONE ENCOUNTER
Princess Senior Smaller - please see below, this pt would like to start on an SSRI for IBS, she was recommended to see a psychiatrist but she has not been able to because she has a high co-pay  I have not started the patient on SSRI for IBS so I wanted to get your opinion as Dr Catie Solorzano is on vacation and this patient has been waiting for a response

## 2019-03-27 NOTE — TELEPHONE ENCOUNTER
Troy Roche Bad - I let this pt know and she is ok with waiting until you are back from vacation  Please let me or the task PA know your recs and we'd be happy to call her  Thanks!

## 2019-03-27 NOTE — TELEPHONE ENCOUNTER
I checked the chart pt also has IBD in this case it is probably better for Dr Trinna Moritz this start the a medication  I would probably start TCA but would need EKG ect   I would tell the patient that it is better for Dr Trinna Moritz to start a medication

## 2019-03-29 ENCOUNTER — DOCUMENTATION (OUTPATIENT)
Dept: GASTROENTEROLOGY | Facility: MEDICAL CENTER | Age: 39
End: 2019-03-29

## 2019-03-29 DIAGNOSIS — K50.10 CROHN'S DISEASE OF COLON WITHOUT COMPLICATION (HCC): Primary | ICD-10-CM

## 2019-03-29 NOTE — PROGRESS NOTES
Stelara was approved  Spoke to Gerson from [employer]    Auth# [id number] Good from 3/26/19-3/26/21

## 2019-04-02 DIAGNOSIS — K50.00 CROHN'S DISEASE OF SMALL INTESTINE WITHOUT COMPLICATION (HCC): Primary | ICD-10-CM

## 2019-04-10 ENCOUNTER — DOCUMENTATION (OUTPATIENT)
Dept: GASTROENTEROLOGY | Facility: MEDICAL CENTER | Age: 39
End: 2019-04-10

## 2019-04-18 ENCOUNTER — TELEPHONE (OUTPATIENT)
Dept: GASTROENTEROLOGY | Facility: MEDICAL CENTER | Age: 39
End: 2019-04-18

## 2019-04-18 ENCOUNTER — DOCUMENTATION (OUTPATIENT)
Dept: GASTROENTEROLOGY | Facility: MEDICAL CENTER | Age: 39
End: 2019-04-18

## 2019-05-01 ENCOUNTER — TELEPHONE (OUTPATIENT)
Dept: GASTROENTEROLOGY | Facility: AMBULARY SURGERY CENTER | Age: 39
End: 2019-05-01

## 2019-05-02 ENCOUNTER — TELEPHONE (OUTPATIENT)
Dept: GASTROENTEROLOGY | Facility: AMBULARY SURGERY CENTER | Age: 39
End: 2019-05-02

## 2019-05-02 DIAGNOSIS — K58.0 IRRITABLE BOWEL SYNDROME WITH DIARRHEA: ICD-10-CM

## 2019-05-02 DIAGNOSIS — K50.011 CROHN'S DISEASE OF SMALL INTESTINE WITH RECTAL BLEEDING (HCC): Primary | ICD-10-CM

## 2019-05-02 DIAGNOSIS — R10.84 GENERALIZED ABDOMINAL PAIN: ICD-10-CM

## 2019-05-02 RX ORDER — DICYCLOMINE HCL 20 MG
20 TABLET ORAL EVERY 8 HOURS
Qty: 90 TABLET | Refills: 3 | Status: SHIPPED | OUTPATIENT
Start: 2019-05-02 | End: 2019-12-09 | Stop reason: SDUPTHER

## 2019-05-10 ENCOUNTER — OFFICE VISIT (OUTPATIENT)
Dept: GASTROENTEROLOGY | Facility: MEDICAL CENTER | Age: 39
End: 2019-05-10
Payer: COMMERCIAL

## 2019-05-10 VITALS
SYSTOLIC BLOOD PRESSURE: 120 MMHG | BODY MASS INDEX: 31.99 KG/M2 | TEMPERATURE: 97.8 F | HEART RATE: 72 BPM | DIASTOLIC BLOOD PRESSURE: 60 MMHG | WEIGHT: 192 LBS | HEIGHT: 65 IN

## 2019-05-10 DIAGNOSIS — K21.9 GASTROESOPHAGEAL REFLUX DISEASE WITHOUT ESOPHAGITIS: ICD-10-CM

## 2019-05-10 DIAGNOSIS — K58.0 IRRITABLE BOWEL SYNDROME WITH DIARRHEA: ICD-10-CM

## 2019-05-10 DIAGNOSIS — R11.0 NAUSEA: ICD-10-CM

## 2019-05-10 DIAGNOSIS — K50.011 CROHN'S DISEASE OF SMALL INTESTINE WITH RECTAL BLEEDING (HCC): Primary | ICD-10-CM

## 2019-05-10 PROCEDURE — 99214 OFFICE O/P EST MOD 30 MIN: CPT | Performed by: PHYSICIAN ASSISTANT

## 2019-05-10 RX ORDER — ONDANSETRON 4 MG/1
4 TABLET, FILM COATED ORAL EVERY 8 HOURS PRN
Qty: 20 TABLET | Refills: 0 | Status: SHIPPED | OUTPATIENT
Start: 2019-05-10 | End: 2019-11-29 | Stop reason: SDUPTHER

## 2019-05-15 ENCOUNTER — TELEPHONE (OUTPATIENT)
Dept: GASTROENTEROLOGY | Facility: CLINIC | Age: 39
End: 2019-05-15

## 2019-05-15 DIAGNOSIS — K50.011 CROHN'S DISEASE OF SMALL INTESTINE WITH RECTAL BLEEDING (HCC): ICD-10-CM

## 2019-05-15 RX ORDER — SODIUM CHLORIDE 9 MG/ML
20 INJECTION, SOLUTION INTRAVENOUS ONCE
Status: CANCELLED | OUTPATIENT
Start: 2019-05-20

## 2019-05-17 ENCOUNTER — TELEPHONE (OUTPATIENT)
Dept: GASTROENTEROLOGY | Facility: MEDICAL CENTER | Age: 39
End: 2019-05-17

## 2019-05-17 RX ORDER — SODIUM CHLORIDE 9 MG/ML
20 INJECTION, SOLUTION INTRAVENOUS ONCE
Status: CANCELLED | OUTPATIENT
Start: 2019-05-23

## 2019-05-20 ENCOUNTER — HOSPITAL ENCOUNTER (OUTPATIENT)
Dept: INFUSION CENTER | Facility: CLINIC | Age: 39
Discharge: HOME | End: 2019-05-20

## 2019-05-23 ENCOUNTER — HOSPITAL ENCOUNTER (OUTPATIENT)
Dept: INFUSION CENTER | Facility: CLINIC | Age: 39
Discharge: HOME | End: 2019-05-23
Payer: COMMERCIAL

## 2019-05-23 VITALS
WEIGHT: 192.9 LBS | SYSTOLIC BLOOD PRESSURE: 104 MMHG | BODY MASS INDEX: 32.1 KG/M2 | DIASTOLIC BLOOD PRESSURE: 72 MMHG | TEMPERATURE: 98.1 F | RESPIRATION RATE: 18 BRPM | HEART RATE: 74 BPM

## 2019-05-23 DIAGNOSIS — K50.011 CROHN'S DISEASE OF SMALL INTESTINE WITH RECTAL BLEEDING (HCC): Primary | ICD-10-CM

## 2019-05-23 PROCEDURE — 96365 THER/PROPH/DIAG IV INF INIT: CPT

## 2019-05-23 RX ORDER — SODIUM CHLORIDE 9 MG/ML
20 INJECTION, SOLUTION INTRAVENOUS ONCE
Status: CANCELLED | OUTPATIENT
Start: 2019-05-26

## 2019-05-23 RX ORDER — SODIUM CHLORIDE 9 MG/ML
20 INJECTION, SOLUTION INTRAVENOUS ONCE
Status: COMPLETED | OUTPATIENT
Start: 2019-05-23 | End: 2019-05-23

## 2019-05-23 RX ADMIN — SODIUM CHLORIDE 20 ML/HR: 0.9 INJECTION, SOLUTION INTRAVENOUS at 12:54

## 2019-05-23 RX ADMIN — USTEKINUMAB 520 MG: 130 SOLUTION INTRAVENOUS at 13:21

## 2019-05-23 NOTE — PROGRESS NOTES
Pt tolerated infusion today without incident  Pt was provided with AVS   Pt has no further appts with the infusion center because she is self injecting her next doses of Stelara at home

## 2019-05-23 NOTE — PLAN OF CARE
Problem: Potential for Falls  Goal: Patient will remain free of falls  Description  INTERVENTIONS:  - Assess patient frequently for physical needs  -  Identify cognitive and physical deficits and behaviors that affect risk of falls    -  [place] fall precautions as indicated by assessment   - Educate patient/family on patient safety including physical limitations  - Instruct patient to call for assistance with activity based on assessment  - Modify environment to reduce risk of injury  - Consider OT/PT consult to assist with strengthening/mobility  Outcome: Progressing

## 2019-05-24 ENCOUNTER — TELEPHONE (OUTPATIENT)
Dept: GASTROENTEROLOGY | Facility: AMBULARY SURGERY CENTER | Age: 39
End: 2019-05-24

## 2019-07-30 ENCOUNTER — OFFICE VISIT (OUTPATIENT)
Dept: GASTROENTEROLOGY | Facility: CLINIC | Age: 39
End: 2019-07-30
Payer: COMMERCIAL

## 2019-07-30 VITALS
TEMPERATURE: 99.3 F | DIASTOLIC BLOOD PRESSURE: 87 MMHG | HEIGHT: 65 IN | HEART RATE: 80 BPM | BODY MASS INDEX: 31.65 KG/M2 | SYSTOLIC BLOOD PRESSURE: 116 MMHG | WEIGHT: 190 LBS

## 2019-07-30 DIAGNOSIS — K50.00 CROHN'S DISEASE OF SMALL INTESTINE WITHOUT COMPLICATION (HCC): Primary | ICD-10-CM

## 2019-07-30 DIAGNOSIS — K21.9 GASTROESOPHAGEAL REFLUX DISEASE WITHOUT ESOPHAGITIS: ICD-10-CM

## 2019-07-30 PROCEDURE — 99213 OFFICE O/P EST LOW 20 MIN: CPT | Performed by: INTERNAL MEDICINE

## 2019-07-30 RX ORDER — CETIRIZINE HYDROCHLORIDE 10 MG/1
10 TABLET ORAL
COMMUNITY
Start: 2018-07-20

## 2019-07-30 RX ORDER — CYCLOBENZAPRINE HCL 10 MG
10 TABLET ORAL 3 TIMES DAILY PRN
COMMUNITY
Start: 2019-07-16

## 2019-07-30 RX ORDER — HYDROCHLOROTHIAZIDE 25 MG/1
25 TABLET ORAL DAILY
COMMUNITY
Start: 2019-07-16 | End: 2019-12-30 | Stop reason: ALTCHOICE

## 2019-07-30 RX ORDER — PANTOPRAZOLE SODIUM 40 MG/1
40 TABLET, DELAYED RELEASE ORAL DAILY
COMMUNITY
Start: 2019-07-16 | End: 2019-12-30 | Stop reason: ALTCHOICE

## 2019-07-30 RX ORDER — ESOMEPRAZOLE MAGNESIUM 40 MG/1
40 CAPSULE, DELAYED RELEASE ORAL
Qty: 180 CAPSULE | Refills: 3 | Status: SHIPPED | OUTPATIENT
Start: 2019-07-30 | End: 2019-11-29 | Stop reason: SDUPTHER

## 2019-07-30 RX ORDER — AMITRIPTYLINE HYDROCHLORIDE 10 MG/1
10 TABLET, FILM COATED ORAL
COMMUNITY
Start: 2019-07-16

## 2019-07-30 RX ORDER — ALBUTEROL SULFATE 90 UG/1
2 AEROSOL, METERED RESPIRATORY (INHALATION) EVERY 4 HOURS PRN
COMMUNITY
Start: 2019-07-16 | End: 2020-07-15

## 2019-07-30 RX ORDER — DIPHENOXYLATE HYDROCHLORIDE AND ATROPINE SULFATE 2.5; .025 MG/1; MG/1
1 TABLET ORAL 4 TIMES DAILY PRN
Qty: 120 TABLET | Refills: 4 | Status: SHIPPED | OUTPATIENT
Start: 2019-07-30 | End: 2020-06-04 | Stop reason: SDUPTHER

## 2019-07-30 NOTE — PATIENT INSTRUCTIONS
3 month f/[facility] scheduled 10/30 [facility] given to patient     Gustavo Rosales

## 2019-07-30 NOTE — PROGRESS NOTES
[facility] - Outpatient Follow-up Note  Brooklyn Gracia 44 y o  female MRN: [id number]  Encounter: [id number]          ASSESSMENT AND PLAN:  Ms Oralia Hernandez was seen today for history of small intestine Crohn's disease with rectal bleeding  Diagnoses and all orders for this visit:    Crohn's disease of small intestine without complication:  Her pain is in the LLQ but her Crohn's disease is located in the terminal ileum and cecum  I will order fecal calprotectin and CRP to assess if her pain is likely associated with her Crohn's disease vs IBS  Her pain is alleviated by bowel movements which makes me think this is a component of IBS as well as her IBD  We discussed irritable bowel syndrome as a possible cause of this pain  She reports that immodium is ineffective for loose bowel movements, so I will prescribe lomotil 2 5-  025 mg per tablet prn up to 4 times per day  In the meantime continue stelara  Gastroesophageal reflux disease without esophagitis: Her epigastric burning pain is not managed with pantoprazole 40 mg bid  She tried omeprazole in the past but states it was also  ineffective  I will prescribe esomeprazole 40 mg bid  Follow-up in 3 months    ______________________________________________________________________    SUBJECTIVE:  Brooklyn Gracia is a 44 y o  female in the office today for management of Crohn's disease of the small intestine with rectal bleeding  She had small bowel resection in 2011  She has recently had her second Stelara injection  Previous trials of Humira, Pentasa, budesonide and Cimzia were ineffective  She reports some improvement in her symptoms since starting Stelara, but she continues to have chronic LLQ and left lower back pain  Her pain worsens during bowel movements and is relieved by bowel movements  She takes elavil prn for more severe pain  She has loose stools and states that immodium is ineffective      Her epigastric burning pain is not managed with pantoprazole 40 mg bid  She tried omeprazole in the past but states it was also  ineffective  EGD and biopsies of 2018 were normal      She is having a hysterectomy in several weeks due to extensive scar tissue  She does not smoke and drinks alcohol occasionally  Follow-up in 3 months  REVIEW OF SYSTEMS IS OTHERWISE NEGATIVE  Historical Information   Past Medical History:   Diagnosis Date    [address] [address] )     Crohn's disease of small intestine with rectal bleeding (HCC)     DDD (degenerative disc disease), lumbosacral 2018    Gastroesophageal reflux disease without esophagitis 2018    Pneumonia     Sickle cell trait (HCC)     Urinary tract infection     Visual impairment      Past Surgical History:   Procedure Laterality Date     SECTION      CHOLECYSTECTOMY      COLONOSCOPY      LIPOSUCTION      [place] COLONOSCOPY FLX DX W/COLLJ SPEC WHEN PFRMD N/A 2018    Procedure: EGD AND COLONOSCOPY;  Surgeon: Nichole Mcghee MD;  Location: [facility];   Service: Gastroenterology    SMALL INTESTINE SURGERY      TONSILLECTOMY      UPPER GASTROINTESTINAL ENDOSCOPY       Social History   Social History     Substance and Sexual Activity   Alcohol Use Yes    Comment: Occassionally     Social History     Substance and Sexual Activity   Drug Use No     Social History     Tobacco Use   Smoking Status Never Smoker   Smokeless Tobacco Never Used     Family History   Problem Relation Age of Onset    Hepatitis Mother     Thyroid disease Mother     Diabetes Father     Hypertension Father     Hyperlipidemia Father     Cataracts Father        Meds/Allergies       Current Outpatient Medications:     albuterol (PROVENTIL HFA) 90 mcg/act inhaler    amitriptyline (ELAVIL) 10 mg tablet    certolizumab (CIMZIA PREFILLED) 2 x 200 mg/mL    cetirizine (ZyrTEC) 10 mg tablet    cyclobenzaprine (FLEXERIL) 10 mg tablet    dicyclomine (BENTYL) 20 mg tablet   famotidine (PEPCID) 40 MG tablet    hydrochlorothiazide (HYDRODIURIL) 25 mg tablet    ondansetron (ZOFRAN) 4 mg tablet    pantoprazole (PROTONIX) 40 mg tablet    ustekinumab (STELARA) 130 MG/26ML SOLN    ustekinumab (STELARA) 90 mg/mL subcutaneous injection    diphenoxylate-atropine (LOMOTIL) 2 5-0 025 mg per tablet    esomeprazole (NexIUM) 40 MG capsule    Allergies   Allergen Reactions    Aspirin Other (See Comments)     "I was a baby, I have no idea"           Objective     Blood pressure 116/87, pulse 80, temperature 99 3 °F (37 4 °C), temperature source Tympanic, height 5' 5" (1 651 m), weight 86 2 kg (190 lb), not currently breastfeeding  Body mass index is 31 62 kg/m²  PHYSICAL EXAM:      General Appearance:   Alert, cooperative, no distress   HEENT:   Normocephalic, atraumatic, anicteric      Neck:  Supple, symmetrical, trachea midline   Lungs:   Clear to auscultation bilaterally; no rales, rhonchi or wheezing; respirations unlabored    Heart[de-identified]   Regular rate and rhythm; no murmur, rub, or gallop  Abdomen:   Soft, non-tender, non-distended; normal bowel sounds; no masses, no organomegaly    Genitalia:   Deferred    Rectal:   Deferred    Extremities:  No cyanosis, clubbing or edema    Pulses:  2+ and symmetric    Skin:  No jaundice, rashes, or lesions    Lymph nodes:  No palpable cervical lymphadenopathy        Lab Results:     Her CRP was elevated at 6 8 on 2/4/19  No visits with results within 1 Day(s) from this visit     Latest known visit with results is:   Appointment on 02/04/2019   Component Date Value    HLA B27 02/04/2019 Negative     Histone Ab 02/04/2019 0 3     WBC 02/04/2019 8 68     RBC 02/04/2019 4 05     Hemoglobin 02/04/2019 11 8     Hematocrit 02/04/2019 35 9     MCV 02/04/2019 89     MCH 02/04/2019 29 1     MCHC 02/04/2019 32 9     RDW 02/04/2019 13 2     MPV 02/04/2019 10 0     Platelets 38/60/3033 273     nRBC 02/04/2019 0     Neutrophils Relative 02/04/2019 42*    Immat GRANS % 02/04/2019 1     Lymphocytes Relative 02/04/2019 47*    Monocytes Relative 02/04/2019 8     Eosinophils Relative 02/04/2019 1     Basophils Relative 02/04/2019 1     Neutrophils Absolute 02/04/2019 3 63     Immature Grans Absolute 02/04/2019 0 04     Lymphocytes Absolute 02/04/2019 4 17     Monocytes Absolute 02/04/2019 0 67     Eosinophils Absolute 02/04/2019 0 12     Basophils Absolute 02/04/2019 0 05     Sed Rate 02/04/2019 11     CRP 02/04/2019 6 8*    Sodium 02/04/2019 139     Potassium 02/04/2019 3 9     Chloride 02/04/2019 108     CO2 02/04/2019 24     ANION GAP 02/04/2019 7     BUN 02/04/2019 10     Creatinine 02/04/2019 0 76     Glucose, Fasting 02/04/2019 75     Calcium 02/04/2019 8 2*    AST 02/04/2019 56*    ALT 02/04/2019 75     Alkaline Phosphatase 02/04/2019 130*    Total Protein 02/04/2019 7 1     Albumin 02/04/2019 3 6     Total Bilirubin 02/04/2019 0 24     eGFR 02/04/2019 100     Rheumatoid Factor 02/04/2019 Negative     Cyclic Citrullin Peptide* 02/04/2019 5     LINDY 02/04/2019 Negative     ds DNA Ab 02/04/2019 <1     SS-A (RO) Ab 02/04/2019 <0 2     SS-B (LA) Ab 02/04/2019 <0 2     C3 Complement 02/04/2019 162 0     C4, COMPLEMENT 02/04/2019 38 0     Hepatitis B Surface Ag 02/04/2019 Non-reactive     Hep A IgM 02/04/2019 Non-reactive     Hepatitis C Ab 02/04/2019 Non-reactive     Hep B C IgM 02/04/2019 Non-reactive     T4 TOTAL 02/04/2019 8 3     TSH 3RD GENERATON 02/04/2019 1 960     A/G Ratio 02/04/2019 1 33     Albumin Electrophoresis 02/04/2019 57 1     Albumin CONC 02/04/2019 4 05     Alpha 1 02/04/2019 4 3     ALPHA 1 CONC 02/04/2019 0 31     Alpha 2 02/04/2019 11 1     ALPHA 2 CONC 02/04/2019 0 79     Beta-1 02/04/2019 6 1     BETA 1 CONC 02/04/2019 0 43     Beta-2 02/04/2019 6 4     BETA 2 CONC 02/04/2019 0 45     Gamma Globulin 02/04/2019 15 0     GAMMA CONC 02/04/2019 1 07     SPEP Interpretation 02/04/2019 The serum total protein and albumin are within normal limits  No monoclonal bands noted  Reviewed by: Madi Randhawa MD **Electronic Signature**     Total Protein 02/04/2019 7 1     KITTY Chahal (SM) Ab 02/04/2019 <0 2     KITTY RNP Ab 02/04/2019 <0 2          Radiology Results:   No results found  Attestation:   By signing my name below, Ronald Nicole, attest that this documentation has been prepared under the direction and in the presence of AUGUSTINE Cespedes  Electronically Signed: Jaelyn Steve  7/30/19       I, Isabel Villarreal, personally performed the services described in this documentation  All medical record entries made by the locibpaul were at my direction and in my presence  I have reviewed the chart and discharge instructions and agree that the record reflects my personal performance and is accurate and complete   AUGUSTINE Cespedes  7/30/19

## 2019-08-26 ENCOUNTER — TELEPHONE (OUTPATIENT)
Dept: GASTROENTEROLOGY | Facility: AMBULARY SURGERY CENTER | Age: 39
End: 2019-08-26

## 2019-08-26 NOTE — TELEPHONE ENCOUNTER
I called and spoke to pharmacist Ирина to clarify that patient should be on [address] for maintenance

## 2019-08-26 NOTE — TELEPHONE ENCOUNTER
Call back # [phone] [facility] specialty pharmacy    misha patient      [facility] called to clarify dose on stelara, 90 mg or 130 mg---please advise

## 2019-10-15 ENCOUNTER — TELEPHONE (OUTPATIENT)
Dept: GASTROENTEROLOGY | Facility: AMBULARY SURGERY CENTER | Age: 39
End: 2019-10-15

## 2019-10-15 DIAGNOSIS — K59.00 CONSTIPATION, UNSPECIFIED CONSTIPATION TYPE: Primary | ICD-10-CM

## 2019-10-15 NOTE — TELEPHONE ENCOUNTER
Dr Watts Or patient Hx- Crohns, IBS, abdominal pain    Patient call for persistent symptoms since Friday  Sharp abdominal/back pain 10/10, constipation and nausea  Last BM today with straining  Denies fever  Reflux controlled -following Gerd precautions /bland diet  Taking dicyclomine at night due to drowsiness- effective for temporary relief  Recommended patient complete ordered studies  Miralax and stool softener as needed to achieve and maintain normal BMs  Continue Zofran as needed for nausea  Alarm symptoms discussed and patient is aware ER evaluation is recommended for persistent 10/10 pain  She has 10/30/19 office visit scheduled, but needs to reschedule due to conflict  *clerical please contact patient to reschedule appointment    Thank you

## 2019-10-15 NOTE — TELEPHONE ENCOUNTER
Patients GI provider:  Dr Sukhdev Olmos    Number to return call: [phone]    Reason for call: Pt calling with stomach pain and nausea---please assist    Scheduled procedure/appointment date if applicable: Apt/procedure  Ov 10-30-19

## 2019-10-17 NOTE — TELEPHONE ENCOUNTER
Patient with history of Crohn's - on Stelara, GERD, constipation  She is calling today because she is constipated and had not had a BM, which was diarrhea, since yesterday in the ER at [facility]  She takes Miralax with juice twice a day along with stool softeners  She is still constipated  She wants something that will make her have a BM  Suggested enema  They gave her something in the ER yesterday, but she does not know the name of it  She is not passing gas she states       Please advise

## 2019-10-17 NOTE — TELEPHONE ENCOUNTER
Pt is still having pain and constipation, miralax not working  , would like pain meds      Call back #  [phone]

## 2019-10-17 NOTE — TELEPHONE ENCOUNTER
Patient aware of abdominal Xray and will get it done  If no obstruction, she will drink the magnesium citrate

## 2019-10-18 ENCOUNTER — HOSPITAL ENCOUNTER (OUTPATIENT)
Dept: RADIOLOGY | Facility: HOSPITAL | Age: 39
Discharge: HOME | End: 2019-10-18
Payer: COMMERCIAL

## 2019-10-18 ENCOUNTER — TRANSCRIBE ORDERS (OUTPATIENT)
Dept: RADIOLOGY | Facility: HOSPITAL | Age: 39
End: 2019-10-18

## 2019-10-18 ENCOUNTER — APPOINTMENT (OUTPATIENT)
Dept: LAB | Facility: HOSPITAL | Age: 39
End: 2019-10-18
Attending: INTERNAL MEDICINE
Payer: COMMERCIAL

## 2019-10-18 DIAGNOSIS — K50.00 CROHN'S DISEASE OF SMALL INTESTINE WITHOUT COMPLICATION (HCC): ICD-10-CM

## 2019-10-18 DIAGNOSIS — K59.00 CONSTIPATION, UNSPECIFIED CONSTIPATION TYPE: ICD-10-CM

## 2019-10-18 LAB — CRP SERPL QL: 8.9 MG/L

## 2019-10-18 PROCEDURE — 74018 RADEX ABDOMEN 1 VIEW: CPT

## 2019-10-18 PROCEDURE — 86140 C-REACTIVE PROTEIN: CPT

## 2019-10-18 PROCEDURE — 36415 COLL VENOUS BLD VENIPUNCTURE: CPT

## 2019-10-19 ENCOUNTER — APPOINTMENT (OUTPATIENT)
Dept: LAB | Facility: MEDICAL CENTER | Age: 39
End: 2019-10-19
Attending: INTERNAL MEDICINE
Payer: COMMERCIAL

## 2019-10-19 DIAGNOSIS — K50.00 CROHN'S DISEASE OF SMALL INTESTINE WITHOUT COMPLICATION (HCC): ICD-10-CM

## 2019-10-19 DIAGNOSIS — K50.011 CROHN'S DISEASE OF SMALL INTESTINE WITH RECTAL BLEEDING (HCC): ICD-10-CM

## 2019-10-19 PROCEDURE — 87505 NFCT AGENT DETECTION GI: CPT

## 2019-10-19 PROCEDURE — 83993 ASSAY FOR CALPROTECTIN FECAL: CPT

## 2019-10-20 LAB
CAMPYLOBACTER DNA SPEC NAA+PROBE: NORMAL
SALMONELLA DNA SPEC QL NAA+PROBE: NORMAL
SHIGA TOXIN STX GENE SPEC NAA+PROBE: NORMAL
SHIGELLA DNA SPEC QL NAA+PROBE: NORMAL

## 2019-10-21 ENCOUNTER — OFFICE VISIT (OUTPATIENT)
Dept: GASTROENTEROLOGY | Facility: CLINIC | Age: 39
End: 2019-10-21
Payer: COMMERCIAL

## 2019-10-21 VITALS
HEART RATE: 78 BPM | SYSTOLIC BLOOD PRESSURE: 120 MMHG | TEMPERATURE: 98.5 F | DIASTOLIC BLOOD PRESSURE: 78 MMHG | WEIGHT: 190.4 LBS | BODY MASS INDEX: 31.72 KG/M2 | HEIGHT: 65 IN

## 2019-10-21 DIAGNOSIS — K50.011 CROHN'S DISEASE OF SMALL INTESTINE WITH RECTAL BLEEDING (HCC): Primary | ICD-10-CM

## 2019-10-21 DIAGNOSIS — R10.84 GENERALIZED ABDOMINAL PAIN: ICD-10-CM

## 2019-10-21 DIAGNOSIS — K58.0 IRRITABLE BOWEL SYNDROME WITH DIARRHEA: ICD-10-CM

## 2019-10-21 PROCEDURE — 99214 OFFICE O/P EST MOD 30 MIN: CPT | Performed by: PHYSICIAN ASSISTANT

## 2019-10-21 NOTE — PROGRESS NOTES
[facility] - Outpatient Follow-up Note  Jude Adame 44 y o  female MRN: [id number]  Encounter: [id number]          ASSESSMENT AND PLAN:      Diagnoses and all orders for this visit:    1  Crohn's disease of small intestine with rectal bleeding ([address] )  -     Ambulatory referral to Colorectal Surgery; Future  2  Irritable bowel syndrome with diarrhea and constipation  3  Generalized abdominal pain    Patient with longstanding Crohn's disease currently managed on Stelara  Will await fecal calprotectin that has been ordered  For now, continue Stelara without change  Her current symptoms may be more IBS-related  With CT and abdominal Xray showing no evidence of obstruction will increase bowel regimen to include one dose of Magnesium Citrate (start with 1/2 bottle, take second 1/2 of bottle in 30-60 minutes if no BM achieved )  Advised continued use of Miralax 1-2x daily until previous bowel pattern is achieved  Recommend daily probiotic for maintenance  - Follow up in 2-3 days by phone if symptoms are not improved  If concern for obstruction as evidenced by worsening abdominal pain or distention, absence of flatus, nausea/vomiting, etc patient agrees to return to ED   - Otherwise will follow up with Dr Eboni Givens in 3 months   ______________________________________________________________________    SUBJECTIVE:  Ino Green is a 70-year-old female with a history of Crohn's disease s/p SBR (2011) who presents for 3 month follow up  She is currently on Stelara 90mg q 6 weeks for maintenance having failed budesonide, Pentasa, Cimzia and Humira  She started Stelara in May of 2019  Her last CT enterography was in June 2018 showing terminal ileitis and a colonoscopy in December 2018 showed the same  CRP in February 2019 was elevated at 6 8 and is further elevated to 8 9 as of 10/19/19  A fecal calprotectin is pending  Stool bacterial PCR was negative on 10/19/19    She had an abdominal Xray which showed "dense material in the large bowel" but was otherwise unremarkable  She was seen in the ED at [facility] for abdominal pain and a CT scan showed "mild wall thickening in the sigmoid colon may indicate colitis "    She presents today with symptoms of LLQ pain and constipation  She was able to have a BM on Saturday after drinking pineapple and other juices at home but has not had a BM since  She feels as though her abdomen is distended  She is passing flatus  She is eating and drinking without difficulty  She states overall she feels the Julio Ode is helping with her Crohn's symptoms  Prior to this episode of constipation and abdominal pain she was having 1 formed BM daily  She denies any recent changes to her medications, other than a 4-tablet rx for oxycodone after her ER visit  She denies fevers, chills, nausea/vomiting, night sweats or weight loss  She states her weight is stable despite her efforts for weight loss  She is scheduled to see the Weight loss center at [facility] in 2 weeks  At the end of her visit, while walking down the [place] she mentioned that she has had a lump at the rectum that can itch and cause discomfort  She has been told she had hemorrhoids in the past, though rectum was documented as normal on last colonoscopy  REVIEW OF SYSTEMS IS OTHERWISE NEGATIVE        Historical Information   Past Medical History:   Diagnosis Date    Crohn disease ([address] )     Crohn's disease of small intestine with rectal bleeding ([address] )     DDD (degenerative disc disease), lumbosacral 2018    Gastroesophageal reflux disease without esophagitis 2018    Pneumonia     Sickle cell trait (HCC)     Urinary tract infection     Visual impairment      Past Surgical History:   Procedure Laterality Date     SECTION      CHOLECYSTECTOMY      COLONOSCOPY      LIPOSUCTION      IN COLONOSCOPY FLX DX W/COLLJ SPEC WHEN PFRMD N/A 2018    Procedure: EGD AND COLONOSCOPY;  Surgeon: Delroy Brown MD;  Location: [facility]; Service: Gastroenterology    SMALL INTESTINE SURGERY      TONSILLECTOMY      UPPER GASTROINTESTINAL ENDOSCOPY       Social History   Social History     Substance and Sexual Activity   Alcohol Use Yes    Comment: Occassionally     Social History     Substance and Sexual Activity   Drug Use No     Social History     Tobacco Use   Smoking Status Never Smoker   Smokeless Tobacco Never Used     Family History   Problem Relation Age of Onset    Hepatitis Mother     Thyroid disease Mother     Diabetes Father     Hypertension Father     Hyperlipidemia Father     Cataracts Father        Meds/Allergies       Current Outpatient Medications:     albuterol (PROVENTIL HFA) 90 mcg/act inhaler    amitriptyline (ELAVIL) 10 mg tablet    certolizumab (CIMZIA PREFILLED) 2 x 200 mg/mL    cetirizine (ZyrTEC) 10 mg tablet    cyclobenzaprine (FLEXERIL) 10 mg tablet    dicyclomine (BENTYL) 20 mg tablet    diphenoxylate-atropine (LOMOTIL) 2 5-0 025 mg per tablet    esomeprazole (NexIUM) 40 MG capsule    famotidine (PEPCID) 40 MG tablet    hydrochlorothiazide (HYDRODIURIL) 25 mg tablet    ondansetron (ZOFRAN) 4 mg tablet    pantoprazole (PROTONIX) 40 mg tablet    ustekinumab (STELARA) 130 MG/26ML SOLN    ustekinumab (STELARA) 90 mg/mL subcutaneous injection    Allergies   Allergen Reactions    Aspirin Other (See Comments)     "I was a baby, I have no idea"           Objective     Blood pressure 120/78, pulse 78, temperature 98 5 °F (36 9 °C), temperature source Tympanic, height 5' 5" (1 651 m), weight 86 4 kg (190 lb 6 4 oz), not currently breastfeeding  Body mass index is 31 68 kg/m²        PHYSICAL EXAM:      General Appearance:   Alert, cooperative, no distress   HEENT:   Normocephalic, atraumatic, sclera anicteric      Neck:  Supple, symmetrical, no lymphadenopathy, trachea midline   Lungs:   Clear to auscultation bilaterally; no rales, rhonchi or wheezing; respirations unlabored    Heart[de-identified]   Regular rate and rhythm; no murmur, rub, or gallop  Abdomen:   Midline lower abdominal scar, well healed  Laparoscopy scars noted in upper abdomen, well healed  Soft, generalized tenderness to light palpation without rebound or guarding, non-distended; positive bowel sounds; no masses, no organomegaly    Genitalia:   Deferred    Rectal:   Deferred    Extremities:  No cyanosis, clubbing or edema    Pulses:  2+ and symmetric    Skin:  No jaundice, rashes, or lesions          Lab Results:       Radiology Results:   Xr Abdomen 1 View Kub    Result Date: 10/18/2019  Narrative: ABDOMEN INDICATION:   K59 00: Constipation, unspecified  COMPARISON:  CT 6/14/18 VIEWS:  AP supine FINDINGS: Lower lungs are clear  Nonspecific bowel gas pattern  No acute osseous abnormality  Dense material noted within the large bowel     Impression: Unremarkable examination   Workstation performed: [id number]           Manuel Wu PA-C

## 2019-10-22 LAB — CALPROTECTIN STL-MCNT: 156 UG/G (ref 0–120)

## 2019-10-25 NOTE — TELEPHONE ENCOUNTER
Patient with history of Crohn's - on Stelara, GERD, constipation  Patient calling to report that she is not having bowel movements  She has purchased 3 bottles of mag citrate  She states she is not passing gas and she feels uncomfortable  First bottle - 1/2 Friday 6pm             1/2 Friday 11pm  Results - a lot of stool   2nd bottle - 1/2 Sat 2 pm                      1/2 Monday morning  Results - no stool   3rd bottle - Tuesday whole bottle - liquid stool    Tuesdays was her last BM  Suggested she try Miralax every day, no skipping days  She is willing to try that and keep up the regimen    Do you want her to try a bowel prep so she knows she is completely cleaned out?  She is ok with that also    Please advise

## 2019-10-25 NOTE — TELEPHONE ENCOUNTER
As per Caryn Nath needs to have an appointment with Dr Prem Adams to discuss adding another medication to her regimen for constipation  Could you please check and see if Dr Prem Adams has availability? Please let me know, so I can let [facility] know

## 2019-10-25 NOTE — TELEPHONE ENCOUNTER
Sent message to clerical to see if Dr Anu Virk has availability for an appointment  They will get back to me  Sejal Urrutai did cancel an appointment scheduled for 11/7 with Nirav Anthony  I will let her know we will hold off on the prep right now and try to get her an appt with Dr Anu Virk

## 2019-10-25 NOTE — TELEPHONE ENCOUNTER
I would hold off on prep at this time  She is to see Dr Anu Virk in the office to discuss possibly adding another med  Can you check to see if she has any availability?   Renee Nguyen

## 2019-10-28 ENCOUNTER — TELEPHONE (OUTPATIENT)
Dept: GASTROENTEROLOGY | Facility: AMBULARY SURGERY CENTER | Age: 39
End: 2019-10-28

## 2019-10-28 DIAGNOSIS — R10.84 GENERALIZED ABDOMINAL PAIN: Primary | ICD-10-CM

## 2019-10-28 RX ORDER — HYOSCYAMINE SULFATE 0.125 MG
0.12 TABLET ORAL EVERY 4 HOURS PRN
Qty: 30 TABLET | Refills: 0 | Status: SHIPPED | OUTPATIENT
Start: 2019-10-28 | End: 2020-03-16

## 2019-10-28 NOTE — TELEPHONE ENCOUNTER
Dr Prem Adams patient Hx- Crohns, IBS, generalized abdominal pain- office visit 10/21/19    Spoke to patient to follow up symptoms reported to medical assistant  Right and left sided abdominal pain 7/10  Patient states she is constipated taking Miralax BID and 1 stool softener daily  Magnesium citrate x 1 bottle yesterday was effective and pain relieved, but returned again today  Recommended patient continue to increase po fluids and fiber  Miralax BID, stool softener BID and probiotic  Gas X as needed for increased gas  Patient states she needs a pain medication for when she has pain  I explained if she maintains a regular bowel regimen she may be able to reduce/control abdominal pain  She stated I need a pain medication because I can not take it anymore        Follow up visit 11/12/19

## 2019-10-28 NOTE — TELEPHONE ENCOUNTER
I called her and we talked, she is trying the Bentyl but unfortunately this is not helping her  I asked her to stop Bentyl and start Levsin instead  I advised her to try to avoid narcotic pain medications as it will make her constipation worse

## 2019-10-28 NOTE — TELEPHONE ENCOUNTER
----- Message from Vandana Michel sent at 10/28/2019  9:00 AM EDT -----  Patient returned our call:  Patient have pain abdomen area and rectum area, patient states left side is worse than the right side and is constant, patient stated she didn't have bowel movement for a couple days, patient drank a bottle of a laxative, she then had a bowel movement all day (Sunday), patient does has follow up appt in 2 weeks, would like to be seen sooner? Please advise

## 2019-11-01 ENCOUNTER — OFFICE VISIT (OUTPATIENT)
Dept: BARIATRICS | Facility: CLINIC | Age: 39
End: 2019-11-01
Payer: COMMERCIAL

## 2019-11-01 VITALS
WEIGHT: 190.9 LBS | DIASTOLIC BLOOD PRESSURE: 80 MMHG | RESPIRATION RATE: 16 BRPM | BODY MASS INDEX: 31.81 KG/M2 | HEIGHT: 65 IN | SYSTOLIC BLOOD PRESSURE: 130 MMHG | HEART RATE: 84 BPM | TEMPERATURE: 98.3 F

## 2019-11-01 DIAGNOSIS — F45.41 STRESS HEADACHES: ICD-10-CM

## 2019-11-01 DIAGNOSIS — K50.011 CROHN'S DISEASE OF SMALL INTESTINE WITH RECTAL BLEEDING (HCC): ICD-10-CM

## 2019-11-01 DIAGNOSIS — K21.9 GASTROESOPHAGEAL REFLUX DISEASE WITHOUT ESOPHAGITIS: ICD-10-CM

## 2019-11-01 DIAGNOSIS — I10 ESSENTIAL HYPERTENSION: ICD-10-CM

## 2019-11-01 DIAGNOSIS — R63.5 ABNORMAL WEIGHT GAIN: ICD-10-CM

## 2019-11-01 DIAGNOSIS — E78.1 HYPERTRIGLYCERIDEMIA: ICD-10-CM

## 2019-11-01 DIAGNOSIS — E66.9 OBESITY, CLASS I, BMI 30-34.9: Primary | ICD-10-CM

## 2019-11-01 PROBLEM — R76.8 HEPATITIS C ANTIBODY POSITIVE IN BLOOD: Status: ACTIVE | Noted: 2018-08-06

## 2019-11-01 PROBLEM — M06.4 INFLAMMATORY POLYARTHROPATHY (HCC): Status: ACTIVE | Noted: 2019-02-04

## 2019-11-01 PROBLEM — R10.2 CHRONIC PELVIC PAIN IN FEMALE: Status: ACTIVE | Noted: 2018-05-18

## 2019-11-01 PROBLEM — G89.29 CHRONIC PELVIC PAIN IN FEMALE: Status: ACTIVE | Noted: 2018-05-18

## 2019-11-01 PROCEDURE — 99243 OFF/OP CNSLTJ NEW/EST LOW 30: CPT | Performed by: FAMILY MEDICINE

## 2019-11-01 RX ORDER — TOPIRAMATE 25 MG/1
25 TABLET ORAL
Qty: 30 TABLET | Refills: 1 | Status: SHIPPED | OUTPATIENT
Start: 2019-11-01 | End: 2019-12-09 | Stop reason: SDUPTHER

## 2019-11-01 NOTE — PROGRESS NOTES
Assessment/Plan:    Diagnoses and all orders for this visit:    Obesity, Class I, BMI 30-34 9  -     topiramate (TOPAMAX) 25 mg tablet; Take 1 tablet (25 mg total) by mouth daily at bedtime    Essential hypertension    Abnormal weight gain  -     topiramate (TOPAMAX) 25 mg tablet; Take 1 tablet (25 mg total) by mouth daily at bedtime    Hypertriglyceridemia    Gastroesophageal reflux disease without esophagitis    Crohn's disease of small intestine with rectal bleeding (HCC)    Stress headaches  -     topiramate (TOPAMAX) 25 mg tablet; Take 1 tablet (25 mg total) by mouth daily at bedtime      -Discussed options of [facility]-Intensive Lifestyle Intervention Program, Very Low Calorie Diet-VLCD and Conservative Program and the role of weight loss medications   -Initial weight loss goal of 5-10% weight loss for improved health  -Screening labs- summer 2019  -Patient is interested in pursuing Conservative Program  -Plans to start Topamax for weight loss, she does suffer from occasional headaches related to stress and it can help also  Hope that it could also help counteract the weight gaining effects of some of her current medicines (elavil, flexeril)  Reviewed the potential side effects of topiramate, which may include numbness or tingling, fatigue, upper respiratory infection symptoms, depression/anxiety, changes in taste, abdominal upset/heartburn, and trouble sleeping  AVOID Saxenda as she has history of pancreatitis  Also avoid phentermine and metformin due to GI side effects and her issues with GI sxs/Crohns/IBS w constipation and diarrhea      -start food login (will use judie) and also encourage to start sxs log in the food log   - provided with some protein shake options (OTC) that she can try to see if tolerates as an option for 1 meal replacement a day     - encourage to make one change first, so start medicine and then wait 2 weeks before making a new changes, so after 2 weeks she can start trying the PS/ meal replacements  Goals:  Food log (ie ) www myfitnesspal com,sparkpeople  com,loseit com,calorieking  com,etc  baritastic  No sugary beverages  At least 64oz of water daily  Increase physical activity by 10 minutes daily  Gradually increase physical activity to a goal of 5 days per week for 30 minutes of MODERATE intensity PLUS 2 days per week of FULL BODY resistance training    45 minute visit, >50% face-to-face time spent counseling patient on nonsurgical interventions for the treatment of excess weight  Discussed in detail nonsurgical options including intensive lifestyle intervention program, very low-calorie diet program and conservative program   Discussed the role of weight loss medications  Counseled patient on diet behavior and exercise modification for weight loss  Follow up in approximately 6 weeks with Non-Surgical Physician/Advanced Practitioner  Subjective:   Chief Complaint   Patient presents with    Consult     mwm consult        Patient ID: Pepe Ordoñez  is a 44 y o  female with excess weight/obesity here to pursue weight management      Past Medical History:   Diagnosis Date    [address] [address] [address]     Crohn's disease of small intestine with rectal bleeding ([facility] [address] )     DDD (degenerative disc disease), lumbosacral 4/13/2018    Essential hypertension 8/12/2019    Gastroesophageal reflux disease without esophagitis 4/5/2018    Pneumonia     Sickle cell trait (HCC)     Urinary tract infection     Visual impairment        HPI:  Obesity/Excess Weight:  Severity: class 1  Onset: 9 years ago after her last pregnancy    Modifiers: Diet and Exercise, Physician Supervised Weight Loss Program and Over the Counter Weight Loss Supplements, phentermine in the past  Contributing factors: Pregnancy and Medications  Associated symptoms: comorbid conditions, body image issues, decreased self esteem and decreased libido    Goals: 140lbs  Hydration:  Alcohol: occasionally, smoke-no  Exercise: treadmill 4 times a week  Sleep: 9hrs  STOP bang: 3/8    She states her issue is that she doesn't eat  Mainly due to her GI issues, she cant tolerate many foods and also when she has flare ups she is afraid of eating  She is currently following with GI and recently added Stelara  States sxs still not controlled  Foods she eats and tolerates: Seafood, fish, grilled chicken, rice, broth, pumpkin, egg whites, pineapple, green apple, watermelon, peanut/almond/cashew butter (but no nuts or seeds)  Cascade milk    Intolerance to many foods due to Crohns and IBS, with constipation and diarrhea  The following portions of the patient's history were reviewed and updated as appropriate: allergies, current medications, past family history, past medical history, past social history, past surgical history and problem list     Review of Systems   Constitutional: Negative for activity change and appetite change  HENT: Negative  Respiratory: Negative  Cardiovascular: Negative  Gastrointestinal: Positive for abdominal distention, abdominal pain and constipation  Genitourinary: Negative  Musculoskeletal: Positive for arthralgias  Skin: Negative for rash  Psychiatric/Behavioral: Negative  Objective:    /80 (BP Location: Left arm, Patient Position: Sitting, Cuff Size: Adult)   Pulse 84   Temp 98 3 °F (36 8 °C) (Tympanic)   Resp 16   Ht 5' 4 7" (1 643 m)   Wt 86 6 kg (190 lb 14 4 oz)   BMI 32 06 kg/m²     Physical Exam     Constitutional   General appearance: Abnormal   well developed and obese  Eyes No conjunctival pallor  Ears, Nose, Mouth, and Throat Oral mucosa moist    Pulmonary   Respiratory effort: No increased work of breathing or signs of respiratory distress  Auscultation of lungs: Clear to auscultation, equal breath sounds bilaterally, no wheezes, no rales, no rhonci  Cardiovascular   Auscultation of heart: Normal rate and rhythm, normal S1 and S2, without murmurs  Examination of extremities for edema and/or varicosities: Normal   no edema  Abdomen   Abdomen: Abnormal   The abdomen was obese  Bowel sounds were normal  The abdomen was soft and nontender     Musculoskeletal   Gait and station: Normal     Psychiatric   Orientation to person, place and time: Normal     Affect: appropriate

## 2019-11-12 ENCOUNTER — OFFICE VISIT (OUTPATIENT)
Dept: GASTROENTEROLOGY | Facility: CLINIC | Age: 39
End: 2019-11-12
Payer: COMMERCIAL

## 2019-11-12 VITALS
HEART RATE: 77 BPM | DIASTOLIC BLOOD PRESSURE: 83 MMHG | SYSTOLIC BLOOD PRESSURE: 136 MMHG | BODY MASS INDEX: 31.19 KG/M2 | TEMPERATURE: 98.7 F | HEIGHT: 65 IN | RESPIRATION RATE: 18 BRPM | WEIGHT: 187.2 LBS

## 2019-11-12 DIAGNOSIS — K58.1 IRRITABLE BOWEL SYNDROME WITH CONSTIPATION: ICD-10-CM

## 2019-11-12 DIAGNOSIS — K21.9 GASTROESOPHAGEAL REFLUX DISEASE WITHOUT ESOPHAGITIS: ICD-10-CM

## 2019-11-12 DIAGNOSIS — E66.9 OBESITY, CLASS I, BMI 30-34.9: ICD-10-CM

## 2019-11-12 DIAGNOSIS — K50.011 CROHN'S DISEASE OF SMALL INTESTINE WITH RECTAL BLEEDING (HCC): Primary | ICD-10-CM

## 2019-11-12 PROBLEM — E66.3 OVERWEIGHT: Status: RESOLVED | Noted: 2018-10-22 | Resolved: 2019-11-12

## 2019-11-12 PROBLEM — K50.90 CROHN'S DISEASE (HCC): Status: RESOLVED | Noted: 2017-05-07 | Resolved: 2019-11-12

## 2019-11-12 PROBLEM — R76.8 HEPATITIS C ANTIBODY POSITIVE IN BLOOD: Status: RESOLVED | Noted: 2018-08-06 | Resolved: 2019-11-12

## 2019-11-12 PROBLEM — R63.5 ABNORMAL WEIGHT GAIN: Status: RESOLVED | Noted: 2018-10-22 | Resolved: 2019-11-12

## 2019-11-12 PROCEDURE — 99214 OFFICE O/P EST MOD 30 MIN: CPT | Performed by: INTERNAL MEDICINE

## 2019-11-12 RX ORDER — DOCUSATE SODIUM 100 MG/1
100 CAPSULE, LIQUID FILLED ORAL 2 TIMES DAILY
Qty: 60 CAPSULE | Refills: 5 | Status: SHIPPED | OUTPATIENT
Start: 2019-11-12

## 2019-11-12 NOTE — PATIENT INSTRUCTIONS
Colonoscopy scheduled 12/9/19 with Dr Anum Troy at [facility]  Miralax/Dulcolax instructions given to pt during OV

## 2019-11-12 NOTE — PROGRESS NOTES
[facility] - Outpatient Follow-up Note  Milly Case 44 y o  female MRN: [id number]  Encounter: [id number]          ASSESSMENT AND PLAN:      Ms Stacy Downs was seen today for follow-up  Diagnoses and all orders for this visit:    Crohn's disease of small intestine with rectal bleeding ([address] )- Fecal calprotectin was 156 on 10/19/19 compared to in April where it was 51  CRP was also elevated  Pt reports of symptoms worsening with severe abdominal pain and constipation  Her last colonoscopy in December 2018 which showed disease in the terminal ileum as well as an erosion in the colon  Due to increase in symptoms I would like her to get an MRI enterography and also repeat her colonoscopy  I also advised pt to take colace daily as the main issue is actually straining to have a BM  GERD- Pt reports that she is on Nexium and Pepcid as needed  I discussed with pt that even surely we would like to decrease her regimen but currently that her symptoms of Crohn's her worsening will but this on the back burner for now     Immunizations- I will have pt do the following labwork as well as she will need a chest x-ray to screen for tuberculosis as she had BCG vaccine as a child   -    Hepatitis B surface antibody; Future  -     Hepatitis A antibody, total; Future    -     XR chest portable; Future    Obesity, Class I, BMI 30-34 9- Pt reports she is see a weight           ______________________________________________________________________    SUBJECTIVE:  Milly Case is a 44 y o  female with a history of Crohn's disease which was diagnosed in 2011 is here for a 3 month follow up  She is currently on Stelara 90 mg which she started in May 2019, and has tried budesonide, Pentasa, Cimizia, and Humira in the past  She saw Dr Escobar Fiore yesterday which Anoscopy was ordered  Pt reports she is not feeling well, and is having constipation and pain   She has a BM once a day with straining  She is currently taking Bentyl as needed to control severe pain  She also reports that she has GERD and is on Nexium and Pepcid as needed  2018 EGD and colonoscopy was done: which showed disease in the terminal illeium as well as arrosion  in colon  Small bowel, and colon biopsy normal      LABS:    She is up to date with FLU shot and Pneumonia   Fecal calprotectin was 156 on 10/19/19 compared to in April where it was 51  CRP was 8 9  REVIEW OF SYSTEMS IS OTHERWISE NEGATIVE  Historical Information   Past Medical History:   Diagnosis Date    [address] [address] [address]     Cro[address]'s disease of small intestine with rectal bleeding ([facility] [address]     DDD (degenerative disc disease), lumbosacral 2018    Essential hypertension 2019    Gastroesophageal reflux disease without esophagitis 2018    Pneumonia     Sickle cell trait (HCC)     Urinary tract infection     Visual impairment      Past Surgical History:   Procedure Laterality Date     SECTION      CHOLECYSTECTOMY      COLONOSCOPY      HYSTERECTOMY      LIPOSUCTION      MS COLONOSCOPY FLX DX W/COLLJ SPEC WHEN PFRMD N/A 2018    Procedure: EGD AND COLONOSCOPY;  Surgeon: Luh Guadarrama MD;  Location: [facility];   Service: Gastroenterology    SMALL INTESTINE SURGERY      TONSILLECTOMY      UPPER GASTROINTESTINAL ENDOSCOPY       Social History   Social History     Substance and Sexual Activity   Alcohol Use Yes    Frequency: Monthly or less    Comment: Occassionally     Social History     Substance and Sexual Activity   Drug Use No     Social History     Tobacco Use   Smoking Status Never Smoker   Smokeless Tobacco Never Used     Family History   Problem Relation Age of Onset    Hepatitis Mother     Thyroid disease Mother     Diabetes Father     Hypertension Father     Hyperlipidemia Father     Cataracts Father     Colon cancer Neg Hx        Meds/Allergies       Current Outpatient Medications:     albuterol (PROVENTIL HFA) 90 mcg/act inhaler    amitriptyline (ELAVIL) 10 mg tablet    certolizumab (CIMZIA PREFILLED) 2 x 200 mg/mL    cetirizine (ZyrTEC) 10 mg tablet    cyclobenzaprine (FLEXERIL) 10 mg tablet    dicyclomine (BENTYL) 20 mg tablet    diphenoxylate-atropine (LOMOTIL) 2 5-0 025 mg per tablet    famotidine (PEPCID) 40 MG tablet    hydrochlorothiazide (HYDRODIURIL) 25 mg tablet    hyoscyamine (ANASPAZ,LEVSIN) 0 125 MG tablet    ondansetron (ZOFRAN) 4 mg tablet    pantoprazole (PROTONIX) 40 mg tablet    topiramate (TOPAMAX) 25 mg tablet    ustekinumab (STELARA) 130 MG/26ML SOLN    ustekinumab (STELARA) 90 mg/mL subcutaneous injection    esomeprazole (NexIUM) 40 MG capsule    Allergies   Allergen Reactions    Aspirin Other (See Comments)     "I was a baby, I have no idea"           Objective     Blood pressure 136/83, pulse 77, temperature 98 7 °F (37 1 °C), temperature source Tympanic, resp  rate 18, height 5' 5" (1 651 m), weight 84 9 kg (187 lb 3 2 oz), not currently breastfeeding  Body mass index is 31 15 kg/m²  PHYSICAL EXAM:      General Appearance:   Alert, cooperative, no distress   HEENT:   Normocephalic, atraumatic, anicteric      Neck:  Supple, symmetrical, trachea midline   Lungs:   Clear to auscultation bilaterally; no rales, rhonchi or wheezing; respirations unlabored    Heart[de-identified]   Regular rate and rhythm; no murmur, rub, or gallop  Abdomen:   Soft, non-tender, non-distended; normal bowel sounds; no masses, no organomegaly    Genitalia:   Deferred    Rectal:   Deferred    Extremities:  No cyanosis, clubbing or edema    Pulses:  2+ and symmetric    Skin:  No jaundice, rashes, or lesions    Lymph nodes:  No palpable cervical lymphadenopathy        Lab Results:   No visits with results within 1 Day(s) from this visit     Latest known visit with results is:   Appointment on 10/19/2019   Component Date Value    Calprotectin 10/19/2019 156*    Salmonella sp PCR 10/19/2019 None Detected     Shigella sp/Enteroinvasi* 10/19/2019 None Detected     Campylobacter sp (jejuni* 10/19/2019 None Detected     Shiga toxin 1/Shiga toxi* 10/19/2019 None Detected          Radiology Results:   Xr Abdomen 1 View Kub    Result Date: 10/18/2019  Narrative: ABDOMEN INDICATION:   K59 00: Constipation, unspecified  COMPARISON:  CT 6/14/18 VIEWS:  AP supine FINDINGS: Lower lungs are clear  Nonspecific bowel gas pattern  No acute osseous abnormality  Dense material noted within the large bowel     Impression: Unremarkable examination  Workstation performed: [id number]     Attestation:   By signing my name below, Rudi Han, attest that this documentation has been prepared under the direction and in the presence of Yasmani Kim DO  Electronically Signed: Jaelyn Bryant  11/12/19      I, Yasmani Kim, personally performed the services described in this documentation  All medical record entries made by the locibpaul were at my direction and in my presence  I have reviewed the chart and discharge instructions and agree that the record reflects my personal performance and is accurate and complete   Yasmani Kim DO  11/12/19

## 2019-11-19 ENCOUNTER — TELEPHONE (OUTPATIENT)
Dept: GASTROENTEROLOGY | Facility: AMBULARY SURGERY CENTER | Age: 39
End: 2019-11-19

## 2019-11-19 NOTE — TELEPHONE ENCOUNTER
Patient states she was able to move up MRI enterography to 12/12/19  She is aware no other medications recommended to treat pain other than tylenol & motrin  Reviewed recommendation for ER evaluation if pain becomes severe/persistent

## 2019-11-19 NOTE — TELEPHONE ENCOUNTER
Patients GI provider:  Dr CACERES     Number to return call: [phone]    Reason for call: Pt called requesting advise for abd pain  Pt also stated she was in the ER yesterday for this symptom  Scheduled procedure/appointment date if applicable: Apt/procedure   n/a

## 2019-11-19 NOTE — TELEPHONE ENCOUNTER
Dr Saima Maciel patient Hx- crohns    Patient was in Er yesterday for 10/10 sharp/burning abdominal pain- worse on left side  Patient states she needs something to help her pain- currently 8/10  She usually is constipationed and started colace bid and miralax as needed since office visit 11/12/19  She has diarrhea now that she said is from the contrast for imaging  Nauseous- taking Zofran but does not help  Bentyl and Levsin ineffective for abdominal pain  Stelara every 6 weeks due 12/5/19 and Nexium as prescribed  Mri scheduled 12/27/19- patient will try to move sooner and will complete ordered blood work  Colonoscopy 12/9/19  Discussed recommendation for ER if severe pain persists  She said she went to ER and they said everything is ok  She said she needs something to treat pain  Patient is aware to decrease use of stool softener /miralax for diarrhea  Please advise

## 2019-11-19 NOTE — TELEPHONE ENCOUNTER
It is unclear if the Crohn's is causing her pain, that is what the MRE will tell us  The only other meds for pain besides Bentyl & Levsin are tylenol & motrin as anything is a narcotic & can cause constipation  I would recommend trying to push up MIAN Donato

## 2019-11-20 ENCOUNTER — APPOINTMENT (OUTPATIENT)
Dept: RADIOLOGY | Facility: MEDICAL CENTER | Age: 39
End: 2019-11-20
Attending: INTERNAL MEDICINE
Payer: COMMERCIAL

## 2019-11-20 ENCOUNTER — TELEPHONE (OUTPATIENT)
Dept: GASTROENTEROLOGY | Facility: AMBULARY SURGERY CENTER | Age: 39
End: 2019-11-20

## 2019-11-20 ENCOUNTER — APPOINTMENT (OUTPATIENT)
Dept: LAB | Facility: MEDICAL CENTER | Age: 39
End: 2019-11-20
Attending: INTERNAL MEDICINE
Payer: COMMERCIAL

## 2019-11-20 DIAGNOSIS — K50.011 CROHN'S DISEASE OF SMALL INTESTINE WITH RECTAL BLEEDING (HCC): ICD-10-CM

## 2019-11-20 LAB
HAV AB SER QL IA: REACTIVE
HBV SURFACE AB SER-ACNC: <3.1 MIU/ML

## 2019-11-20 PROCEDURE — 71046 X-RAY EXAM CHEST 2 VIEWS: CPT

## 2019-11-20 PROCEDURE — 86708 HEPATITIS A ANTIBODY: CPT

## 2019-11-20 PROCEDURE — 86706 HEP B SURFACE ANTIBODY: CPT

## 2019-11-20 NOTE — TELEPHONE ENCOUNTER
Patients GI provider:  Dr Chinchilla Liter    Number to return call: [phone]  Reason for call: [address] from [facility]  called requesting a verification on pt order  Crystal stated the procedure is not a procedure which they performed on a routine visit   Syed Wilkinson was tiger txt because [facility] is [facility]  Scheduled procedure/appointment date if applicable: Apt  99/32

## 2019-11-25 ENCOUNTER — TELEPHONE (OUTPATIENT)
Dept: GASTROENTEROLOGY | Facility: AMBULARY SURGERY CENTER | Age: 39
End: 2019-11-25

## 2019-11-25 NOTE — TELEPHONE ENCOUNTER
Call from [facility] requesting information on reactive Hepatitis A antibody, total -routine testing

## 2019-11-29 DIAGNOSIS — K21.9 GASTROESOPHAGEAL REFLUX DISEASE WITHOUT ESOPHAGITIS: ICD-10-CM

## 2019-11-29 DIAGNOSIS — R11.0 NAUSEA: ICD-10-CM

## 2019-11-29 RX ORDER — ONDANSETRON 4 MG/1
4 TABLET, FILM COATED ORAL EVERY 8 HOURS PRN
Qty: 90 TABLET | Refills: 3 | Status: SHIPPED | OUTPATIENT
Start: 2019-11-29 | End: 2020-03-16 | Stop reason: SDUPTHER

## 2019-12-03 ENCOUNTER — TRANSCRIBE ORDERS (OUTPATIENT)
Dept: GASTROENTEROLOGY | Facility: CLINIC | Age: 39
End: 2019-12-03

## 2019-12-03 RX ORDER — ESOMEPRAZOLE MAGNESIUM 40 MG/1
40 CAPSULE, DELAYED RELEASE ORAL
Qty: 180 CAPSULE | Refills: 0 | Status: SHIPPED | OUTPATIENT
Start: 2019-12-03 | End: 2020-03-22 | Stop reason: SDUPTHER

## 2019-12-08 RX ORDER — SODIUM CHLORIDE, SODIUM LACTATE, POTASSIUM CHLORIDE, CALCIUM CHLORIDE 600; 310; 30; 20 MG/100ML; MG/100ML; MG/100ML; MG/100ML
125 INJECTION, SOLUTION INTRAVENOUS CONTINUOUS
Status: CANCELLED | OUTPATIENT
Start: 2019-12-08

## 2019-12-09 ENCOUNTER — TELEPHONE (OUTPATIENT)
Dept: GASTROENTEROLOGY | Facility: CLINIC | Age: 39
End: 2019-12-09

## 2019-12-09 ENCOUNTER — HOSPITAL ENCOUNTER (OUTPATIENT)
Dept: GASTROENTEROLOGY | Facility: AMBULARY SURGERY CENTER | Age: 39
Setting detail: OUTPATIENT SURGERY
Discharge: HOME | End: 2019-12-09
Attending: INTERNAL MEDICINE

## 2019-12-09 DIAGNOSIS — E66.9 OBESITY, CLASS I, BMI 30-34.9: ICD-10-CM

## 2019-12-09 DIAGNOSIS — K58.0 IRRITABLE BOWEL SYNDROME WITH DIARRHEA: ICD-10-CM

## 2019-12-09 DIAGNOSIS — R63.5 ABNORMAL WEIGHT GAIN: ICD-10-CM

## 2019-12-09 DIAGNOSIS — F45.41 STRESS HEADACHES: ICD-10-CM

## 2019-12-09 DIAGNOSIS — R10.84 GENERALIZED ABDOMINAL PAIN: ICD-10-CM

## 2019-12-09 DIAGNOSIS — K50.011 CROHN'S DISEASE OF SMALL INTESTINE WITH RECTAL BLEEDING (HCC): ICD-10-CM

## 2019-12-09 RX ORDER — TOPIRAMATE 25 MG/1
25 TABLET ORAL
Qty: 30 TABLET | Refills: 0 | Status: SHIPPED | OUTPATIENT
Start: 2019-12-09 | End: 2019-12-30 | Stop reason: ALTCHOICE

## 2019-12-09 RX ORDER — DICYCLOMINE HCL 20 MG
20 TABLET ORAL EVERY 8 HOURS
Qty: 90 TABLET | Refills: 4 | Status: SHIPPED | OUTPATIENT
Start: 2019-12-09 | End: 2020-10-20 | Stop reason: SDUPTHER

## 2019-12-12 ENCOUNTER — HOSPITAL ENCOUNTER (OUTPATIENT)
Dept: MRI IMAGING | Facility: HOSPITAL | Age: 39
Discharge: HOME | End: 2019-12-12
Attending: INTERNAL MEDICINE
Payer: COMMERCIAL

## 2019-12-12 DIAGNOSIS — K50.011 CROHN'S DISEASE OF SMALL INTESTINE WITH RECTAL BLEEDING (HCC): ICD-10-CM

## 2019-12-12 PROCEDURE — A9585 GADOBUTROL INJECTION: HCPCS | Performed by: INTERNAL MEDICINE

## 2019-12-12 PROCEDURE — 72197 MRI PELVIS W/O & W/DYE: CPT

## 2019-12-12 PROCEDURE — 74183 MRI ABD W/O CNTR FLWD CNTR: CPT

## 2019-12-12 RX ADMIN — GADOBUTROL 8 ML: 604.72 INJECTION INTRAVENOUS at 09:21

## 2019-12-12 RX ADMIN — GLUCAGON HYDROCHLORIDE 1 MG: KIT at 09:10

## 2019-12-13 ENCOUNTER — TELEPHONE (OUTPATIENT)
Dept: GASTROENTEROLOGY | Facility: AMBULARY SURGERY CENTER | Age: 39
End: 2019-12-13

## 2019-12-13 NOTE — TELEPHONE ENCOUNTER
Patients GI provider:  Dr CACERES     Number to return call: [phone]    Reason for call: Pt called requesting to schedule her procedure  Scheduled procedure/appointment date if applicable: Apt/procedure   n/a

## 2019-12-18 ENCOUNTER — PREP FOR PROCEDURE (OUTPATIENT)
Dept: GASTROENTEROLOGY | Facility: CLINIC | Age: 39
End: 2019-12-18

## 2019-12-18 DIAGNOSIS — K50.011 CROHN'S DISEASE OF SMALL INTESTINE WITH RECTAL BLEEDING (HCC): Primary | ICD-10-CM

## 2019-12-18 NOTE — TELEPHONE ENCOUNTER
Pt is scheduled for procedure 12/30/19 with Dr Lore Bond at [facility]  Pt states she already has suprep and instructions  F/u appt scheduled with Rozina Chou on 1/27/20

## 2019-12-19 ENCOUNTER — ANESTHESIA EVENT (OUTPATIENT)
Dept: GASTROENTEROLOGY | Facility: AMBULARY SURGERY CENTER | Age: 39
End: 2019-12-19

## 2019-12-20 ENCOUNTER — TELEPHONE (OUTPATIENT)
Dept: GASTROENTEROLOGY | Facility: CLINIC | Age: 39
End: 2019-12-20

## 2019-12-20 NOTE — TELEPHONE ENCOUNTER
[address] for prior authorization for a colonoscopy  Spoke with Bon Donnelly who stated no prior Elvinita  is required     Call ref # [id number]

## 2019-12-26 ENCOUNTER — TRANSCRIBE ORDERS (OUTPATIENT)
Dept: GASTROENTEROLOGY | Facility: CLINIC | Age: 39
End: 2019-12-26

## 2019-12-30 ENCOUNTER — ANESTHESIA (OUTPATIENT)
Dept: GASTROENTEROLOGY | Facility: AMBULARY SURGERY CENTER | Age: 39
End: 2019-12-30

## 2019-12-30 ENCOUNTER — HOSPITAL ENCOUNTER (OUTPATIENT)
Dept: GASTROENTEROLOGY | Facility: AMBULARY SURGERY CENTER | Age: 39
Setting detail: OUTPATIENT SURGERY
Discharge: HOME | End: 2019-12-30
Attending: INTERNAL MEDICINE | Admitting: INTERNAL MEDICINE
Payer: COMMERCIAL

## 2019-12-30 VITALS
DIASTOLIC BLOOD PRESSURE: 91 MMHG | WEIGHT: 184 LBS | TEMPERATURE: 97.6 F | SYSTOLIC BLOOD PRESSURE: 145 MMHG | RESPIRATION RATE: 18 BRPM | OXYGEN SATURATION: 100 % | HEART RATE: 75 BPM | BODY MASS INDEX: 30.62 KG/M2

## 2019-12-30 DIAGNOSIS — K50.011 CROHN'S DISEASE OF SMALL INTESTINE WITH RECTAL BLEEDING (HCC): ICD-10-CM

## 2019-12-30 PROCEDURE — 45380 COLONOSCOPY AND BIOPSY: CPT | Performed by: INTERNAL MEDICINE

## 2019-12-30 PROCEDURE — 88305 TISSUE EXAM BY PATHOLOGIST: CPT | Performed by: PATHOLOGY

## 2019-12-30 RX ORDER — LIDOCAINE HYDROCHLORIDE 10 MG/ML
INJECTION, SOLUTION EPIDURAL; INFILTRATION; INTRACAUDAL; PERINEURAL AS NEEDED
Status: DISCONTINUED | OUTPATIENT
Start: 2019-12-30 | End: 2019-12-30 | Stop reason: SURG

## 2019-12-30 RX ORDER — SODIUM CHLORIDE, SODIUM LACTATE, POTASSIUM CHLORIDE, CALCIUM CHLORIDE 600; 310; 30; 20 MG/100ML; MG/100ML; MG/100ML; MG/100ML
125 INJECTION, SOLUTION INTRAVENOUS CONTINUOUS
Status: DISCONTINUED | OUTPATIENT
Start: 2019-12-30 | End: 2020-01-03 | Stop reason: HOSPADM

## 2019-12-30 RX ORDER — PROPOFOL 10 MG/ML
INJECTION, EMULSION INTRAVENOUS AS NEEDED
Status: DISCONTINUED | OUTPATIENT
Start: 2019-12-30 | End: 2019-12-30 | Stop reason: SURG

## 2019-12-30 RX ADMIN — PROPOFOL 30 MG: 10 INJECTION, EMULSION INTRAVENOUS at 14:01

## 2019-12-30 RX ADMIN — PROPOFOL 30 MG: 10 INJECTION, EMULSION INTRAVENOUS at 14:10

## 2019-12-30 RX ADMIN — PROPOFOL 30 MG: 10 INJECTION, EMULSION INTRAVENOUS at 14:04

## 2019-12-30 RX ADMIN — PROPOFOL 30 MG: 10 INJECTION, EMULSION INTRAVENOUS at 13:57

## 2019-12-30 RX ADMIN — PROPOFOL 30 MG: 10 INJECTION, EMULSION INTRAVENOUS at 13:59

## 2019-12-30 RX ADMIN — PROPOFOL 120 MG: 10 INJECTION, EMULSION INTRAVENOUS at 13:51

## 2019-12-30 RX ADMIN — PROPOFOL 30 MG: 10 INJECTION, EMULSION INTRAVENOUS at 13:53

## 2019-12-30 RX ADMIN — SODIUM CHLORIDE, SODIUM LACTATE, POTASSIUM CHLORIDE, AND CALCIUM CHLORIDE 125 ML/HR: .6; .31; .03; .02 INJECTION, SOLUTION INTRAVENOUS at 13:31

## 2019-12-30 RX ADMIN — LIDOCAINE HYDROCHLORIDE 50 MG: 10 INJECTION, SOLUTION EPIDURAL; INFILTRATION; INTRACAUDAL; PERINEURAL at 13:51

## 2019-12-30 RX ADMIN — PROPOFOL 30 MG: 10 INJECTION, EMULSION INTRAVENOUS at 14:07

## 2019-12-30 RX ADMIN — PROPOFOL 30 MG: 10 INJECTION, EMULSION INTRAVENOUS at 13:55

## 2019-12-30 NOTE — ANESTHESIA PREPROCEDURE EVALUATION
Review of Systems/Medical History  Patient summary reviewed  Chart reviewed  No history of anesthetic complications     Cardiovascular  Hyperlipidemia, Hypertension ,    Pulmonary  Negative pulmonary ROS        GI/Hepatic    GERD ,   Comment: Crohn's     Negative  ROS        Endo/Other    Obesity    GYN    Hysterectomy,        Hematology      Comment: Sickle cell trait  Musculoskeletal    Comment: DDD Arthritis     Neurology   Psychology           Physical Exam    Airway    Mallampati score: II  TM Distance: >3 FB  Neck ROM: full     Dental       Cardiovascular      Pulmonary      Other Findings        Anesthesia Plan  ASA Score- 2     Anesthesia Type- IV sedation with anesthesia with ASA Monitors  Additional Monitors:   Airway Plan:         Plan Factors-    Induction- intravenous  Postoperative Plan-     Informed Consent- Anesthetic plan and risks discussed with patient  I personally reviewed this patient with the CRNA  Discussed and agreed on the Anesthesia Plan with the CRNA  Dawson Aguirre

## 2019-12-30 NOTE — H&P
History and Physical -  [facility]  Marcia Montanez 44 y o  female MRN: [id number]    HPI: Marcia Montanez is a 44y o  year old female who presents for colonoscopy due to Crohn's disease  REVIEW OF SYSTEMS: Per the HPI, and otherwise unremarkable  Historical Information   Past Medical History:   Diagnosis Date    [facility] [address] [facility]     [facility]     DDD (degenerative disc disease), lumbosacral 2018    Essential hypertension 2019    Gastroesophageal reflux disease without esophagitis 2018    Pneumonia     Sickle cell trait (HCC)     Urinary tract infection     Visual impairment      Past Surgical History:   Procedure Laterality Date     SECTION      CHOLECYSTECTOMY      COLONOSCOPY      HYSTERECTOMY      LIPOSUCTION      OR COLONOSCOPY FLX DX W/COLLJ SPEC WHEN PFRMD N/A 2018    Procedure: EGD AND COLONOSCOPY;  Surgeon: Tho Emanuel MD;  Location: [facility]; Service: Gastroenterology    SMALL INTESTINE SURGERY      TONSILLECTOMY      UPPER GASTROINTESTINAL ENDOSCOPY       Social History   Social History     Substance and Sexual Activity   Alcohol Use Yes    Frequency: Monthly or less    Comment: Occassionally     Social History     Substance and Sexual Activity   Drug Use No     Social History     Tobacco Use   Smoking Status Never Smoker   Smokeless Tobacco Never Used     Family History   Problem Relation Age of Onset    Hepatitis Mother     Thyroid disease Mother     Diabetes Father     Hypertension Father     Hyperlipidemia Father     Cataracts Father     Colon cancer Neg Hx        Meds/Allergies       (Not in a hospital admission)    Allergies   Allergen Reactions    Aspirin Other (See Comments)     "I was a baby, I have no idea"       Objective     There were no vitals taken for this visit        PHYSICAL EXAM    Gen: NAD  CV: RRR  CHEST: Clear  ABD: soft, NT/ND  EXT: no edema      ASSESSMENT/PLAN:  This is a 44y o  year old female here for colonoscopy, and she is stable and optimized for her procedure

## 2019-12-30 NOTE — ANESTHESIA POSTPROCEDURE EVALUATION
Post-Op Assessment Note    CV Status:  Stable  Pain Score: 0    Pain management: adequate     Mental Status:  Alert and awake   Hydration Status:  Euvolemic   PONV Controlled:  Controlled   Airway Patency:  Patent and adequate   Post Op Vitals Reviewed: Yes      Staff: CRNA           BP   118/75   Temp     Pulse  73   Resp   14   SpO2   98%

## 2019-12-31 ENCOUNTER — OFFICE VISIT (OUTPATIENT)
Dept: BARIATRICS | Facility: CLINIC | Age: 39
End: 2019-12-31
Payer: COMMERCIAL

## 2019-12-31 VITALS
BODY MASS INDEX: 31.49 KG/M2 | SYSTOLIC BLOOD PRESSURE: 106 MMHG | TEMPERATURE: 97.6 F | DIASTOLIC BLOOD PRESSURE: 64 MMHG | HEART RATE: 79 BPM | HEIGHT: 65 IN | RESPIRATION RATE: 17 BRPM | WEIGHT: 189 LBS

## 2019-12-31 DIAGNOSIS — K21.9 GASTROESOPHAGEAL REFLUX DISEASE WITHOUT ESOPHAGITIS: ICD-10-CM

## 2019-12-31 DIAGNOSIS — K50.011 CROHN'S DISEASE OF SMALL INTESTINE WITH RECTAL BLEEDING (HCC): ICD-10-CM

## 2019-12-31 DIAGNOSIS — R51.9 CHRONIC HEADACHES: ICD-10-CM

## 2019-12-31 DIAGNOSIS — I10 ESSENTIAL HYPERTENSION: ICD-10-CM

## 2019-12-31 DIAGNOSIS — G89.29 CHRONIC HEADACHES: ICD-10-CM

## 2019-12-31 DIAGNOSIS — E66.9 OBESITY, CLASS I, BMI 30-34.9: Primary | ICD-10-CM

## 2019-12-31 PROCEDURE — 3074F SYST BP LT 130 MM HG: CPT | Performed by: FAMILY MEDICINE

## 2019-12-31 PROCEDURE — 99214 OFFICE O/P EST MOD 30 MIN: CPT | Performed by: FAMILY MEDICINE

## 2019-12-31 PROCEDURE — 3078F DIAST BP <80 MM HG: CPT | Performed by: FAMILY MEDICINE

## 2019-12-31 RX ORDER — TOPIRAMATE 50 MG/1
50 TABLET, FILM COATED ORAL EVERY 12 HOURS SCHEDULED
Qty: 60 TABLET | Refills: 1 | Status: SHIPPED | OUTPATIENT
Start: 2019-12-31 | End: 2020-03-03 | Stop reason: SDUPTHER

## 2019-12-31 NOTE — PROGRESS NOTES
Assessment/Plan:    No problem-specific Assessment & Plan notes found for this encounter  Diagnoses and all orders for this visit:    Obesity, Class I, BMI 30-34 9  -     topiramate (TOPAMAX) 50 MG tablet; Take 1 tablet (50 mg total) by mouth every 12 (twelve) hours Take 1 tab qhs for 3-4 weeks then 1 tab bid  Essential hypertension  -     topiramate (TOPAMAX) 50 MG tablet; Take 1 tablet (50 mg total) by mouth every 12 (twelve) hours Take 1 tab qhs for 3-4 weeks then 1 tab bid  Chronic headaches  -     topiramate (TOPAMAX) 50 MG tablet; Take 1 tablet (50 mg total) by mouth every 12 (twelve) hours Take 1 tab qhs for 3-4 weeks then 1 tab bid  Crohn's disease of small intestine with rectal bleeding (HCC)    Gastroesophageal reflux disease without esophagitis        -Patient is pursuing Conservative Program  -Initial weight loss goal of 5-10% weight loss for improved health  -Screening labs-UTD  -Pt had hysterectomy early 2019  neg pregnacy test 10/16/19   - on Topamax for weight loss, she does suffer from occasional headaches related to stress and it can help also  Hope that it could also help counteract the weight gaining effects of some of her current medicines (elavil, flexeril)  tolerated Topamax without side effects and note wt loss  Will increase further, titration plan explained  AVOID Saxenda as she has history of pancreatitis  Also avoid phentermine and metformin due to GI side effects and her issues with GI sxs/Crohns/IBS w constipation and diarrhea  - food login (will use judie) and also encourage to start sxs log in the food log   - Cont with 12 MR a day  She tolerated it well (lactose free)  Initial: 190 9lbs  Current: 189lbs  Change: -1 9lbs  Goal: 140lbs    Goals:  Food log (ie ) www myfitnesspal com,sparkpeople  com,[employer] com,calorieking  com,etc  baritastic  No sugary beverages  At least 64oz of water daily  Increase physical activity by 10 minutes daily   Gradually increase physical activity to a goal of 5 days per week for 30 minutes of MODERATE intensity PLUS 2 days per week of FULL BODY resistance training  Goal protein intake of 60-80 grams per day, 5-10 servings of fruits and vegetables per day and 9052-6534 calories per day     Follow up in approximately 2 months with Non-Surgical Physician/Advanced Practitioner  Subjective:   Chief Complaint   Patient presents with    Follow-up     6wk fu       Patient ID: Silas Harris  is a 44 y o  female with excess weight/obesity here to pursue weight management  Patient is pursuing Conservative Program      HPI     Pt presents for 2 mo follow up  Tolerated well Topamax 25mg qhs  Lost some weight, admits could have lost more but she was staying away with a family member for the holidays  Tolerated one of the protein shakes from the list provided and has been using one a day  Some days eating 500 cals per food log  She food log consistently at the beginning but stopped a month ago due to holidays  Average manuela ranging 500-1000 cals  The following portions of the patient's history were reviewed and updated as appropriate: allergies, current medications, past family history, past medical history, past social history, past surgical history and problem list     Review of Systems    Objective:    /64 (BP Location: Left arm, Patient Position: Sitting, Cuff Size: Large)   Pulse 79   Temp 97 6 °F (36 4 °C) (Tympanic)   Resp 17   Ht 5' 4 7" (1 643 m)   Wt 85 7 kg (189 lb)   BMI 31 74 kg/m²      Physical Exam    Constitutional   General appearance: Abnormal   well developed and obese  Eyes No conjunctival pallor  Ears, Nose, Mouth, and Throat Oral mucosa moist    Pulmonary   Respiratory effort: No increased work of breathing or signs of respiratory distress  Auscultation of lungs: Clear to auscultation, equal breath sounds bilaterally, no wheezes, no rales, no rhonci      Cardiovascular   Auscultation of heart: Normal rate and rhythm, normal S1 and S2, without murmurs  Examination of extremities for edema and/or varicosities: Normal   no edema  Abdomen   Abdomen: Abnormal   The abdomen was obese  Bowel sounds were normal  The abdomen was soft and nontender     Musculoskeletal   Gait and station: Normal     Psychiatric   Orientation to person, place and time: Normal     Affect: appropriate

## 2019-12-31 NOTE — PATIENT INSTRUCTIONS
Goals: Food log (ie ) www myfitnesspal com,sparkpeople  com,loseit com,calorieking  com,etc  baritastic  No sugary beverages  At least 64oz of water daily  Increase physical activity by 10 minutes daily   Gradually increase physical activity to a goal of 5 days per week for 30 minutes of MODERATE intensity PLUS 2 days per week of FULL BODY resistance training  Goal protein intake of 60-80 grams per day, 5-10 servings of fruits and vegetables per day and 5271-6369 calories per day

## 2020-01-13 ENCOUNTER — TELEPHONE (OUTPATIENT)
Dept: GASTROENTEROLOGY | Facility: CLINIC | Age: 40
End: 2020-01-13

## 2020-01-13 NOTE — TELEPHONE ENCOUNTER
Patients GI provider:  Dr Watts Or    Number to return call: [phone]    Reason for call: Pt calling looking for the results from her colon bx of 12/31/19   Pt is currently sched w/PA but wanted to speak to Dr Watts Or to discuss bx     Scheduled procedure/appointment date if applicable: Appt - 92/39/38

## 2020-01-27 ENCOUNTER — OFFICE VISIT (OUTPATIENT)
Dept: GASTROENTEROLOGY | Facility: CLINIC | Age: 40
End: 2020-01-27
Payer: COMMERCIAL

## 2020-01-27 VITALS
TEMPERATURE: 98.7 F | DIASTOLIC BLOOD PRESSURE: 83 MMHG | WEIGHT: 190 LBS | HEIGHT: 64 IN | HEART RATE: 83 BPM | SYSTOLIC BLOOD PRESSURE: 119 MMHG | BODY MASS INDEX: 32.44 KG/M2

## 2020-01-27 DIAGNOSIS — R10.31 RIGHT LOWER QUADRANT ABDOMINAL PAIN: ICD-10-CM

## 2020-01-27 DIAGNOSIS — K58.1 IRRITABLE BOWEL SYNDROME WITH CONSTIPATION: Primary | ICD-10-CM

## 2020-01-27 DIAGNOSIS — K50.011 CROHN'S DISEASE OF SMALL INTESTINE WITH RECTAL BLEEDING (HCC): ICD-10-CM

## 2020-01-27 PROCEDURE — 99213 OFFICE O/P EST LOW 20 MIN: CPT | Performed by: PHYSICIAN ASSISTANT

## 2020-01-27 NOTE — PROGRESS NOTES
[facility] - Outpatient Follow-up Note  Pepe Ordoñez 44 y o  female MRN: [id number]  Encounter: [id number]          ASSESSMENT AND PLAN:      Diagnoses and all orders for this visit:    1  Crohn's disease of small intestine with rectal bleeding (HCC)  -     CBC and Platelet; Future  -     Comprehensive metabolic panel; Future  -     C-reactive protein; Future  2  Irritable bowel syndrome with constipation  3  Right lower quadrant abdominal pain    Patient with Crohn's disease, well managed on Stelara - will check labs again before her follow up appointment  Continue Stelara as scheduled  She may continue antispasmodics PRN for abdominal pain and consider stool softeners or Miralax for constipation  She may also consider a trial of enteric-coated peppermint oil (IBGuard) as an alternative antispasmodic which may have less constipating effect - she was informed that this may exacerbate GERD symptoms and should discontinue if this occurs  She will continue her daily probiotic  Also discussed dietary sources of probiotics and she will review these with her nutritionist     Will plan follow up appointment in 6 months, unless needed sooner  ______________________________________________________________________    SUBJECTIVE:  Scar is a 51-year-old female with Crohn's disease of the colon and terminal ileum, managed with Stelara, who presents for routine follow up  She was last seen for colonoscopy in December 2019 which showed normal colon and TI - bx were negative for active disease  She has been using Stelara every 6 weeks and tolerates this well  She is due for her next dose of Stelara in 1 week  She has BMs generally once daily without melena or hematochezia  She can be prone to constipation, particularly when using medications like Bentyl for abdominal pain  She states she has RLQ abdominal pain, described as achy, mot days of the week    It improves with use of a heating pad or antispasmodics, but she has been trying to avoid these due to side effects of drowsiness and constipation  She denies fevers, chills or unintentional weight loss  She has been seeing a nutritionist and is working on modifying her diet for weight loss  She denies skin rashes, eye pain, or joint pain  REVIEW OF SYSTEMS IS OTHERWISE NEGATIVE  Historical Information   Past Medical History:   Diagnosis Date    [facility] [address] [facility]     [facility]     DDD (degenerative disc disease), lumbosacral 2018    Essential hypertension 2019    Gastroesophageal reflux disease without esophagitis 2018    Pneumonia     Sickle cell trait (HCC)     Urinary tract infection     Visual impairment      Past Surgical History:   Procedure Laterality Date     SECTION      CHOLECYSTECTOMY      COLONOSCOPY      HYSTERECTOMY      LIPOSUCTION      [place] COLONOSCOPY FLX DX W/COLLJ SPEC WHEN PFRMD N/A 2018    Procedure: EGD AND COLONOSCOPY;  Surgeon: Makenzie Phillips MD;  Location: [facility];   Service: Gastroenterology    SMALL INTESTINE SURGERY      TONSILLECTOMY      UPPER GASTROINTESTINAL ENDOSCOPY       Social History   Social History     Substance and Sexual Activity   Alcohol Use Yes    Frequency: Monthly or less    Comment: Occassionally     Social History     Substance and Sexual Activity   Drug Use No     Social History     Tobacco Use   Smoking Status Never Smoker   Smokeless Tobacco Never Used     Family History   Problem Relation Age of Onset    Hepatitis Mother     Thyroid disease Mother     Diabetes Father     Hypertension Father     Hyperlipidemia Father     Cataracts Father     Colon cancer Neg Hx        Meds/Allergies       Current Outpatient Medications:     amitriptyline (ELAVIL) 10 mg tablet    cetirizine (ZyrTEC) 10 mg tablet    cyclobenzaprine (FLEXERIL) 10 mg tablet    dicyclomine (BENTYL) 20 mg tablet   diphenoxylate-atropine (LOMOTIL) 2 5-0 025 mg per tablet    docusate sodium (COLACE) 100 mg capsule    esomeprazole (NexIUM) 40 MG capsule    famotidine (PEPCID) 40 MG tablet    hyoscyamine (ANASPAZ,LEVSIN) 0 125 MG tablet    ondansetron (ZOFRAN) 4 mg tablet    topiramate (TOPAMAX) 50 MG tablet    ustekinumab (STELARA) 90 mg/mL subcutaneous injection    albuterol (PROVENTIL HFA) 90 mcg/act inhaler    ustekinumab (STELARA) 130 MG/26ML SOLN    Allergies   Allergen Reactions    Aspirin Other (See Comments)     "I was a baby, I have no idea"       Objective     Blood pressure 119/83, pulse 83, temperature 98 7 °F (37 1 °C), temperature source Tympanic, height 5' 4" (1 626 m), weight 86 2 kg (190 lb), not currently breastfeeding  Body mass index is 32 61 kg/m²  PHYSICAL EXAM:      General Appearance:   Alert, cooperative, no distress   HEENT:   Normocephalic, atraumatic, sclera anicteric      Neck:  Supple, symmetrical, no lymphadenopathy, trachea midline   Lungs:   Clear to auscultation bilaterally; no rales, rhonchi or wheezing; respirations unlabored    Heart[de-identified]   Regular rate and rhythm; no murmur, rub, or gallop     Abdomen:   Soft, +RLQ tenderness, otherwise non-tender without rebound or guarding, non-distended; positive bowel sounds; no masses, no organomegaly    Genitalia:   Deferred    Rectal:   Deferred    Extremities:  No cyanosis, clubbing or edema    Pulses:  2+ and symmetric    Skin:  No jaundice, rashes, or lesions            Arturo Velasquez PA-C

## 2020-03-03 ENCOUNTER — OFFICE VISIT (OUTPATIENT)
Dept: BARIATRICS | Facility: CLINIC | Age: 40
End: 2020-03-03
Payer: COMMERCIAL

## 2020-03-03 VITALS
SYSTOLIC BLOOD PRESSURE: 120 MMHG | DIASTOLIC BLOOD PRESSURE: 76 MMHG | HEART RATE: 85 BPM | TEMPERATURE: 98.8 F | BODY MASS INDEX: 32.2 KG/M2 | RESPIRATION RATE: 18 BRPM | WEIGHT: 188.6 LBS | HEIGHT: 64 IN

## 2020-03-03 DIAGNOSIS — G89.29 CHRONIC HEADACHES: ICD-10-CM

## 2020-03-03 DIAGNOSIS — K21.9 GASTROESOPHAGEAL REFLUX DISEASE WITHOUT ESOPHAGITIS: ICD-10-CM

## 2020-03-03 DIAGNOSIS — R51.9 CHRONIC HEADACHES: ICD-10-CM

## 2020-03-03 DIAGNOSIS — I10 ESSENTIAL HYPERTENSION: ICD-10-CM

## 2020-03-03 DIAGNOSIS — E78.1 HYPERTRIGLYCERIDEMIA: ICD-10-CM

## 2020-03-03 DIAGNOSIS — K50.011 CROHN'S DISEASE OF SMALL INTESTINE WITH RECTAL BLEEDING (HCC): ICD-10-CM

## 2020-03-03 DIAGNOSIS — M06.4 INFLAMMATORY POLYARTHROPATHY (HCC): ICD-10-CM

## 2020-03-03 DIAGNOSIS — E66.9 OBESITY, CLASS I, BMI 30-34.9: Primary | ICD-10-CM

## 2020-03-03 PROCEDURE — 99214 OFFICE O/P EST MOD 30 MIN: CPT | Performed by: FAMILY MEDICINE

## 2020-03-03 PROCEDURE — 3008F BODY MASS INDEX DOCD: CPT | Performed by: FAMILY MEDICINE

## 2020-03-03 PROCEDURE — 1036F TOBACCO NON-USER: CPT | Performed by: FAMILY MEDICINE

## 2020-03-03 PROCEDURE — 3074F SYST BP LT 130 MM HG: CPT | Performed by: FAMILY MEDICINE

## 2020-03-03 PROCEDURE — 3078F DIAST BP <80 MM HG: CPT | Performed by: FAMILY MEDICINE

## 2020-03-03 RX ORDER — TOPIRAMATE 50 MG/1
TABLET, FILM COATED ORAL
Qty: 90 TABLET | Refills: 1 | Status: SHIPPED | OUTPATIENT
Start: 2020-03-03 | End: 2020-03-09 | Stop reason: SDUPTHER

## 2020-03-09 DIAGNOSIS — R51.9 CHRONIC HEADACHES: ICD-10-CM

## 2020-03-09 DIAGNOSIS — E66.9 OBESITY, CLASS I, BMI 30-34.9: ICD-10-CM

## 2020-03-09 DIAGNOSIS — I10 ESSENTIAL HYPERTENSION: ICD-10-CM

## 2020-03-09 DIAGNOSIS — G89.29 CHRONIC HEADACHES: ICD-10-CM

## 2020-03-09 RX ORDER — TOPIRAMATE 50 MG/1
TABLET, FILM COATED ORAL
Qty: 90 TABLET | Refills: 1 | Status: SHIPPED | OUTPATIENT
Start: 2020-03-09 | End: 2020-03-19 | Stop reason: SDUPTHER

## 2020-03-11 ENCOUNTER — TELEPHONE (OUTPATIENT)
Dept: GASTROENTEROLOGY | Facility: AMBULARY SURGERY CENTER | Age: 40
End: 2020-03-11

## 2020-03-13 ENCOUNTER — TELEPHONE (OUTPATIENT)
Dept: GASTROENTEROLOGY | Facility: CLINIC | Age: 40
End: 2020-03-13

## 2020-03-16 ENCOUNTER — OFFICE VISIT (OUTPATIENT)
Dept: GASTROENTEROLOGY | Facility: CLINIC | Age: 40
End: 2020-03-16
Payer: COMMERCIAL

## 2020-03-16 VITALS
SYSTOLIC BLOOD PRESSURE: 140 MMHG | TEMPERATURE: 98.7 F | DIASTOLIC BLOOD PRESSURE: 96 MMHG | HEART RATE: 79 BPM | BODY MASS INDEX: 31.76 KG/M2 | HEIGHT: 64 IN | WEIGHT: 186 LBS

## 2020-03-16 DIAGNOSIS — R10.84 GENERALIZED ABDOMINAL PAIN: ICD-10-CM

## 2020-03-16 DIAGNOSIS — K59.00 CONSTIPATION, UNSPECIFIED CONSTIPATION TYPE: ICD-10-CM

## 2020-03-16 DIAGNOSIS — R11.0 NAUSEA: ICD-10-CM

## 2020-03-16 DIAGNOSIS — R10.84 GENERALIZED ABDOMINAL PAIN: Primary | ICD-10-CM

## 2020-03-16 DIAGNOSIS — K58.1 IRRITABLE BOWEL SYNDROME WITH CONSTIPATION: ICD-10-CM

## 2020-03-16 DIAGNOSIS — K50.011 CROHN'S DISEASE OF SMALL INTESTINE WITH RECTAL BLEEDING (HCC): ICD-10-CM

## 2020-03-16 PROCEDURE — 1036F TOBACCO NON-USER: CPT | Performed by: PHYSICIAN ASSISTANT

## 2020-03-16 PROCEDURE — 3080F DIAST BP >= 90 MM HG: CPT | Performed by: PHYSICIAN ASSISTANT

## 2020-03-16 PROCEDURE — 3008F BODY MASS INDEX DOCD: CPT | Performed by: PHYSICIAN ASSISTANT

## 2020-03-16 PROCEDURE — 99214 OFFICE O/P EST MOD 30 MIN: CPT | Performed by: PHYSICIAN ASSISTANT

## 2020-03-16 PROCEDURE — 3077F SYST BP >= 140 MM HG: CPT | Performed by: PHYSICIAN ASSISTANT

## 2020-03-16 RX ORDER — POLYETHYLENE GLYCOL 3350 17 G/17G
17 POWDER, FOR SOLUTION ORAL DAILY
Qty: 14 EACH | Refills: 2 | Status: SHIPPED | OUTPATIENT
Start: 2020-03-16 | End: 2021-11-19 | Stop reason: CLARIF

## 2020-03-16 NOTE — PROGRESS NOTES
[facility] - Outpatient Follow-up Note  Joyce Vann 44 y o  female MRN: [id number]  Encounter: [id number]          ASSESSMENT AND PLAN:      Diagnoses and all orders for this visit:    1  Generalized abdominal pain  -     hyoscyamine (LEVSIN/SL) 0 125 mg SL tablet; Take 1 tablet (0 125 mg total) by mouth every 4 (four) hours as needed for cramping  2  Constipation, unspecified constipation type  -     polyethylene glycol (MIRALAX) 17 g packet; Take 17 g by mouth daily  3  Irritable bowel syndrome with constipation  4  Crohn's disease of small intestine with rectal bleeding [facility])    Patient with Crohn's disease - normal bx on recent colonoscopy and no evidence of active disease on recent MRE  Continue Stelara as directed  Discussed importance of social distancing, hand hygiene and avoiding touching face during the COVID-19 outbreak as she is immunosuppressed due to her Geanie Heckle - she expresses understanding of this  Discussed her abdominal pain is likely secondary to constipation and IBS, with possible contribution from scarring as seen on MRE  Will focus on improved bowel regularity by adding Miralax 1-2x daily with goal of 1-2 soft BMs daily  She may titrate dose down if needed for looser stools  Will try hyoscyamine for continued abdominal discomfort, though advised patient this can contribute to constipation  Reviewed recommendations from last visit for trial of peppermint oil if she does not tolerate hyoscyamine  Advised patient to go to ER only if severe abdominal pain, pain accompanied by nausea and vomiting, fevers/chills, lower GI bleeding, but otherwise try to avoid ER to avoid exposure to COVID-19   ______________________________________________________________________    SUBJECTIVE:  Wade Painting is a 31-year-old female with Crohn's disease, IBS and constipation who presents for evaluation of abdominal pain  She was seen 1 month ago for the same    She did not have labs done as recommended, but was in the ER 1 week ago with CBC and CMP largely unremarkable with regard to her abdominal pain  A CRP was not ordered in the ER  She had a colonoscopy in 2019 which showed normal TI and normal ileal and colonic biopsies  She also had an MRE showing no active disease, but possible scarring/stricturing in the RLQ  She states she has some degree of pain daily  She is unable to say if meals worsen her pain reliably  Sometimes it is worse when she hasn't eaten, or had only liquids  It is always worsened when she has an energy drink or eats chocolate, so she tries to limit these to once each week  She finds moving around to be palliative or at least distracts her from her symptoms - she often cleans her house when her pain is the worst   She has a BM every 3-4 days using stool softeners daily  She has to strain for a BM  No melena or hematochezia  No weight changes  No fevers, chills, cough, chest pain, SOB, sore throat, or body aches  REVIEW OF SYSTEMS IS OTHERWISE NEGATIVE  Historical Information   Past Medical History:   Diagnosis Date    [facility] [address] [facility]     [facility] [address] [facility]     DDD (degenerative disc disease), lumbosacral 2018    Essential hypertension 2019    Gastroesophageal reflux disease without esophagitis 2018    Pneumonia     Sickle cell trait (HCC)     Urinary tract infection     Visual impairment      Past Surgical History:   Procedure Laterality Date     SECTION      CHOLECYSTECTOMY      COLONOSCOPY      HYSTERECTOMY      LIPOSUCTION      [place] COLONOSCOPY FLX DX W/COLLJ SPEC WHEN PFRMD N/A 2018    Procedure: EGD AND COLONOSCOPY;  Surgeon: Corine Singh MD;  Location: [facility];   Service: Gastroenterology    SMALL INTESTINE SURGERY      TONSILLECTOMY      UPPER GASTROINTESTINAL ENDOSCOPY       Social History   Social History     Substance and Sexual Activity   Alcohol Use Yes    Frequency: Monthly or less    Comment: Occassionally     Social History     Substance and Sexual Activity   Drug Use No     Social History     Tobacco Use   Smoking Status Never Smoker   Smokeless Tobacco Never Used     Family History   Problem Relation Age of Onset    Hepatitis Mother     Thyroid disease Mother     Diabetes Father     Hypertension Father     Hyperlipidemia Father     Cataracts Father     Colon cancer Neg Hx        Meds/Allergies       Current Outpatient Medications:     albuterol (PROVENTIL HFA) 90 mcg/act inhaler    cetirizine (ZyrTEC) 10 mg tablet    dicyclomine (BENTYL) 20 mg tablet    diphenoxylate-atropine (LOMOTIL) 2 5-0 025 mg per tablet    docusate sodium (COLACE) 100 mg capsule    famotidine (PEPCID) 40 MG tablet    ondansetron (ZOFRAN) 4 mg tablet    topiramate (TOPAMAX) 50 MG tablet    ustekinumab (STELARA) 130 MG/26ML SOLN    ustekinumab (STELARA) 90 mg/mL subcutaneous injection    amitriptyline (ELAVIL) 10 mg tablet    cyclobenzaprine (FLEXERIL) 10 mg tablet    esomeprazole (NexIUM) 40 MG capsule    hyoscyamine (LEVSIN/SL) 0 125 mg SL tablet    polyethylene glycol (MIRALAX) 17 g packet    Allergies   Allergen Reactions    Aspirin Other (See Comments)     "I was a baby, I have no idea"           Objective     Blood pressure 140/96, pulse 79, temperature 98 7 °F (37 1 °C), temperature source Tympanic, height 5' 4" (1 626 m), weight 84 4 kg (186 lb), not currently breastfeeding  Body mass index is 31 93 kg/m²  PHYSICAL EXAM:      General Appearance:   Alert, cooperative, no distress   HEENT:      Neck:     Lungs:      Heart[de-identified]      Abdomen:   Soft, non-tender without rebound or guarding, non-distended; positive bowel sounds; no masses, no organomegaly    Genitalia:   Deferred    Rectal:   Deferred    Extremities:     Pulses:     Skin:         Full examination was not completed due to COVID-19 outbreak      Miki Schirmer, PA-C

## 2020-03-17 RX ORDER — HYOSCYAMINE SULFATE 0.125 MG
0.12 TABLET ORAL EVERY 4 HOURS PRN
Qty: 30 TABLET | Refills: 0 | OUTPATIENT
Start: 2020-03-17

## 2020-03-17 RX ORDER — ONDANSETRON 4 MG/1
4 TABLET, FILM COATED ORAL EVERY 8 HOURS PRN
Qty: 90 TABLET | Refills: 0 | Status: SHIPPED | OUTPATIENT
Start: 2020-03-17 | End: 2021-01-27 | Stop reason: SDUPTHER

## 2020-03-19 ENCOUNTER — TELEPHONE (OUTPATIENT)
Dept: BARIATRICS | Facility: CLINIC | Age: 40
End: 2020-03-19

## 2020-03-19 DIAGNOSIS — G89.29 CHRONIC HEADACHES: ICD-10-CM

## 2020-03-19 DIAGNOSIS — I10 ESSENTIAL HYPERTENSION: ICD-10-CM

## 2020-03-19 DIAGNOSIS — R51.9 CHRONIC HEADACHES: ICD-10-CM

## 2020-03-19 DIAGNOSIS — E66.9 OBESITY, CLASS I, BMI 30-34.9: ICD-10-CM

## 2020-03-19 RX ORDER — TOPIRAMATE 50 MG/1
TABLET, FILM COATED ORAL
Qty: 270 TABLET | Refills: 0 | Status: SHIPPED | OUTPATIENT
Start: 2020-03-19 | End: 2020-03-19 | Stop reason: SDUPTHER

## 2020-03-19 RX ORDER — TOPIRAMATE 50 MG/1
TABLET, FILM COATED ORAL
Qty: 270 TABLET | Refills: 0 | Status: SHIPPED | OUTPATIENT
Start: 2020-03-19 | End: 2020-06-09 | Stop reason: SDUPTHER

## 2020-03-19 NOTE — TELEPHONE ENCOUNTER
Pt called and stated she has been out of Topamax for 2 days  Requesting med be sent to [facility] (previously sent to [facility])  Rx sent

## 2020-03-22 DIAGNOSIS — K21.9 GASTROESOPHAGEAL REFLUX DISEASE WITHOUT ESOPHAGITIS: ICD-10-CM

## 2020-03-23 RX ORDER — ESOMEPRAZOLE MAGNESIUM 40 MG/1
40 CAPSULE, DELAYED RELEASE ORAL
Qty: 180 CAPSULE | Refills: 1 | Status: SHIPPED | OUTPATIENT
Start: 2020-03-23 | End: 2020-03-25 | Stop reason: SDUPTHER

## 2020-03-25 DIAGNOSIS — K21.9 GASTROESOPHAGEAL REFLUX DISEASE WITHOUT ESOPHAGITIS: ICD-10-CM

## 2020-03-25 RX ORDER — ESOMEPRAZOLE MAGNESIUM 40 MG/1
40 CAPSULE, DELAYED RELEASE ORAL
Qty: 180 CAPSULE | Refills: 0 | Status: CANCELLED | OUTPATIENT
Start: 2020-03-25 | End: 2020-06-23

## 2020-03-25 RX ORDER — ESOMEPRAZOLE MAGNESIUM 40 MG/1
40 CAPSULE, DELAYED RELEASE ORAL
Qty: 180 CAPSULE | Refills: 1 | Status: SHIPPED | OUTPATIENT
Start: 2020-03-25 | End: 2020-03-25 | Stop reason: SDUPTHER

## 2020-03-25 RX ORDER — ESOMEPRAZOLE MAGNESIUM 40 MG/1
40 CAPSULE, DELAYED RELEASE ORAL
Qty: 90 CAPSULE | Refills: 0 | Status: SHIPPED | OUTPATIENT
Start: 2020-03-25 | End: 2020-06-04 | Stop reason: SDUPTHER

## 2020-03-25 NOTE — TELEPHONE ENCOUNTER
----- Message from Tabitha Anthony MA sent at 3/25/2020 11:37 AM EDT -----  Regarding: FW: Prescription Question  Contact: [phone]      ----- Message -----  From: Donavan Cee  Sent: 3/25/2020  11:35 AM EDT  To: , #  Subject: Prescription Question                            I need refil for nexium i dont have any more   Can you please sent me one   Thanks  I hope you are ok and save   Take care you and your family

## 2020-03-25 NOTE — TELEPHONE ENCOUNTER
Last office visit 3/16/20    11/12/19 office visit -Nexium and Pepcid as needed  Nexium script entered

## 2020-04-30 DIAGNOSIS — K50.10 CROHN'S DISEASE OF COLON WITHOUT COMPLICATION (HCC): ICD-10-CM

## 2020-05-05 ENCOUNTER — TELEMEDICINE (OUTPATIENT)
Dept: BARIATRICS | Facility: CLINIC | Age: 40
End: 2020-05-05
Payer: COMMERCIAL

## 2020-05-05 VITALS — BODY MASS INDEX: 30.22 KG/M2 | HEIGHT: 64 IN | WEIGHT: 177 LBS

## 2020-05-05 DIAGNOSIS — I10 ESSENTIAL HYPERTENSION: ICD-10-CM

## 2020-05-05 DIAGNOSIS — K50.011 CROHN'S DISEASE OF SMALL INTESTINE WITH RECTAL BLEEDING (HCC): ICD-10-CM

## 2020-05-05 DIAGNOSIS — E66.9 OBESITY, CLASS I, BMI 30-34.9: Primary | ICD-10-CM

## 2020-05-05 DIAGNOSIS — K21.9 GASTROESOPHAGEAL REFLUX DISEASE WITHOUT ESOPHAGITIS: ICD-10-CM

## 2020-05-05 PROCEDURE — 99214 OFFICE O/P EST MOD 30 MIN: CPT | Performed by: FAMILY MEDICINE

## 2020-06-04 DIAGNOSIS — K21.9 GASTROESOPHAGEAL REFLUX DISEASE WITHOUT ESOPHAGITIS: ICD-10-CM

## 2020-06-04 DIAGNOSIS — R10.13 EPIGASTRIC PAIN: ICD-10-CM

## 2020-06-04 DIAGNOSIS — K50.00 CROHN'S DISEASE OF SMALL INTESTINE WITHOUT COMPLICATION (HCC): ICD-10-CM

## 2020-06-04 DIAGNOSIS — R10.84 GENERALIZED ABDOMINAL PAIN: ICD-10-CM

## 2020-06-05 RX ORDER — FAMOTIDINE 40 MG/1
40 TABLET, FILM COATED ORAL 2 TIMES DAILY PRN
Qty: 60 TABLET | Refills: 1 | Status: SHIPPED | OUTPATIENT
Start: 2020-06-05 | End: 2021-12-09 | Stop reason: SDUPTHER

## 2020-06-05 RX ORDER — DIPHENOXYLATE HYDROCHLORIDE AND ATROPINE SULFATE 2.5; .025 MG/1; MG/1
1 TABLET ORAL 4 TIMES DAILY PRN
Qty: 120 TABLET | Refills: 1 | Status: SHIPPED | OUTPATIENT
Start: 2020-06-05

## 2020-06-05 RX ORDER — ESOMEPRAZOLE MAGNESIUM 40 MG/1
40 CAPSULE, DELAYED RELEASE ORAL
Qty: 180 CAPSULE | Refills: 1 | Status: SHIPPED | OUTPATIENT
Start: 2020-06-05 | End: 2020-08-26 | Stop reason: SDUPTHER

## 2020-06-09 DIAGNOSIS — I10 ESSENTIAL HYPERTENSION: ICD-10-CM

## 2020-06-09 DIAGNOSIS — E66.9 OBESITY, CLASS I, BMI 30-34.9: ICD-10-CM

## 2020-06-09 DIAGNOSIS — R51.9 CHRONIC HEADACHES: ICD-10-CM

## 2020-06-09 DIAGNOSIS — G89.29 CHRONIC HEADACHES: ICD-10-CM

## 2020-06-09 RX ORDER — TOPIRAMATE 50 MG/1
TABLET, FILM COATED ORAL
Qty: 270 TABLET | Refills: 1 | Status: SHIPPED | OUTPATIENT
Start: 2020-06-09 | End: 2020-06-17 | Stop reason: SDUPTHER

## 2020-06-09 RX ORDER — TOPIRAMATE 50 MG/1
TABLET, FILM COATED ORAL
Qty: 270 TABLET | Refills: 0 | OUTPATIENT
Start: 2020-06-09

## 2020-06-17 DIAGNOSIS — E66.9 OBESITY, CLASS I, BMI 30-34.9: ICD-10-CM

## 2020-06-17 DIAGNOSIS — G89.29 CHRONIC HEADACHES: ICD-10-CM

## 2020-06-17 DIAGNOSIS — I10 ESSENTIAL HYPERTENSION: ICD-10-CM

## 2020-06-17 DIAGNOSIS — R51.9 CHRONIC HEADACHES: ICD-10-CM

## 2020-06-17 RX ORDER — TOPIRAMATE 50 MG/1
TABLET, FILM COATED ORAL
Qty: 270 TABLET | Refills: 1 | Status: SHIPPED | OUTPATIENT
Start: 2020-06-17 | End: 2020-08-06 | Stop reason: SDUPTHER

## 2020-06-22 ENCOUNTER — TELEPHONE (OUTPATIENT)
Dept: GASTROENTEROLOGY | Facility: AMBULARY SURGERY CENTER | Age: 40
End: 2020-06-22

## 2020-06-23 ENCOUNTER — TELEPHONE (OUTPATIENT)
Dept: BARIATRICS | Facility: CLINIC | Age: 40
End: 2020-06-23

## 2020-06-30 ENCOUNTER — TRANSCRIBE ORDERS (OUTPATIENT)
Dept: LAB | Facility: HOSPITAL | Age: 40
End: 2020-06-30

## 2020-06-30 ENCOUNTER — APPOINTMENT (OUTPATIENT)
Dept: LAB | Facility: HOSPITAL | Age: 40
End: 2020-06-30
Payer: COMMERCIAL

## 2020-06-30 ENCOUNTER — PREP FOR PROCEDURE (OUTPATIENT)
Dept: GASTROENTEROLOGY | Facility: CLINIC | Age: 40
End: 2020-06-30

## 2020-06-30 ENCOUNTER — OFFICE VISIT (OUTPATIENT)
Dept: GASTROENTEROLOGY | Facility: CLINIC | Age: 40
End: 2020-06-30
Payer: COMMERCIAL

## 2020-06-30 VITALS
BODY MASS INDEX: 30.73 KG/M2 | HEART RATE: 80 BPM | WEIGHT: 180 LBS | DIASTOLIC BLOOD PRESSURE: 78 MMHG | HEIGHT: 64 IN | TEMPERATURE: 97.2 F | SYSTOLIC BLOOD PRESSURE: 112 MMHG

## 2020-06-30 DIAGNOSIS — K50.011 CROHN'S DISEASE OF SMALL INTESTINE WITH RECTAL BLEEDING (HCC): Primary | ICD-10-CM

## 2020-06-30 DIAGNOSIS — K58.1 IRRITABLE BOWEL SYNDROME WITH CONSTIPATION: ICD-10-CM

## 2020-06-30 DIAGNOSIS — K21.9 GASTROESOPHAGEAL REFLUX DISEASE WITHOUT ESOPHAGITIS: ICD-10-CM

## 2020-06-30 DIAGNOSIS — Z20.822 ENCOUNTER FOR LABORATORY TESTING FOR COVID-19 VIRUS: ICD-10-CM

## 2020-06-30 DIAGNOSIS — K50.011 CROHN'S DISEASE OF SMALL INTESTINE WITH RECTAL BLEEDING (HCC): ICD-10-CM

## 2020-06-30 LAB
ALBUMIN SERPL BCP-MCNC: 4 G/DL (ref 3.5–5)
ALP SERPL-CCNC: 153 U/L (ref 46–116)
ALT SERPL W P-5'-P-CCNC: 68 U/L (ref 12–78)
ANION GAP SERPL CALCULATED.3IONS-SCNC: 9 MMOL/L (ref 4–13)
AST SERPL W P-5'-P-CCNC: 37 U/L (ref 5–45)
BILIRUB SERPL-MCNC: 0.49 MG/DL (ref 0.2–1)
BUN SERPL-MCNC: 10 MG/DL (ref 5–25)
CALCIUM SERPL-MCNC: 9 MG/DL (ref 8.3–10.1)
CHLORIDE SERPL-SCNC: 110 MMOL/L (ref 100–108)
CO2 SERPL-SCNC: 19 MMOL/L (ref 21–32)
CREAT SERPL-MCNC: 0.93 MG/DL (ref 0.6–1.3)
CRP SERPL QL: 10.6 MG/L
ERYTHROCYTE [DISTWIDTH] IN BLOOD BY AUTOMATED COUNT: 13.2 % (ref 11.6–15.1)
GFR SERPL CREATININE-BSD FRML MDRD: 77 ML/MIN/1.73SQ M
GLUCOSE P FAST SERPL-MCNC: 88 MG/DL (ref 65–99)
HCT VFR BLD AUTO: 38.4 % (ref 34.8–46.1)
HGB BLD-MCNC: 12.7 G/DL (ref 11.5–15.4)
MCH RBC QN AUTO: 29.8 PG (ref 26.8–34.3)
MCHC RBC AUTO-ENTMCNC: 33.1 G/DL (ref 31.4–37.4)
MCV RBC AUTO: 90 FL (ref 82–98)
PLATELET # BLD AUTO: 243 THOUSANDS/UL (ref 149–390)
PMV BLD AUTO: 10.2 FL (ref 8.9–12.7)
POTASSIUM SERPL-SCNC: 4 MMOL/L (ref 3.5–5.3)
PROT SERPL-MCNC: 8.3 G/DL (ref 6.4–8.2)
RBC # BLD AUTO: 4.26 MILLION/UL (ref 3.81–5.12)
SODIUM SERPL-SCNC: 138 MMOL/L (ref 136–145)
TSH SERPL DL<=0.05 MIU/L-ACNC: 1.38 UIU/ML (ref 0.36–3.74)
WBC # BLD AUTO: 7.12 THOUSAND/UL (ref 4.31–10.16)

## 2020-06-30 PROCEDURE — 99214 OFFICE O/P EST MOD 30 MIN: CPT | Performed by: INTERNAL MEDICINE

## 2020-06-30 PROCEDURE — 85027 COMPLETE CBC AUTOMATED: CPT

## 2020-06-30 PROCEDURE — 84443 ASSAY THYROID STIM HORMONE: CPT

## 2020-06-30 PROCEDURE — 82397 CHEMILUMINESCENT ASSAY: CPT

## 2020-06-30 PROCEDURE — 80299 QUANTITATIVE ASSAY DRUG: CPT

## 2020-06-30 PROCEDURE — 36415 COLL VENOUS BLD VENIPUNCTURE: CPT

## 2020-06-30 PROCEDURE — 80053 COMPREHEN METABOLIC PANEL: CPT

## 2020-06-30 PROCEDURE — 86140 C-REACTIVE PROTEIN: CPT

## 2020-07-10 ENCOUNTER — TELEPHONE (OUTPATIENT)
Dept: GASTROENTEROLOGY | Facility: CLINIC | Age: 40
End: 2020-07-10

## 2020-07-10 NOTE — TELEPHONE ENCOUNTER
Called [facility] cira to obtain information regarding patients Prior Auth for Nexium 40 mg  I spoke to [facility] and she stated that claim is still pending and will send us a fax or approval/denial soon

## 2020-07-16 LAB — MISCELLANEOUS LAB TEST RESULT: NORMAL

## 2020-07-30 ENCOUNTER — ANESTHESIA EVENT (OUTPATIENT)
Dept: GASTROENTEROLOGY | Facility: AMBULARY SURGERY CENTER | Age: 40
End: 2020-07-30

## 2020-08-05 ENCOUNTER — TELEPHONE (OUTPATIENT)
Dept: GASTROENTEROLOGY | Facility: CLINIC | Age: 40
End: 2020-08-05

## 2020-08-05 DIAGNOSIS — Z20.822 ENCOUNTER FOR LABORATORY TESTING FOR COVID-19 VIRUS: ICD-10-CM

## 2020-08-05 PROCEDURE — U0003 INFECTIOUS AGENT DETECTION BY NUCLEIC ACID (DNA OR RNA); SEVERE ACUTE RESPIRATORY SYNDROME CORONAVIRUS 2 (SARS-COV-2) (CORONAVIRUS DISEASE [COVID-19]), AMPLIFIED PROBE TECHNIQUE, MAKING USE OF HIGH THROUGHPUT TECHNOLOGIES AS DESCRIBED BY CMS-2020-01-R: HCPCS

## 2020-08-05 NOTE — TELEPHONE ENCOUNTER
[address] [facility] for [facility]'s [facility]  No auth required    Call ref #[id number]

## 2020-08-06 ENCOUNTER — OFFICE VISIT (OUTPATIENT)
Dept: BARIATRICS | Facility: CLINIC | Age: 40
End: 2020-08-06
Payer: COMMERCIAL

## 2020-08-06 VITALS
WEIGHT: 178.6 LBS | BODY MASS INDEX: 30.49 KG/M2 | SYSTOLIC BLOOD PRESSURE: 112 MMHG | HEART RATE: 67 BPM | DIASTOLIC BLOOD PRESSURE: 76 MMHG | HEIGHT: 64 IN | TEMPERATURE: 97.8 F

## 2020-08-06 DIAGNOSIS — K50.011 CROHN'S DISEASE OF SMALL INTESTINE WITH RECTAL BLEEDING (HCC): ICD-10-CM

## 2020-08-06 DIAGNOSIS — K21.9 GASTROESOPHAGEAL REFLUX DISEASE WITHOUT ESOPHAGITIS: ICD-10-CM

## 2020-08-06 DIAGNOSIS — I10 ESSENTIAL HYPERTENSION: ICD-10-CM

## 2020-08-06 DIAGNOSIS — G89.29 CHRONIC HEADACHES: ICD-10-CM

## 2020-08-06 DIAGNOSIS — R51.9 CHRONIC HEADACHES: ICD-10-CM

## 2020-08-06 DIAGNOSIS — M06.4 INFLAMMATORY POLYARTHROPATHY (HCC): ICD-10-CM

## 2020-08-06 DIAGNOSIS — E66.9 OBESITY, CLASS I, BMI 30-34.9: Primary | ICD-10-CM

## 2020-08-06 LAB — SARS-COV-2 RNA SPEC QL NAA+PROBE: NOT DETECTED

## 2020-08-06 PROCEDURE — 99214 OFFICE O/P EST MOD 30 MIN: CPT | Performed by: FAMILY MEDICINE

## 2020-08-06 RX ORDER — FLUTICASONE PROPIONATE 50 MCG
2 SPRAY, SUSPENSION (ML) NASAL DAILY
COMMUNITY
Start: 2020-06-09 | End: 2021-10-21

## 2020-08-06 RX ORDER — LEVOCETIRIZINE DIHYDROCHLORIDE 5 MG/1
5 TABLET, FILM COATED ORAL EVERY EVENING
COMMUNITY
Start: 2020-05-14

## 2020-08-06 RX ORDER — ACETAMINOPHEN 500 MG
TABLET ORAL
COMMUNITY
Start: 2020-06-24

## 2020-08-06 RX ORDER — TOPIRAMATE 100 MG/1
100 TABLET, FILM COATED ORAL 2 TIMES DAILY
Qty: 60 TABLET | Refills: 2 | Status: SHIPPED | OUTPATIENT
Start: 2020-08-06 | End: 2020-10-20 | Stop reason: SDUPTHER

## 2020-08-06 NOTE — PROGRESS NOTES
Assessment/Plan:    No problem-specific Assessment & Plan notes found for this encounter  Diagnoses and all orders for this visit:    Obesity, Class I, BMI 30-34 9  -     topiramate (TOPAMAX) 100 mg tablet; Take 1 tablet (100 mg total) by mouth 2 (two) times a day    Inflammatory polyarthropathy (HCC)    Crohn's disease of small intestine with rectal bleeding (HCC)    Gastroesophageal reflux disease without esophagitis    Chronic headaches  -     topiramate (TOPAMAX) 100 mg tablet; Take 1 tablet (100 mg total) by mouth 2 (two) times a day    Essential hypertension  -     topiramate (TOPAMAX) 100 mg tablet; Take 1 tablet (100 mg total) by mouth 2 (two) times a day    Other orders  -     fluticasone (FLONASE) 50 mcg/act nasal spray; 2 sprays into each nostril daily  -     levocetirizine (XYZAL) 5 MG tablet; Take 5 mg by mouth every evening  -     acetaminophen (TYLENOL) 500 mg tablet; TAKE TWO TABLETS BY MOUTH EVERY 8 HOURS AS NEEDED FOR MILD PAIN        -Patient is pursuing Conservative Program  -Initial weight loss goal of 5-10% weight loss for improved health  -Screening labs-UTD  -Pt had hysterectomy early 2019  neg pregnacy test 10/16/19   -on Topamax 100mg BID      AVOID Saxenda as she has history of pancreatitis  Also avoid phentermine and metformin due to GI side effects and her issues with GI sxs/Crohns/IBS w constipation and diarrhea       - Discussed to get back in routine now that she is feeling better  - Increase physical activity     Initial: 190 9lbs  Current: 178 6lbs   Change: -12lbs  Goal: 140lbs     Goals:  Food log (ie ) www myfitnesspal com,sparkpeople  com,loseit com,calorieking  com,etc  baritastic  No sugary beverages  At least 64oz of water daily  Increase physical activity by 10 minutes daily   Gradually increase physical activity to a goal of 5 days per week for 30 minutes of MODERATE intensity PLUS 2 days per week of FULL BODY resistance training  Goal protein intake of 60-80 grams per day, 5-10 servings of fruits and vegetables per day and 2388-3547 calories per day  Follow up in approximately 3 months with Non-Surgical Physician/Advanced Practitioner  Subjective:   Chief Complaint   Patient presents with    Follow-up     mwm 3 month follow up       Patient ID: Lane Dsouza  is a 36 y o  female with excess weight/obesity here to pursue weight management  Patient is pursuing Conservative Program      HPI     3 month follow up  On topamax 50mg qAM and 100mg qpm     Nutrition: not food login, not tracking  Exercise: unable due to right knee pain/injury    The following portions of the patient's history were reviewed and updated as appropriate: allergies, current medications, past family history, past medical history, past social history, past surgical history and problem list     Review of Systems    Objective:    /76 (BP Location: Left arm, Patient Position: Sitting, Cuff Size: Large)   Pulse 67   Temp 97 8 °F (36 6 °C)   Ht 5' 4" (1 626 m)   Wt 81 kg (178 lb 9 6 oz)   BMI 30 66 kg/m²      Physical Exam    Constitutional   General appearance: Abnormal   well developed and obese  Eyes No conjunctival pallor  Ears, Nose, Mouth, and Throat Oral mucosa moist    Pulmonary   Respiratory effort: No increased work of breathing or signs of respiratory distress  Auscultation of lungs: Clear to auscultation, equal breath sounds bilaterally, no wheezes, no rales, no rhonci  Cardiovascular   Auscultation of heart: Normal rate and rhythm, normal S1 and S2, without murmurs  Examination of extremities for edema and/or varicosities: Normal   no edema  Abdomen   Abdomen: Abnormal   The abdomen was obese  Bowel sounds were normal  The abdomen was soft and nontender     Musculoskeletal   Gait and station: Normal     Psychiatric   Orientation to person, place and time: Normal     Affect: appropriate

## 2020-08-12 RX ORDER — SODIUM CHLORIDE, SODIUM LACTATE, POTASSIUM CHLORIDE, CALCIUM CHLORIDE 600; 310; 30; 20 MG/100ML; MG/100ML; MG/100ML; MG/100ML
100 INJECTION, SOLUTION INTRAVENOUS CONTINUOUS
Status: CANCELLED | OUTPATIENT
Start: 2020-08-12

## 2020-08-13 ENCOUNTER — ANESTHESIA (OUTPATIENT)
Dept: GASTROENTEROLOGY | Facility: AMBULARY SURGERY CENTER | Age: 40
End: 2020-08-13

## 2020-08-13 ENCOUNTER — HOSPITAL ENCOUNTER (OUTPATIENT)
Dept: GASTROENTEROLOGY | Facility: AMBULARY SURGERY CENTER | Age: 40
Setting detail: OUTPATIENT SURGERY
Discharge: HOME | End: 2020-08-13
Attending: INTERNAL MEDICINE | Admitting: INTERNAL MEDICINE
Payer: COMMERCIAL

## 2020-08-13 VITALS
SYSTOLIC BLOOD PRESSURE: 117 MMHG | WEIGHT: 177 LBS | DIASTOLIC BLOOD PRESSURE: 65 MMHG | HEIGHT: 65 IN | RESPIRATION RATE: 18 BRPM | OXYGEN SATURATION: 99 % | HEART RATE: 78 BPM | BODY MASS INDEX: 29.49 KG/M2 | TEMPERATURE: 97.7 F

## 2020-08-13 DIAGNOSIS — K50.011 CROHN'S DISEASE OF SMALL INTESTINE WITH RECTAL BLEEDING (HCC): ICD-10-CM

## 2020-08-13 PROBLEM — E66.9 OBESITY, CLASS I, BMI 30-34.9: Status: RESOLVED | Noted: 2019-11-01 | Resolved: 2020-08-13

## 2020-08-13 PROBLEM — Z90.710 HISTORY OF HYSTERECTOMY: Status: ACTIVE | Noted: 2020-08-13

## 2020-08-13 PROBLEM — J45.909 ASTHMA: Status: ACTIVE | Noted: 2020-08-13

## 2020-08-13 PROBLEM — K85.90 PANCREATITIS: Status: RESOLVED | Noted: 2017-05-07 | Resolved: 2020-08-13

## 2020-08-13 PROCEDURE — 45380 COLONOSCOPY AND BIOPSY: CPT | Performed by: INTERNAL MEDICINE

## 2020-08-13 PROCEDURE — 88305 TISSUE EXAM BY PATHOLOGIST: CPT | Performed by: PATHOLOGY

## 2020-08-13 RX ORDER — SODIUM CHLORIDE, SODIUM LACTATE, POTASSIUM CHLORIDE, CALCIUM CHLORIDE 600; 310; 30; 20 MG/100ML; MG/100ML; MG/100ML; MG/100ML
INJECTION, SOLUTION INTRAVENOUS CONTINUOUS PRN
Status: DISCONTINUED | OUTPATIENT
Start: 2020-08-13 | End: 2020-08-13

## 2020-08-13 RX ORDER — LIDOCAINE HYDROCHLORIDE 10 MG/ML
INJECTION, SOLUTION EPIDURAL; INFILTRATION; INTRACAUDAL; PERINEURAL AS NEEDED
Status: DISCONTINUED | OUTPATIENT
Start: 2020-08-13 | End: 2020-08-13

## 2020-08-13 RX ORDER — PROPOFOL 10 MG/ML
INJECTION, EMULSION INTRAVENOUS AS NEEDED
Status: DISCONTINUED | OUTPATIENT
Start: 2020-08-13 | End: 2020-08-13

## 2020-08-13 RX ADMIN — PROPOFOL 100 MG: 10 INJECTION, EMULSION INTRAVENOUS at 11:06

## 2020-08-13 RX ADMIN — PROPOFOL 40 MG: 10 INJECTION, EMULSION INTRAVENOUS at 11:13

## 2020-08-13 RX ADMIN — SODIUM CHLORIDE, SODIUM LACTATE, POTASSIUM CHLORIDE, AND CALCIUM CHLORIDE: .6; .31; .03; .02 INJECTION, SOLUTION INTRAVENOUS at 10:45

## 2020-08-13 RX ADMIN — PROPOFOL 20 MG: 10 INJECTION, EMULSION INTRAVENOUS at 11:15

## 2020-08-13 RX ADMIN — PROPOFOL 40 MG: 10 INJECTION, EMULSION INTRAVENOUS at 11:11

## 2020-08-13 RX ADMIN — PROPOFOL 20 MG: 10 INJECTION, EMULSION INTRAVENOUS at 11:08

## 2020-08-13 RX ADMIN — LIDOCAINE HYDROCHLORIDE 50 MG: 10 INJECTION, SOLUTION EPIDURAL; INFILTRATION; INTRACAUDAL; PERINEURAL at 11:06

## 2020-08-13 NOTE — H&P
History and Physical - [facility]  Kym Meza 36 y o  female MRN: [id number]                  HPI: Kym Meza is a 36y o  year old female who presents for colonoscopy due to Crohn's disease  REVIEW OF SYSTEMS: Per the HPI, and otherwise unremarkable  Historical Information   Past Medical History:   Diagnosis Date    [facility]     [facility] ([facility] )     DDD (degenerative disc disease), lumbosacral 2018    Essential hypertension 2019    Gastroesophageal reflux disease without esophagitis 2018    Pneumonia     Sickle cell trait (HCC)     Urinary tract infection     Visual impairment      Past Surgical History:   Procedure Laterality Date     SECTION      CHOLECYSTECTOMY      COLONOSCOPY      HYSTERECTOMY      LIPOSUCTION      PA COLONOSCOPY FLX DX W/COLLJ SPEC WHEN PFRMD N/A 2018    Procedure: EGD AND COLONOSCOPY;  Surgeon: Giselle Burns MD;  Location[facility];   Service: Gastroenterology    SMALL INTESTINE SURGERY      TONSILLECTOMY      UPPER GASTROINTESTINAL ENDOSCOPY       Social History   Social History     Substance and Sexual Activity   Alcohol Use Yes    Frequency: Monthly or less    Comment: Occassionally     Social History     Substance and Sexual Activity   Drug Use No     Social History     Tobacco Use   Smoking Status Never Smoker   Smokeless Tobacco Never Used     Family History   Problem Relation Age of Onset    Hepatitis Mother     Thyroid disease Mother     Diabetes Father     Hypertension Father     Hyperlipidemia Father     Cataracts Father     Colon cancer Neg Hx        Meds/Allergies     (Not in a hospital admission)      Allergies   Allergen Reactions    Aspirin Other (See Comments)     "I was a baby, I have no idea"    Morphine Itching       Objective     /74   Pulse 73 Comment: nsr  Temp (!) 97 2 °F (36 2 °C) (Temporal)   Resp 16   Ht 5' 5" (1 651 m)   Wt 80 3 kg (177 lb)   LMP 04/28/2018   SpO2 98%   BMI 29 45 kg/m²       PHYSICAL EXAM    Gen: NAD  CV: RRR  CHEST: Clear  ABD: soft, NT/ND  EXT: no edema      ASSESSMENT/PLAN:  This is a 36y o  year old female here for colonoscopy, and she is stable and optimized for her procedure

## 2020-08-13 NOTE — ANESTHESIA POSTPROCEDURE EVALUATION
Post-Op Assessment Note    CV Status:  Stable  Pain Score: 0       Mental Status:  Alert and awake   Hydration Status:  Stable   PONV Controlled:  None   Airway Patency:  Patent      Post Op Vitals Reviewed: Yes      Staff: CRNA         No complications documented      /79 (08/13/20 1125)    Temp 97 7 °F (36 5 °C) (08/13/20 1125)    Pulse 76 (08/13/20 1125)   Resp 18 (08/13/20 1125)    SpO2 99 % (08/13/20 1125)

## 2020-08-13 NOTE — ANESTHESIA PREPROCEDURE EVALUATION
Procedure:  COLONOSCOPY    Relevant Problems   CARDIO   (+) Essential hypertension   (+) Hypertriglyceridemia      GI/HEPATIC   (+) Gastroesophageal reflux disease without esophagitis      GYN   (+) History of hysterectomy      MUSCULOSKELETAL   (+) DDD (degenerative disc disease), lumbosacral   (+) Inflammatory polyarthropathy (HCC)      NEURO/PSYCH   (+) Chronic headaches   (+) Chronic pelvic pain in female      PULMONARY   (+) Asthma (allergy type)   (-) Sleep apnea   (-) Smoking   (-) URI (upper respiratory infection)      Other   (+) Crohn's disease of small intestine with rectal bleeding (HCC)      Physical Exam    Airway    Mallampati score: II  TM Distance: >3 FB  Neck ROM: full     Dental   No notable dental hx     Cardiovascular      Pulmonary      Other Findings       Lab Results   Component Value Date    WBC 7 12 06/30/2020    HGB 12 7 06/30/2020     06/30/2020     Lab Results   Component Value Date    SODIUM 138 06/30/2020    K 4 0 06/30/2020    BUN 10 06/30/2020    CREATININE 0 93 06/30/2020    EGFR 77 06/30/2020     Lab Results   Component Value Date    HGBA1C 5 4 07/16/2019     Anesthesia Plan  ASA Score- 2     Anesthesia Type- IV sedation with anesthesia with ASA Monitors  Additional Monitors:   Airway Plan:           Plan Factors-Exercise tolerance (METS): >4 METS  Chart reviewed  Existing labs reviewed  Patient summary reviewed  Patient is not a current smoker  Induction- intravenous  Postoperative Plan-     Informed Consent- Anesthetic plan and risks discussed with patient  I personally reviewed this patient with the CRNA  Discussed and agreed on the Anesthesia Plan with the CRNA  Julio Cesar Kaur

## 2020-08-20 ENCOUNTER — TELEPHONE (OUTPATIENT)
Dept: GASTROENTEROLOGY | Facility: CLINIC | Age: 40
End: 2020-08-20

## 2020-08-20 NOTE — TELEPHONE ENCOUNTER
[employer]        Result Notes       Ernesto Brambila   8/20/2020 11:16 AM       Called patient, lmom for patient to call back for results      Bentley Lennon DO   8/18/2020  7:31 AM       Please let patient know that colon biopsies were all normal, this is good news, should continue current crohn's treatment

## 2020-08-26 ENCOUNTER — TELEPHONE (OUTPATIENT)
Dept: GASTROENTEROLOGY | Facility: AMBULARY SURGERY CENTER | Age: 40
End: 2020-08-26

## 2020-08-26 DIAGNOSIS — K21.9 GASTROESOPHAGEAL REFLUX DISEASE WITHOUT ESOPHAGITIS: ICD-10-CM

## 2020-08-26 DIAGNOSIS — R10.84 GENERALIZED ABDOMINAL PAIN: ICD-10-CM

## 2020-08-26 RX ORDER — ESOMEPRAZOLE MAGNESIUM 40 MG/1
40 CAPSULE, DELAYED RELEASE ORAL
Qty: 180 CAPSULE | Refills: 0 | Status: SHIPPED | OUTPATIENT
Start: 2020-08-26 | End: 2021-01-15 | Stop reason: SDUPTHER

## 2020-08-26 NOTE — TELEPHONE ENCOUNTER
Patient of Dr Geovany Suarez, last seen 6/30 for office visit, colonoscopy on 8/13    History of Crohn's, IBS, GERD    Called patient, she states she hasn't had a bowel movement for 2 days and it is causing severe 10/10 RLQ pain that radiates to her back  Patient states [place] and she forgot her colace at home which precipitated this  I advised if her pain is that severe she needs to go to the ED  She says she cannot go to the ED because she is out of state and with her kids with no one to watch them  She states she has been drinking a lot of water and prune juice to have a bowel movement but nothing has worked so far  I advised she could try miralax over the counter  Patient does have bentyl but does not want to take it because she's worried it will make her constipation worse   Please advise

## 2020-08-26 NOTE — TELEPHONE ENCOUNTER
Please have patient take a dose of MiraLax twice a day until she has a bowel movement  She can also try cutting the Bentyl in half for pain relief with hopefully less side effect of constipation  Reiterate to patient that if her pain does worsen she will have to go to the emergency department with her children  Thank you!

## 2020-08-26 NOTE — TELEPHONE ENCOUNTER
Patients GI provider:  Dr Lali Duarte    Number to return call: ( [phone]    Reason for call: Pt calling with stomach ain and constipation    Scheduled procedure/appointment date if applicable: Apt/procedure  na

## 2020-09-01 ENCOUNTER — TELEPHONE (OUTPATIENT)
Dept: GASTROENTEROLOGY | Facility: CLINIC | Age: 40
End: 2020-09-01

## 2020-10-20 DIAGNOSIS — K50.011 CROHN'S DISEASE OF SMALL INTESTINE WITH RECTAL BLEEDING (HCC): ICD-10-CM

## 2020-10-20 DIAGNOSIS — R10.84 GENERALIZED ABDOMINAL PAIN: ICD-10-CM

## 2020-10-20 DIAGNOSIS — R51.9 CHRONIC HEADACHES: ICD-10-CM

## 2020-10-20 DIAGNOSIS — I10 ESSENTIAL HYPERTENSION: ICD-10-CM

## 2020-10-20 DIAGNOSIS — G89.29 CHRONIC HEADACHES: ICD-10-CM

## 2020-10-20 DIAGNOSIS — K58.0 IRRITABLE BOWEL SYNDROME WITH DIARRHEA: ICD-10-CM

## 2020-10-20 DIAGNOSIS — E66.9 OBESITY, CLASS I, BMI 30-34.9: ICD-10-CM

## 2020-10-21 RX ORDER — DICYCLOMINE HCL 20 MG
20 TABLET ORAL EVERY 8 HOURS
Qty: 90 TABLET | Refills: 0 | Status: SHIPPED | OUTPATIENT
Start: 2020-10-21 | End: 2020-11-29 | Stop reason: SDUPTHER

## 2020-10-21 RX ORDER — TOPIRAMATE 100 MG/1
100 TABLET, FILM COATED ORAL 2 TIMES DAILY
Qty: 60 TABLET | Refills: 0 | Status: SHIPPED | OUTPATIENT
Start: 2020-10-21 | End: 2020-11-29 | Stop reason: SDUPTHER

## 2020-10-22 ENCOUNTER — TELEPHONE (OUTPATIENT)
Dept: GASTROENTEROLOGY | Facility: AMBULARY SURGERY CENTER | Age: 40
End: 2020-10-22

## 2020-10-22 ENCOUNTER — TELEPHONE (OUTPATIENT)
Dept: OTHER | Facility: OTHER | Age: 40
End: 2020-10-22

## 2020-10-22 DIAGNOSIS — K50.011 CROHN'S DISEASE OF SMALL INTESTINE WITH RECTAL BLEEDING (HCC): Primary | ICD-10-CM

## 2020-10-23 ENCOUNTER — TELEPHONE (OUTPATIENT)
Dept: GASTROENTEROLOGY | Facility: CLINIC | Age: 40
End: 2020-10-23

## 2020-10-26 DIAGNOSIS — K50.011 CROHN'S DISEASE OF SMALL INTESTINE WITH RECTAL BLEEDING (HCC): Primary | ICD-10-CM

## 2020-11-04 ENCOUNTER — TELEPHONE (OUTPATIENT)
Dept: GASTROENTEROLOGY | Facility: CLINIC | Age: 40
End: 2020-11-04

## 2020-11-09 ENCOUNTER — LAB (OUTPATIENT)
Dept: LAB | Facility: MEDICAL CENTER | Age: 40
End: 2020-11-09
Payer: COMMERCIAL

## 2020-11-09 DIAGNOSIS — K50.011 CROHN'S DISEASE OF SMALL INTESTINE WITH RECTAL BLEEDING (HCC): ICD-10-CM

## 2020-11-10 ENCOUNTER — APPOINTMENT (OUTPATIENT)
Dept: LAB | Facility: MEDICAL CENTER | Age: 40
End: 2020-11-10
Payer: COMMERCIAL

## 2020-11-10 PROCEDURE — 36415 COLL VENOUS BLD VENIPUNCTURE: CPT

## 2020-11-10 PROCEDURE — 80299 QUANTITATIVE ASSAY DRUG: CPT

## 2020-11-10 PROCEDURE — 83993 ASSAY FOR CALPROTECTIN FECAL: CPT | Performed by: PHYSICIAN ASSISTANT

## 2020-11-10 PROCEDURE — 82397 CHEMILUMINESCENT ASSAY: CPT

## 2020-11-11 ENCOUNTER — OFFICE VISIT (OUTPATIENT)
Dept: GASTROENTEROLOGY | Facility: CLINIC | Age: 40
End: 2020-11-11
Payer: COMMERCIAL

## 2020-11-11 VITALS
WEIGHT: 173 LBS | DIASTOLIC BLOOD PRESSURE: 60 MMHG | BODY MASS INDEX: 28.82 KG/M2 | HEIGHT: 65 IN | HEART RATE: 78 BPM | SYSTOLIC BLOOD PRESSURE: 126 MMHG | TEMPERATURE: 97.9 F

## 2020-11-11 DIAGNOSIS — K50.011 CROHN'S DISEASE OF SMALL INTESTINE WITH RECTAL BLEEDING (HCC): Primary | ICD-10-CM

## 2020-11-11 DIAGNOSIS — K58.1 IRRITABLE BOWEL SYNDROME WITH CONSTIPATION: ICD-10-CM

## 2020-11-11 DIAGNOSIS — K21.9 GASTROESOPHAGEAL REFLUX DISEASE WITHOUT ESOPHAGITIS: ICD-10-CM

## 2020-11-11 PROCEDURE — 99214 OFFICE O/P EST MOD 30 MIN: CPT | Performed by: INTERNAL MEDICINE

## 2020-11-11 RX ORDER — DOCUSATE SODIUM 100 MG/1
100 CAPSULE, LIQUID FILLED ORAL 2 TIMES DAILY
Qty: 60 CAPSULE | Refills: 5 | Status: SHIPPED | OUTPATIENT
Start: 2020-11-11 | End: 2021-06-08

## 2020-11-12 LAB — CALPROTECTIN STL-MCNT: 19 UG/G (ref 0–120)

## 2020-11-29 DIAGNOSIS — R51.9 CHRONIC HEADACHES: ICD-10-CM

## 2020-11-29 DIAGNOSIS — R10.84 GENERALIZED ABDOMINAL PAIN: ICD-10-CM

## 2020-11-29 DIAGNOSIS — K50.011 CROHN'S DISEASE OF SMALL INTESTINE WITH RECTAL BLEEDING (HCC): ICD-10-CM

## 2020-11-29 DIAGNOSIS — G89.29 CHRONIC HEADACHES: ICD-10-CM

## 2020-11-29 DIAGNOSIS — I10 ESSENTIAL HYPERTENSION: ICD-10-CM

## 2020-11-29 DIAGNOSIS — E66.9 OBESITY, CLASS I, BMI 30-34.9: ICD-10-CM

## 2020-11-29 DIAGNOSIS — K58.0 IRRITABLE BOWEL SYNDROME WITH DIARRHEA: ICD-10-CM

## 2020-11-30 RX ORDER — DICYCLOMINE HCL 20 MG
20 TABLET ORAL EVERY 8 HOURS
Qty: 90 TABLET | Refills: 0 | Status: SHIPPED | OUTPATIENT
Start: 2020-11-30 | End: 2021-01-15 | Stop reason: SDUPTHER

## 2020-11-30 RX ORDER — TOPIRAMATE 100 MG/1
100 TABLET, FILM COATED ORAL 2 TIMES DAILY
Qty: 60 TABLET | Refills: 2 | Status: SHIPPED | OUTPATIENT
Start: 2020-11-30 | End: 2021-02-01 | Stop reason: SDUPTHER

## 2020-12-09 ENCOUNTER — TELEPHONE (OUTPATIENT)
Dept: GASTROENTEROLOGY | Facility: CLINIC | Age: 40
End: 2020-12-09

## 2021-01-05 LAB — MISCELLANEOUS LAB TEST RESULT: NORMAL

## 2021-01-12 ENCOUNTER — TELEPHONE (OUTPATIENT)
Dept: GASTROENTEROLOGY | Facility: MEDICAL CENTER | Age: 41
End: 2021-01-12

## 2021-01-12 NOTE — TELEPHONE ENCOUNTER
----- Message from Paula Perez PA-C sent at 1/8/2021 10:05 AM EST -----  Please call and let the patient know her drug levels were present without antibodies, she should follow up in the office   Thank you

## 2021-01-15 DIAGNOSIS — R10.84 GENERALIZED ABDOMINAL PAIN: ICD-10-CM

## 2021-01-15 DIAGNOSIS — K50.011 CROHN'S DISEASE OF SMALL INTESTINE WITH RECTAL BLEEDING (HCC): ICD-10-CM

## 2021-01-15 DIAGNOSIS — K58.0 IRRITABLE BOWEL SYNDROME WITH DIARRHEA: ICD-10-CM

## 2021-01-15 DIAGNOSIS — K21.9 GASTROESOPHAGEAL REFLUX DISEASE WITHOUT ESOPHAGITIS: ICD-10-CM

## 2021-01-15 RX ORDER — ESOMEPRAZOLE MAGNESIUM 40 MG/1
40 CAPSULE, DELAYED RELEASE ORAL
Qty: 180 CAPSULE | Refills: 1 | Status: SHIPPED | OUTPATIENT
Start: 2021-01-15 | End: 2021-02-08 | Stop reason: SDUPTHER

## 2021-01-15 RX ORDER — DICYCLOMINE HCL 20 MG
20 TABLET ORAL EVERY 8 HOURS
Qty: 90 TABLET | Refills: 0 | Status: SHIPPED | OUTPATIENT
Start: 2021-01-15 | End: 2021-04-28 | Stop reason: SDUPTHER

## 2021-01-27 ENCOUNTER — TELEPHONE (OUTPATIENT)
Dept: GASTROENTEROLOGY | Facility: AMBULARY SURGERY CENTER | Age: 41
End: 2021-01-27

## 2021-01-27 DIAGNOSIS — K50.011 CROHN'S DISEASE OF SMALL INTESTINE WITH RECTAL BLEEDING (HCC): Primary | ICD-10-CM

## 2021-01-27 DIAGNOSIS — R11.0 NAUSEA: ICD-10-CM

## 2021-01-27 RX ORDER — ONDANSETRON 4 MG/1
4 TABLET, FILM COATED ORAL EVERY 8 HOURS PRN
Qty: 30 TABLET | Refills: 2 | Status: SHIPPED | OUTPATIENT
Start: 2021-01-27

## 2021-01-27 NOTE — TELEPHONE ENCOUNTER
Patient has hx of GERD, IBS alternate diarrhea/constipation worse pain with constipation, crohn's small intestine,  last office visit Nov 11  Recommend f/u in a few months time  She was seen in [facility] in October for abd pain  Documented  [facility] admission note: MRI enterography no evidence of bowel obstruction, inflammatory changes, no crohn's detected  S/p flex sig, no evidence of colitis  CT abd/Pelvis Oct, diffuse thickened wall of entire colon compatible with colitis  She called to report decreased appetite, vomit x 2 yesterday, ride sided abdominal pain radiating to back and left abdomen, diarrhea x 2 days ago now feels may be constipated due to taking bentyl  She is reporting no improvement in symptoms despite taking tylenol, nexium, stelara as well  Unclear if she is taking amitriptyline  She denies fever, passing gas with relief  I scheduled office visit Feb 2  In the meantime, do you want to order any imaging/bw, zofran for n/v? Please provide recommendation  Thank you

## 2021-01-27 NOTE — TELEPHONE ENCOUNTER
Patient aware of recommendations  She will get bw/stool studies done tomorrow and will use a [facility]  Also aware of zofran order as needed for n/v  We will see her in the office next week  In the meantime if symptoms worsen, recommend go to ED for assess/treat

## 2021-01-27 NOTE — TELEPHONE ENCOUNTER
Patients GI provider:  Dr Watts Or    Number to return call: [phone]    Reason for call: Pt calling requesting to speak with a nurse to discuss severe pain when sleeping and chills  Does not want to go to the ER       Scheduled procedure/appointment date if applicable: Apt/procedure

## 2021-01-28 ENCOUNTER — LAB (OUTPATIENT)
Dept: LAB | Facility: MEDICAL CENTER | Age: 41
End: 2021-01-28
Payer: COMMERCIAL

## 2021-01-28 DIAGNOSIS — K50.011 CROHN'S DISEASE OF SMALL INTESTINE WITH RECTAL BLEEDING (HCC): ICD-10-CM

## 2021-01-28 LAB
ALBUMIN SERPL BCP-MCNC: 3.9 G/DL (ref 3.5–5)
ALP SERPL-CCNC: 86 U/L (ref 46–116)
ALT SERPL W P-5'-P-CCNC: 22 U/L (ref 12–78)
ANION GAP SERPL CALCULATED.3IONS-SCNC: 7 MMOL/L (ref 4–13)
AST SERPL W P-5'-P-CCNC: 15 U/L (ref 5–45)
BASOPHILS # BLD AUTO: 0.05 THOUSANDS/ΜL (ref 0–0.1)
BASOPHILS NFR BLD AUTO: 1 % (ref 0–1)
BILIRUB SERPL-MCNC: 0.36 MG/DL (ref 0.2–1)
BUN SERPL-MCNC: 9 MG/DL (ref 5–25)
CALCIUM SERPL-MCNC: 9.3 MG/DL (ref 8.3–10.1)
CHLORIDE SERPL-SCNC: 114 MMOL/L (ref 100–108)
CO2 SERPL-SCNC: 22 MMOL/L (ref 21–32)
CREAT SERPL-MCNC: 0.86 MG/DL (ref 0.6–1.3)
CRP SERPL QL: 5.9 MG/L
EOSINOPHIL # BLD AUTO: 0.14 THOUSAND/ΜL (ref 0–0.61)
EOSINOPHIL NFR BLD AUTO: 2 % (ref 0–6)
ERYTHROCYTE [DISTWIDTH] IN BLOOD BY AUTOMATED COUNT: 13.6 % (ref 11.6–15.1)
GFR SERPL CREATININE-BSD FRML MDRD: 85 ML/MIN/1.73SQ M
GLUCOSE P FAST SERPL-MCNC: 93 MG/DL (ref 65–99)
HCT VFR BLD AUTO: 38.1 % (ref 34.8–46.1)
HGB BLD-MCNC: 12.3 G/DL (ref 11.5–15.4)
IMM GRANULOCYTES # BLD AUTO: 0.01 THOUSAND/UL (ref 0–0.2)
IMM GRANULOCYTES NFR BLD AUTO: 0 % (ref 0–2)
LYMPHOCYTES # BLD AUTO: 2.74 THOUSANDS/ΜL (ref 0.6–4.47)
LYMPHOCYTES NFR BLD AUTO: 36 % (ref 14–44)
MCH RBC QN AUTO: 29.6 PG (ref 26.8–34.3)
MCHC RBC AUTO-ENTMCNC: 32.3 G/DL (ref 31.4–37.4)
MCV RBC AUTO: 92 FL (ref 82–98)
MONOCYTES # BLD AUTO: 0.55 THOUSAND/ΜL (ref 0.17–1.22)
MONOCYTES NFR BLD AUTO: 7 % (ref 4–12)
NEUTROPHILS # BLD AUTO: 4.2 THOUSANDS/ΜL (ref 1.85–7.62)
NEUTS SEG NFR BLD AUTO: 54 % (ref 43–75)
NRBC BLD AUTO-RTO: 0 /100 WBCS
PLATELET # BLD AUTO: 264 THOUSANDS/UL (ref 149–390)
PMV BLD AUTO: 10.6 FL (ref 8.9–12.7)
POTASSIUM SERPL-SCNC: 3.8 MMOL/L (ref 3.5–5.3)
PROT SERPL-MCNC: 7.8 G/DL (ref 6.4–8.2)
RBC # BLD AUTO: 4.15 MILLION/UL (ref 3.81–5.12)
SODIUM SERPL-SCNC: 143 MMOL/L (ref 136–145)
WBC # BLD AUTO: 7.69 THOUSAND/UL (ref 4.31–10.16)

## 2021-01-28 PROCEDURE — 80053 COMPREHEN METABOLIC PANEL: CPT | Performed by: INTERNAL MEDICINE

## 2021-01-28 PROCEDURE — 36415 COLL VENOUS BLD VENIPUNCTURE: CPT

## 2021-01-28 PROCEDURE — 85025 COMPLETE CBC W/AUTO DIFF WBC: CPT

## 2021-01-28 PROCEDURE — 86140 C-REACTIVE PROTEIN: CPT

## 2021-02-01 DIAGNOSIS — G89.29 CHRONIC HEADACHES: ICD-10-CM

## 2021-02-01 DIAGNOSIS — E66.9 OBESITY, CLASS I, BMI 30-34.9: ICD-10-CM

## 2021-02-01 DIAGNOSIS — R51.9 CHRONIC HEADACHES: ICD-10-CM

## 2021-02-01 DIAGNOSIS — I10 ESSENTIAL HYPERTENSION: ICD-10-CM

## 2021-02-04 RX ORDER — TOPIRAMATE 100 MG/1
100 TABLET, FILM COATED ORAL 2 TIMES DAILY
Qty: 60 TABLET | Refills: 0 | Status: SHIPPED | OUTPATIENT
Start: 2021-02-04 | End: 2021-03-09 | Stop reason: SDUPTHER

## 2021-02-05 DIAGNOSIS — K50.10 CROHN'S DISEASE OF COLON WITHOUT COMPLICATION (HCC): ICD-10-CM

## 2021-02-05 DIAGNOSIS — K21.9 GASTROESOPHAGEAL REFLUX DISEASE WITHOUT ESOPHAGITIS: ICD-10-CM

## 2021-02-05 NOTE — TELEPHONE ENCOUNTER
GI Physician: Dr Stroud Brothvy for Medication: ustekinumab [facility]    Dose: 90 mg    Quantity: ?      Pharmacy and Location: [facility]      GI Physician: Dr Louisa Malin    Refill Request for Medication: esomeprazole     Dose: 40 mg    Quantity: 180    Pharmacy and Location: [facility]

## 2021-02-08 RX ORDER — ESOMEPRAZOLE MAGNESIUM 40 MG/1
40 CAPSULE, DELAYED RELEASE ORAL
Qty: 180 CAPSULE | Refills: 1 | Status: SHIPPED | OUTPATIENT
Start: 2021-02-08 | End: 2021-06-08

## 2021-03-09 ENCOUNTER — OFFICE VISIT (OUTPATIENT)
Dept: BARIATRICS | Facility: CLINIC | Age: 41
End: 2021-03-09
Payer: COMMERCIAL

## 2021-03-09 VITALS
BODY MASS INDEX: 28.92 KG/M2 | RESPIRATION RATE: 16 BRPM | HEART RATE: 74 BPM | SYSTOLIC BLOOD PRESSURE: 128 MMHG | DIASTOLIC BLOOD PRESSURE: 74 MMHG | TEMPERATURE: 98 F | WEIGHT: 173.6 LBS | HEIGHT: 65 IN

## 2021-03-09 DIAGNOSIS — E66.3 OVERWEIGHT: ICD-10-CM

## 2021-03-09 DIAGNOSIS — R63.5 ABNORMAL WEIGHT GAIN: Primary | ICD-10-CM

## 2021-03-09 DIAGNOSIS — R51.9 CHRONIC HEADACHES: ICD-10-CM

## 2021-03-09 DIAGNOSIS — G89.29 CHRONIC HEADACHES: ICD-10-CM

## 2021-03-09 PROCEDURE — 99214 OFFICE O/P EST MOD 30 MIN: CPT | Performed by: PHYSICIAN ASSISTANT

## 2021-03-09 RX ORDER — TOPIRAMATE 100 MG/1
100 TABLET, FILM COATED ORAL 2 TIMES DAILY
Qty: 60 TABLET | Refills: 3 | Status: SHIPPED | OUTPATIENT
Start: 2021-03-09 | End: 2021-05-20 | Stop reason: SDUPTHER

## 2021-03-09 NOTE — PROGRESS NOTES
Assessment/Plan:    Overweight  -Patient is pursuing Conservative Program  -Initial weight loss goal of 5-10% weight loss for improved health-met  -Topamax 100mg BID  BMP up to date  -hx hysterectomy  -will work on getting back to being more consistent with logging and exercising  -AVOID Saxenda as she has history of pancreatitis  Also avoid phentermine and metformin due to GI side effects and her issues with GI sxs/Crohns/IBS w constipation and diarrhea  Initial: 190 9 lbs  Current: 173 6 lbs  Change: - 17 3 lbs  Goal: 140 lbs    Goals:    Food log (ie ) www myfitnesspal com,sparkpeople  com,loseit com,calorieking  com,etc    No sugary beverages  At least 64oz of water daily  Increase physical activity by 10 minutes daily  Gradually increase physical activity to a goal of 5 days per week for 30 minutes of MODERATE intensity PLUS 2 days per week of FULL BODY resistance training  7522-5532 calories per day  5-10 servings of fruits and vegetables per day  Please try to food log 3x per week  Premier protein shake      Follow up in approximately 3 months with Non-Surgical Physician/Advanced Practitioner  Diagnoses and all orders for this visit:    Abnormal weight gain  Comments:  see plan under overweight    Overweight  -     topiramate (TOPAMAX) 100 mg tablet; Take 1 tablet (100 mg total) by mouth 2 (two) times a day    Chronic headaches  -     topiramate (TOPAMAX) 100 mg tablet; Take 1 tablet (100 mg total) by mouth 2 (two) times a day          Subjective:   Chief Complaint   Patient presents with    Follow-up     OD MWM follow up with Merlin Shields        Patient ID: Brian Kelley  is a 36 y o  female with excess weight/obesity here to pursue weight managment  Patient is pursuing Conservative Program      HPI Patient presents for overdue MWM follow up    Reports weight got down to 165 lbs   Reports it has been very rough with her hospitalizations since last visit and Son has been getting blood transfusions every 3 weeks  Reports she has been very preoccupied and not focusing as much on herself  Recently diagnosed with PNA and had some lack of appetite  Despite all of this she acknowledges that she remains very positive and denies emotional/stress eating  Questions about use of meal replacements - suggested trial of Premier protein to help with more structure and to avoid convenient foods that may not be provide best nutrition    -Not currently food logging    -Hydration: water ~ 1 gallon, coffee   -Exercise: treadmill/Elliptical just purchased - working on increasing time due to recent PNA    B: egg whites w/ tortilla or slice of turkey breast + fruit  S: skips  L: rice + chicken breast/fish + salad + vinegar/oil  S: skinny pop or rice cake Or apple  D: plantains + egg whites OR fish + sweet potato  S: skips    The following portions of the patient's history were reviewed and updated as appropriate: allergies, current medications, past family history, past medical history, past social history, past surgical history and problem list     Review of Systems   Cardiovascular: Negative  Psychiatric/Behavioral: Negative  Objective:    /74 (BP Location: Left arm, Patient Position: Sitting, Cuff Size: Adult)   Pulse 74   Temp 98 °F (36 7 °C) (Tympanic)   Resp 16   Ht 5' 4 7" (1 643 m)   Wt 78 7 kg (173 lb 9 6 oz)   LMP 04/28/2018   BMI 29 16 kg/m²      Physical Exam  Vitals signs and nursing note reviewed  Constitutional:       General: She is not in acute distress  Appearance: She is not toxic-appearing  HENT:      Head: Normocephalic and atraumatic  Eyes:      General: No scleral icterus  Pulmonary:      Effort: Pulmonary effort is normal  No respiratory distress  Musculoskeletal: Normal range of motion  Neurological:      General: No focal deficit present  Mental Status: She is alert and oriented to person, place, and time     Psychiatric:         Mood and Affect: Mood normal  Judgment: Judgment normal

## 2021-03-09 NOTE — ASSESSMENT & PLAN NOTE
-Patient is pursuing Conservative Program  -Initial weight loss goal of 5-10% weight loss for improved health-met  -Topamax 100mg BID  BMP up to date  -hx hysterectomy  -will work on getting back to being more consistent with logging and exercising  -AVOID Saxenda as she has history of pancreatitis  Also avoid phentermine and metformin due to GI side effects and her issues with GI sxs/Crohns/IBS w constipation and diarrhea         Initial: 190 9 lbs  Current: 173 6 lbs  Change: - 17 3 lbs  Goal: 140 lbs

## 2021-03-09 NOTE — PATIENT INSTRUCTIONS
Goals: Food log (ie ) www myfitnesspal com,sparkpeople  com,loseit com,calorieking  com,etc    No sugary beverages  At least 64oz of water daily  Increase physical activity by 10 minutes daily   Gradually increase physical activity to a goal of 5 days per week for 30 minutes of MODERATE intensity PLUS 2 days per week of FULL BODY resistance training  1250-0915 calories per day  5-10 servings of fruits and vegetables per day  Please try to food log 3x per week  Premier protein shake

## 2021-03-10 ENCOUNTER — APPOINTMENT (OUTPATIENT)
Dept: LAB | Facility: MEDICAL CENTER | Age: 41
End: 2021-03-10
Payer: COMMERCIAL

## 2021-03-10 PROCEDURE — 83993 ASSAY FOR CALPROTECTIN FECAL: CPT | Performed by: INTERNAL MEDICINE

## 2021-03-11 ENCOUNTER — OFFICE VISIT (OUTPATIENT)
Dept: GASTROENTEROLOGY | Facility: CLINIC | Age: 41
End: 2021-03-11
Payer: COMMERCIAL

## 2021-03-11 VITALS
BODY MASS INDEX: 29.53 KG/M2 | TEMPERATURE: 98 F | DIASTOLIC BLOOD PRESSURE: 72 MMHG | WEIGHT: 173 LBS | SYSTOLIC BLOOD PRESSURE: 124 MMHG | HEIGHT: 64 IN

## 2021-03-11 DIAGNOSIS — K21.9 GASTROESOPHAGEAL REFLUX DISEASE WITHOUT ESOPHAGITIS: ICD-10-CM

## 2021-03-11 DIAGNOSIS — K58.1 IRRITABLE BOWEL SYNDROME WITH CONSTIPATION: ICD-10-CM

## 2021-03-11 DIAGNOSIS — K50.011 CROHN'S DISEASE OF SMALL INTESTINE WITH RECTAL BLEEDING (HCC): Primary | ICD-10-CM

## 2021-03-11 PROCEDURE — 99214 OFFICE O/P EST MOD 30 MIN: CPT | Performed by: INTERNAL MEDICINE

## 2021-03-11 NOTE — PROGRESS NOTES
[facility] - Outpatient Follow-up Note  Malinda Sweeney 36 y o  female MRN: [id number]  Encounter: [id number]          ASSESSMENT AND PLAN:  36year old female here for follow up  1  Crohn's disease of small intestine with rectal bleeding ([facility] )- seen on CT enterography in   -continue q8 weeks stelara   -will  Follow fecal calprotectin result, recent CRP normal  -colonoscopy in August of 2020 was normal, has been on stelara for one year but still with a chronic RLQ pain  -pt already has gyne appt soon due to concern for her pain being related to scar tissue     2  Chronic RLQ pain  -treats her pain by cleaning, sleeping and folding clothes  -concern for this to be her Crohn's vs gyne origin vs musculoskeletal  -recommend doing MRI enterography to reevaluate     Follow-up in 4 months time        ______________________________________________________________________    SUBJECTIVE:  Pt just admitted for a PNA  Was covid negative  Treated with inpt stay as well as antibiotics which just completed  Still with SOB but improving  Presented with SOB and fever  She did have covid at the start of the pandemic last year  Still with abdominal pain in the RLQ everyday  Pain is always there  Is a 5-6 at it's best and 7-8 at it's worst   Sometimes it's mild in nature other times it's worse  Ate salad last night and pain increased  States salad sometimes makes her symptoms worse  REVIEW OF SYSTEMS IS OTHERWISE NEGATIVE        Historical Information   Past Medical History:   Diagnosis Date    [facility]     Cro[facility]'s disease of small intestine with rectal bleeding ([facility]     DDD (degenerative disc disease), lumbosacral 4/13/2018    Essential hypertension 8/12/2019    Gastroesophageal reflux disease without esophagitis 4/5/2018    Pneumonia     Sickle cell trait (HCC)     Urinary tract infection     Visual impairment      Past Surgical History:   Procedure Laterality Date     SECTION      CHOLECYSTECTOMY      COLONOSCOPY      HYSTERECTOMY      LIPOSUCTION      [place] COLONOSCOPY FLX DX W/COLLJ SPEC WHEN PFRMD N/A 2018    Procedure: EGD AND COLONOSCOPY;  Surgeon: Makenzie Phillips MD;  Location: [facility];   Service: Gastroenterology    SMALL INTESTINE SURGERY      TONSILLECTOMY      UPPER GASTROINTESTINAL ENDOSCOPY       Social History   Social History     Substance and Sexual Activity   Alcohol Use Yes    Frequency: Monthly or less    Comment: Occassionally     Social History     Substance and Sexual Activity   Drug Use No     Social History     Tobacco Use   Smoking Status Never Smoker   Smokeless Tobacco Never Used     Family History   Problem Relation Age of Onset    Hepatitis Mother     Thyroid disease Mother     Diabetes Father     Hypertension Father     Hyperlipidemia Father     Cataracts Father     Colon cancer Neg Hx        Meds/Allergies       Current Outpatient Medications:     acetaminophen (TYLENOL) 500 mg tablet    amitriptyline (ELAVIL) 10 mg tablet    cetirizine (ZyrTEC) 10 mg tablet    cyclobenzaprine (FLEXERIL) 10 mg tablet    dicyclomine (BENTYL) 20 mg tablet    diphenoxylate-atropine (LOMOTIL) 2 5-0 025 mg per tablet    docusate sodium (COLACE) 100 mg capsule    docusate sodium (COLACE) 100 mg capsule    esomeprazole (NexIUM) 40 MG capsule    famotidine (PEPCID) 40 MG tablet    fluticasone (FLONASE) 50 mcg/act nasal spray    levocetirizine (XYZAL) 5 MG tablet    ondansetron (ZOFRAN) 4 mg tablet    polyethylene glycol (MIRALAX) 17 g packet    topiramate (TOPAMAX) 100 mg tablet    ustekinumab (STELARA) 90 mg/mL subcutaneous injection    Allergies   Allergen Reactions    Aspirin Other (See Comments)     "I was a baby, I have no idea"    Morphine Itching     Objective     Blood pressure 124/72, temperature 98 °F (36 7 °C), temperature source Tympanic, height 5' 4" (1 626 m), weight 78 5 kg (173 lb), last menstrual period 04/28/2018, not currently breastfeeding  Body mass index is 29 7 kg/m²  PHYSICAL EXAM:      General Appearance:   Alert, cooperative, no distress   HEENT:   Normocephalic, atraumatic, anicteric      Neck:  Supple, symmetrical, trachea midline   Lungs:   Clear to auscultation bilaterally; no rales, rhonchi or wheezing; respirations unlabored    Heart[de-identified]   Regular rate and rhythm; no murmur, rub, or gallop  Abdomen:   Soft, non-tender, non-distended; normal bowel sounds; no masses, no organomegaly    Genitalia:   Deferred    Rectal:   Deferred    Extremities:  No cyanosis, clubbing or edema    Pulses:  2+ and symmetric    Skin:  No jaundice, rashes, or lesions    Lymph nodes:  No palpable cervical lymphadenopathy        Lab Results:   No visits with results within 1 Day(s) from this visit  Latest known visit with results is:   Lab on 01/28/2021   Component Date Value    CRP 01/28/2021 5 9*    WBC 01/28/2021 7 69     RBC 01/28/2021 4 15     Hemoglobin 01/28/2021 12 3     Hematocrit 01/28/2021 38 1     MCV 01/28/2021 92     MCH 01/28/2021 29 6     MCHC 01/28/2021 32 3     RDW 01/28/2021 13 6     MPV 01/28/2021 10 6     Platelets 37/40/5527 264     nRBC 01/28/2021 0     Neutrophils Relative 01/28/2021 54     Immat GRANS % 01/28/2021 0     Lymphocytes Relative 01/28/2021 36     Monocytes Relative 01/28/2021 7     Eosinophils Relative 01/28/2021 2     Basophils Relative 01/28/2021 1     Neutrophils Absolute 01/28/2021 4 20     Immature Grans Absolute 01/28/2021 0 01     Lymphocytes Absolute 01/28/2021 2 74     Monocytes Absolute 01/28/2021 0 55     Eosinophils Absolute 01/28/2021 0 14     Basophils Absolute 01/28/2021 0 05    [facility]  Radiology Results:   No results found

## 2021-03-15 LAB — CALPROTECTIN STL-MCNT: 32 UG/G (ref 0–120)

## 2021-03-30 ENCOUNTER — HOSPITAL ENCOUNTER (OUTPATIENT)
Dept: MRI IMAGING | Facility: HOSPITAL | Age: 41
Discharge: HOME | End: 2021-03-30
Attending: INTERNAL MEDICINE
Payer: COMMERCIAL

## 2021-03-30 DIAGNOSIS — K50.011 CROHN'S DISEASE OF SMALL INTESTINE WITH RECTAL BLEEDING (HCC): ICD-10-CM

## 2021-03-30 PROCEDURE — A9585 GADOBUTROL INJECTION: HCPCS | Performed by: INTERNAL MEDICINE

## 2021-03-30 PROCEDURE — 72197 MRI PELVIS W/O & W/DYE: CPT

## 2021-03-30 PROCEDURE — 74183 MRI ABD W/O CNTR FLWD CNTR: CPT

## 2021-03-30 PROCEDURE — G1004 CDSM NDSC: HCPCS

## 2021-03-30 RX ADMIN — GLUCAGON HYDROCHLORIDE 1 MG: KIT at 09:12

## 2021-03-30 RX ADMIN — GADOBUTROL 7 ML: 604.72 INJECTION INTRAVENOUS at 09:37

## 2021-04-06 ENCOUNTER — TELEPHONE (OUTPATIENT)
Dept: GASTROENTEROLOGY | Facility: CLINIC | Age: 41
End: 2021-04-06

## 2021-04-06 NOTE — TELEPHONE ENCOUNTER
Auth was submitted on cover my meds for Stelara 90 mg    We received approval today  Valid until 3-2022  [id number]    I called and spoke with pt , she will order online through [employer]        PA:    Patient would like results of MRI can you place so we may call patient      Thank You

## 2021-04-06 NOTE — TELEPHONE ENCOUNTER
MRI with no evidence of active IBD but a potential fistula stable since June 2018  Some incidental kidney findings of no significance  Also an incidental simply ovarian cyst that she should see GYN for as she does have RLQ pain

## 2021-04-14 DIAGNOSIS — K50.10 CROHN'S DISEASE OF COLON WITHOUT COMPLICATION (HCC): ICD-10-CM

## 2021-04-14 NOTE — TELEPHONE ENCOUNTER
Patients GI provider:  Dr Bell Griffith     Number to return call: [phone]     Reason for call: Pt called stated per pharmacy she will need a new refill for [facility]    Scheduled procedure/appointment date if applicable: Apt/procedure n/a

## 2021-04-28 DIAGNOSIS — K50.011 CROHN'S DISEASE OF SMALL INTESTINE WITH RECTAL BLEEDING (HCC): ICD-10-CM

## 2021-04-28 DIAGNOSIS — R10.84 GENERALIZED ABDOMINAL PAIN: ICD-10-CM

## 2021-04-28 DIAGNOSIS — K58.0 IRRITABLE BOWEL SYNDROME WITH DIARRHEA: ICD-10-CM

## 2021-04-28 RX ORDER — DICYCLOMINE HCL 20 MG
20 TABLET ORAL EVERY 8 HOURS
Qty: 90 TABLET | Refills: 0 | Status: SHIPPED | OUTPATIENT
Start: 2021-04-28 | End: 2021-12-09 | Stop reason: SDUPTHER

## 2021-05-20 DIAGNOSIS — E66.3 OVERWEIGHT: ICD-10-CM

## 2021-05-20 DIAGNOSIS — R51.9 CHRONIC HEADACHES: ICD-10-CM

## 2021-05-20 DIAGNOSIS — G89.29 CHRONIC HEADACHES: ICD-10-CM

## 2021-05-20 RX ORDER — TOPIRAMATE 100 MG/1
100 TABLET, FILM COATED ORAL 2 TIMES DAILY
Qty: 60 TABLET | Refills: 0 | Status: SHIPPED | OUTPATIENT
Start: 2021-05-20 | End: 2021-06-04 | Stop reason: SDUPTHER

## 2021-06-04 DIAGNOSIS — R51.9 CHRONIC HEADACHES: ICD-10-CM

## 2021-06-04 DIAGNOSIS — G89.29 CHRONIC HEADACHES: ICD-10-CM

## 2021-06-04 DIAGNOSIS — E66.3 OVERWEIGHT: ICD-10-CM

## 2021-06-04 RX ORDER — TOPIRAMATE 100 MG/1
100 TABLET, FILM COATED ORAL 2 TIMES DAILY
Qty: 60 TABLET | Refills: 0 | Status: SHIPPED | OUTPATIENT
Start: 2021-06-04 | End: 2021-06-08 | Stop reason: SDUPTHER

## 2021-06-08 ENCOUNTER — OFFICE VISIT (OUTPATIENT)
Dept: BARIATRICS | Facility: CLINIC | Age: 41
End: 2021-06-08
Payer: COMMERCIAL

## 2021-06-08 VITALS
DIASTOLIC BLOOD PRESSURE: 82 MMHG | WEIGHT: 173.4 LBS | BODY MASS INDEX: 28.89 KG/M2 | HEART RATE: 79 BPM | HEIGHT: 65 IN | SYSTOLIC BLOOD PRESSURE: 124 MMHG | TEMPERATURE: 97.9 F | RESPIRATION RATE: 16 BRPM

## 2021-06-08 DIAGNOSIS — G89.29 CHRONIC HEADACHES: ICD-10-CM

## 2021-06-08 DIAGNOSIS — R51.9 CHRONIC HEADACHES: ICD-10-CM

## 2021-06-08 DIAGNOSIS — E66.3 OVERWEIGHT: ICD-10-CM

## 2021-06-08 PROCEDURE — 99214 OFFICE O/P EST MOD 30 MIN: CPT | Performed by: FAMILY MEDICINE

## 2021-06-08 RX ORDER — TIZANIDINE 2 MG/1
2 TABLET ORAL EVERY 6 HOURS PRN
COMMUNITY
Start: 2021-05-12 | End: 2021-11-19 | Stop reason: CLARIF

## 2021-06-08 RX ORDER — CETIRIZINE HYDROCHLORIDE 10 MG/1
10 CAPSULE, LIQUID FILLED ORAL DAILY
COMMUNITY
Start: 2021-04-09 | End: 2021-11-19 | Stop reason: CLARIF

## 2021-06-08 RX ORDER — TOPIRAMATE 100 MG/1
100 TABLET, FILM COATED ORAL 2 TIMES DAILY
Qty: 60 TABLET | Refills: 5 | Status: SHIPPED | OUTPATIENT
Start: 2021-06-08 | End: 2021-07-10 | Stop reason: SDUPTHER

## 2021-06-08 RX ORDER — GABAPENTIN 300 MG/1
300 CAPSULE ORAL 3 TIMES DAILY
COMMUNITY
Start: 2021-05-12 | End: 2021-11-19 | Stop reason: CLARIF

## 2021-06-08 RX ORDER — METOCLOPRAMIDE 5 MG/1
5 TABLET ORAL 4 TIMES DAILY PRN
COMMUNITY
Start: 2021-05-28

## 2021-06-08 RX ORDER — ALBUTEROL SULFATE 90 UG/1
1 AEROSOL, METERED RESPIRATORY (INHALATION) DAILY
COMMUNITY
Start: 2021-04-28

## 2021-06-08 NOTE — PROGRESS NOTES
Assessment/Plan:    No problem-specific Assessment & Plan notes found for this encounter  Diagnoses and all orders for this visit:    Chronic headaches    Overweight    Other orders  -     Cetirizine HCl (ZyrTEC Allergy) 10 MG CAPS; Take 10 mg by mouth daily  -     albuterol (PROVENTIL HFA,VENTOLIN HFA) 90 mcg/act inhaler; Inhale 1 puff daily  -     gabapentin (NEURONTIN) 300 mg capsule; Take 300 mg by mouth Three times a day  -     metoclopramide (REGLAN) 5 mg tablet; Take 5 mg by mouth 4 (four) times a day as needed  -     tiZANidine (ZANAFLEX) 2 mg tablet; Take 2 mg by mouth every 6 (six) hours as needed        Overweight  -Patient is pursuing Conservative Program  -Initial weight loss goal of 5-10% weight loss for improved health-met  -Topamax 100mg BID  BMP up to date  -hx hysterectomy  -will work on getting back to being more consistent with logging and exercising  -AVOID Saxenda as she has history of pancreatitis  Also avoid phentermine and metformin due to GI side effects and her issues with GI sxs/Crohns/IBS w constipation and diarrhea       Initial: 190 9 lbs  Current: 173 4 lbs  Change: - 17 3 lbs  Goal: 140 lbs     Goals:  Food log (ie ) www myfitnesspal com,sparkpeople  com,loseit com,calorieking  com,etc    No sugary beverages  At least 64oz of water daily  Increase physical activity by 10 minutes daily  Gradually increase physical activity to a goal of 5 days per week for 30 minutes of MODERATE intensity PLUS 2 days per week of FULL BODY resistance training  [id number] calories per day  5-10 servings of fruits and vegetables per day  Please try to food log 3x per week  Premier protein shake     Follow up in approximately 6 months with Non-Surgical Physician/Advanced Practitioner  Subjective:   Chief Complaint   Patient presents with    Follow-up     3 month MWM follow up        Patient ID: Hanna Farrell  is a 39 y o  female with excess weight/obesity here to pursue weight management    Patient is pursuing Conservative Program      HPI    3 month follow up  Currently on topamax 100mg qd and tolerating well    She was started on gabapentin and amytriptiline for pelvic floor dysfunction by her OBGYN  She admits increase hunger with this medicines but is helping her pain  The following portions of the patient's history were reviewed and updated as appropriate: allergies, current medications, past family history, past medical history, past social history, past surgical history and problem list     Review of Systems   Constitutional: Negative for activity change and appetite change  Respiratory: Negative  Cardiovascular: Negative  Gastrointestinal: Negative  Genitourinary: Negative  Musculoskeletal: Negative for arthralgias  Skin: Negative for rash  Psychiatric/Behavioral: Negative  Objective:    /82 (BP Location: Left arm, Patient Position: Sitting, Cuff Size: Adult)   Pulse 79   Temp 97 9 °F (36 6 °C) (Tympanic)   Resp 16   Ht 5' 4 7" (1 643 m)   Wt 78 7 kg (173 lb 6 4 oz)   LMP 04/28/2018   BMI 29 12 kg/m²      Physical Exam   Constitutional   General appearance: Abnormal   well developed and overweight  Eyes No conjunctival pallor     Musculoskeletal   Gait and station: Normal     Psychiatric   Orientation to person, place and time: Normal     Affect: appropriate

## 2021-07-10 DIAGNOSIS — G89.29 CHRONIC HEADACHES: ICD-10-CM

## 2021-07-10 DIAGNOSIS — E66.3 OVERWEIGHT: ICD-10-CM

## 2021-07-10 DIAGNOSIS — R51.9 CHRONIC HEADACHES: ICD-10-CM

## 2021-07-12 RX ORDER — TOPIRAMATE 100 MG/1
100 TABLET, FILM COATED ORAL 2 TIMES DAILY
Qty: 60 TABLET | Refills: 0 | Status: SHIPPED | OUTPATIENT
Start: 2021-07-12 | End: 2021-07-13

## 2021-07-13 DIAGNOSIS — E66.3 OVERWEIGHT: ICD-10-CM

## 2021-07-13 DIAGNOSIS — G89.29 CHRONIC HEADACHES: ICD-10-CM

## 2021-07-13 DIAGNOSIS — R51.9 CHRONIC HEADACHES: ICD-10-CM

## 2021-07-13 RX ORDER — TOPIRAMATE 100 MG/1
100 TABLET, FILM COATED ORAL 2 TIMES DAILY
Qty: 180 TABLET | Refills: 1 | Status: SHIPPED | OUTPATIENT
Start: 2021-07-13 | End: 2021-10-11 | Stop reason: SDUPTHER

## 2021-07-13 RX ORDER — TOPIRAMATE 100 MG/1
TABLET, FILM COATED ORAL
Qty: 60 TABLET | Refills: 0 | Status: SHIPPED | OUTPATIENT
Start: 2021-07-13 | End: 2021-07-13 | Stop reason: SDUPTHER

## 2021-10-21 ENCOUNTER — OFFICE VISIT (OUTPATIENT)
Dept: GASTROENTEROLOGY | Facility: CLINIC | Age: 41
End: 2021-10-21
Payer: COMMERCIAL

## 2021-10-21 VITALS
TEMPERATURE: 96.2 F | DIASTOLIC BLOOD PRESSURE: 60 MMHG | HEIGHT: 65 IN | HEART RATE: 61 BPM | BODY MASS INDEX: 28.32 KG/M2 | SYSTOLIC BLOOD PRESSURE: 118 MMHG | OXYGEN SATURATION: 99 % | WEIGHT: 170 LBS

## 2021-10-21 DIAGNOSIS — K50.011 CROHN'S DISEASE OF SMALL INTESTINE WITH RECTAL BLEEDING (HCC): Primary | ICD-10-CM

## 2021-10-21 PROCEDURE — 99214 OFFICE O/P EST MOD 30 MIN: CPT | Performed by: PHYSICIAN ASSISTANT

## 2021-10-21 RX ORDER — TRAMADOL HYDROCHLORIDE 50 MG/1
50 TABLET ORAL EVERY 6 HOURS PRN
COMMUNITY
Start: 2021-09-21 | End: 2022-09-21

## 2021-10-21 RX ORDER — CYCLOBENZAPRINE HCL 5 MG
TABLET ORAL
COMMUNITY
Start: 2021-07-14

## 2021-10-21 RX ORDER — MECLIZINE HYDROCHLORIDE 25 MG/1
TABLET ORAL
COMMUNITY
Start: 2021-10-11

## 2021-10-21 RX ORDER — CETIRIZINE HYDROCHLORIDE 10 MG/1
10 TABLET ORAL
COMMUNITY
Start: 2021-04-09 | End: 2022-04-09

## 2021-11-08 ENCOUNTER — APPOINTMENT (OUTPATIENT)
Dept: LAB | Facility: HOSPITAL | Age: 41
End: 2021-11-08
Payer: COMMERCIAL

## 2021-11-08 DIAGNOSIS — K50.011 CROHN'S DISEASE OF SMALL INTESTINE WITH RECTAL BLEEDING (HCC): ICD-10-CM

## 2021-11-08 LAB
25(OH)D3 SERPL-MCNC: 10 NG/ML (ref 30–100)
CRP SERPL QL: 7.6 MG/L
FERRITIN SERPL-MCNC: 206 NG/ML (ref 8–388)
FOLATE SERPL-MCNC: 14.4 NG/ML (ref 3.1–17.5)
IRON SATN MFR SERPL: 59 % (ref 15–50)
IRON SERPL-MCNC: 186 UG/DL (ref 50–170)
TIBC SERPL-MCNC: 315 UG/DL (ref 250–450)
VIT B12 SERPL-MCNC: 494 PG/ML (ref 100–900)

## 2021-11-08 PROCEDURE — 82306 VITAMIN D 25 HYDROXY: CPT

## 2021-11-08 PROCEDURE — 82607 VITAMIN B-12: CPT

## 2021-11-08 PROCEDURE — 83550 IRON BINDING TEST: CPT

## 2021-11-08 PROCEDURE — 83540 ASSAY OF IRON: CPT

## 2021-11-08 PROCEDURE — 36415 COLL VENOUS BLD VENIPUNCTURE: CPT

## 2021-11-08 PROCEDURE — 82746 ASSAY OF FOLIC ACID SERUM: CPT

## 2021-11-08 PROCEDURE — 82728 ASSAY OF FERRITIN: CPT

## 2021-11-08 PROCEDURE — 86140 C-REACTIVE PROTEIN: CPT

## 2021-11-11 ENCOUNTER — APPOINTMENT (OUTPATIENT)
Dept: LAB | Facility: HOSPITAL | Age: 41
End: 2021-11-11
Payer: COMMERCIAL

## 2021-11-11 DIAGNOSIS — K50.011 CROHN'S DISEASE OF SMALL INTESTINE WITH RECTAL BLEEDING (HCC): ICD-10-CM

## 2021-11-11 PROCEDURE — 87209 SMEAR COMPLEX STAIN: CPT

## 2021-11-11 PROCEDURE — 87177 OVA AND PARASITES SMEARS: CPT

## 2021-11-11 PROCEDURE — 87505 NFCT AGENT DETECTION GI: CPT

## 2021-11-11 PROCEDURE — 87493 C DIFF AMPLIFIED PROBE: CPT

## 2021-11-11 PROCEDURE — 83993 ASSAY FOR CALPROTECTIN FECAL: CPT

## 2021-11-12 LAB
C DIFF TOX GENS STL QL NAA+PROBE: NEGATIVE
CAMPYLOBACTER DNA SPEC NAA+PROBE: NORMAL
SALMONELLA DNA SPEC QL NAA+PROBE: NORMAL
SHIGA TOXIN STX GENE SPEC NAA+PROBE: NORMAL
SHIGELLA DNA SPEC QL NAA+PROBE: NORMAL

## 2021-11-16 LAB
CALPROTECTIN STL-MCNT: 121 UG/G (ref 0–120)
O+P STL CONC: NORMAL

## 2021-11-18 ENCOUNTER — TELEPHONE (OUTPATIENT)
Dept: GASTROENTEROLOGY | Facility: CLINIC | Age: 41
End: 2021-11-18

## 2021-11-19 ENCOUNTER — OFFICE VISIT (OUTPATIENT)
Dept: GASTROENTEROLOGY | Facility: CLINIC | Age: 41
End: 2021-11-19
Payer: COMMERCIAL

## 2021-11-19 ENCOUNTER — TELEPHONE (OUTPATIENT)
Dept: OTHER | Facility: OTHER | Age: 41
End: 2021-11-19

## 2021-11-19 VITALS
BODY MASS INDEX: 28.68 KG/M2 | SYSTOLIC BLOOD PRESSURE: 112 MMHG | HEART RATE: 79 BPM | HEIGHT: 64 IN | WEIGHT: 168 LBS | TEMPERATURE: 97.7 F | DIASTOLIC BLOOD PRESSURE: 70 MMHG | OXYGEN SATURATION: 99 %

## 2021-11-19 DIAGNOSIS — K50.011 CROHN'S DISEASE OF SMALL INTESTINE WITH RECTAL BLEEDING (HCC): Primary | ICD-10-CM

## 2021-11-19 DIAGNOSIS — K21.9 GASTROESOPHAGEAL REFLUX DISEASE WITHOUT ESOPHAGITIS: ICD-10-CM

## 2021-11-19 DIAGNOSIS — K58.1 IRRITABLE BOWEL SYNDROME WITH CONSTIPATION: ICD-10-CM

## 2021-11-19 PROCEDURE — 99214 OFFICE O/P EST MOD 30 MIN: CPT | Performed by: INTERNAL MEDICINE

## 2021-12-17 DIAGNOSIS — K50.10 CROHN'S DISEASE OF COLON WITHOUT COMPLICATION (HCC): ICD-10-CM

## 2021-12-20 RX ORDER — USTEKINUMAB 90 MG/ML
90 INJECTION, SOLUTION SUBCUTANEOUS
Qty: 1 ML | Refills: 6 | Status: SHIPPED | OUTPATIENT
Start: 2021-12-20 | End: 2022-03-15 | Stop reason: SDUPTHER

## 2021-12-27 ENCOUNTER — TELEMEDICINE (OUTPATIENT)
Dept: BARIATRICS | Facility: CLINIC | Age: 41
End: 2021-12-27
Payer: COMMERCIAL

## 2021-12-27 VITALS — BODY MASS INDEX: 26.46 KG/M2 | HEIGHT: 64 IN | WEIGHT: 155 LBS

## 2021-12-27 DIAGNOSIS — E66.3 OVERWEIGHT: Primary | ICD-10-CM

## 2021-12-27 DIAGNOSIS — J45.909 ASTHMA: ICD-10-CM

## 2021-12-27 DIAGNOSIS — K21.9 GASTROESOPHAGEAL REFLUX DISEASE WITHOUT ESOPHAGITIS: ICD-10-CM

## 2021-12-27 PROCEDURE — 99214 OFFICE O/P EST MOD 30 MIN: CPT | Performed by: PHYSICIAN ASSISTANT

## 2021-12-27 RX ORDER — ERGOCALCIFEROL 1.25 MG/1
50000 CAPSULE ORAL WEEKLY
COMMUNITY
Start: 2021-11-09

## 2021-12-27 RX ORDER — DULOXETIN HYDROCHLORIDE 30 MG/1
30 CAPSULE, DELAYED RELEASE ORAL DAILY
COMMUNITY
Start: 2021-11-04

## 2022-02-20 DIAGNOSIS — R51.9 CHRONIC HEADACHES: ICD-10-CM

## 2022-02-20 DIAGNOSIS — E66.3 OVERWEIGHT: ICD-10-CM

## 2022-02-20 DIAGNOSIS — G89.29 CHRONIC HEADACHES: ICD-10-CM

## 2022-02-21 RX ORDER — TOPIRAMATE 100 MG/1
100 TABLET, FILM COATED ORAL 2 TIMES DAILY
Qty: 180 TABLET | Refills: 0 | Status: SHIPPED | OUTPATIENT
Start: 2022-02-21 | End: 2022-05-25 | Stop reason: SDUPTHER

## 2022-03-04 ENCOUNTER — OFFICE VISIT (OUTPATIENT)
Dept: GASTROENTEROLOGY | Facility: CLINIC | Age: 42
End: 2022-03-04
Payer: COMMERCIAL

## 2022-03-04 VITALS
DIASTOLIC BLOOD PRESSURE: 72 MMHG | RESPIRATION RATE: 18 BRPM | BODY MASS INDEX: 28.34 KG/M2 | TEMPERATURE: 98.1 F | HEART RATE: 70 BPM | HEIGHT: 64 IN | WEIGHT: 166 LBS | SYSTOLIC BLOOD PRESSURE: 112 MMHG | OXYGEN SATURATION: 98 %

## 2022-03-04 DIAGNOSIS — K21.9 GASTROESOPHAGEAL REFLUX DISEASE WITHOUT ESOPHAGITIS: ICD-10-CM

## 2022-03-04 DIAGNOSIS — K58.1 IRRITABLE BOWEL SYNDROME WITH CONSTIPATION: Primary | ICD-10-CM

## 2022-03-04 DIAGNOSIS — R10.13 EPIGASTRIC PAIN: ICD-10-CM

## 2022-03-04 DIAGNOSIS — K50.011 CROHN'S DISEASE OF SMALL INTESTINE WITH RECTAL BLEEDING (HCC): ICD-10-CM

## 2022-03-04 PROCEDURE — 99215 OFFICE O/P EST HI 40 MIN: CPT | Performed by: INTERNAL MEDICINE

## 2022-03-04 RX ORDER — FAMOTIDINE 40 MG/1
40 TABLET, FILM COATED ORAL 2 TIMES DAILY PRN
Qty: 60 TABLET | Refills: 5 | Status: SHIPPED | OUTPATIENT
Start: 2022-03-04 | End: 2022-06-07 | Stop reason: SDUPTHER

## 2022-03-04 NOTE — PATIENT INSTRUCTIONS
Scheduled date of EGD(as of today):04 19 22  Physician performing EGD:DR CACERES  Location of EGD:[facility]  Instructions reviewed with patient by:MARIA C IN THE OFFICE  Clearances: N/A

## 2022-03-04 NOTE — PROGRESS NOTES
[facility] - Outpatient Follow-up Note  Azalia Fonseca 39 y o  female MRN: [id number]  Encounter: [id number]    ASSESSMENT AND PLAN:  39year old female here for follow up  1  Irritable bowel syndrome with constipation  -continue treatment    2  Gastroesophageal reflux disease without esophagitis  - EGD; Future  -given continued epigastric pain will do EGD to assess for any upper GI tract involvement of Crohn's disease    3  Crohn's disease of small intestine with rectal bleeding ([facility] )  -continue stelara  -MRI of  without any active IBD    4  Epigastric pain  - famotidine (PEPCID) 40 MG tablet; Take 1 tablet (40 mg total) by mouth 2 (two) times a day as needed for indigestion or heartburn  Dispense: 60 tablet; Refill: 5    Follow up in a few months time  ______________________________________________________________________    SUBJECTIVE:    Feeling well  Still with constipation but denies abdominal pain at this time  REVIEW OF SYSTEMS IS OTHERWISE NEGATIVE  Historical Information   Past Medical History:   Diagnosis Date    Crohn disease ([facility] )     Crohn's disease of small intestine with rectal bleeding ([facility] )     DDD (degenerative disc disease), lumbosacral 2018    Essential hypertension 2019    Gastroesophageal reflux disease without esophagitis 2018    Pneumonia     Sickle cell trait (HCC)     Urinary tract infection     Visual impairment      Past Surgical History:   Procedure Laterality Date     SECTION      CHOLECYSTECTOMY      COLONOSCOPY      HYSTERECTOMY      LIPOSUCTION      MA COLONOSCOPY FLX DX W/COLLJ SPEC WHEN PFRMD N/A 2018    Procedure: EGD AND COLONOSCOPY;  Surgeon: Angela Dias MD;  Location: [facility];   Service: Gastroenterology    SMALL INTESTINE SURGERY      TONSILLECTOMY      UPPER GASTROINTESTINAL ENDOSCOPY       Social History   Social History     Substance and Sexual Activity   Alcohol Use Yes Comment: Occassionally     Social History     Substance and Sexual Activity   Drug Use No     Social History     Tobacco Use   Smoking Status Never Smoker   Smokeless Tobacco Never Used     Family History   Problem Relation Age of Onset    Hepatitis Mother     Thyroid disease Mother     Diabetes Father     Hypertension Father     Hyperlipidemia Father     Cataracts Father     Colon cancer Neg Hx        Meds/Allergies       Current Outpatient Medications:     acetaminophen (TYLENOL) 500 mg tablet    albuterol (PROVENTIL HFA,VENTOLIN HFA) 90 mcg/act inhaler    amitriptyline (ELAVIL) 10 mg tablet    cetirizine (ZyrTEC) 10 mg tablet    cetirizine (ZyrTEC) 10 mg tablet    cyclobenzaprine (FLEXERIL) 10 mg tablet    cyclobenzaprine (FLEXERIL) 5 mg tablet    dicyclomine (BENTYL) 20 mg tablet    diphenoxylate-atropine (LOMOTIL) 2 5-0 025 mg per tablet    docusate sodium (COLACE) 100 mg capsule    DULoxetine (CYMBALTA) 30 mg delayed release capsule    ergocalciferol (VITAMIN D2) 50,000 units    famotidine (PEPCID) 40 MG tablet    levocetirizine (XYZAL) 5 MG tablet    meclizine (ANTIVERT) 25 mg tablet    metoclopramide (REGLAN) 5 mg tablet    ondansetron (ZOFRAN) 4 mg tablet    topiramate (TOPAMAX) 100 mg tablet    traMADol (ULTRAM) 50 mg tablet    Cholecalciferol 1 25 MG (20706 UT) capsule    fluticasone (FLONASE) 50 mcg/act nasal spray    ustekinumab (Stelara) 90 mg/mL subcutaneous injection    Allergies   Allergen Reactions    Aspirin Other (See Comments)     "I was a baby, I have no idea"    Morphine Itching     Objective     Blood pressure 112/72, pulse 70, temperature 98 1 °F (36 7 °C), temperature source Tympanic, resp  rate 18, height 5' 4" (1 626 m), weight 75 3 kg (166 lb), last menstrual period 04/28/2018, SpO2 98 %, not currently breastfeeding  Body mass index is 28 49 kg/m²      PHYSICAL EXAM:      General Appearance:   Alert, cooperative, no distress   HEENT:   Normocephalic, atraumatic, anicteric      Neck:  Supple, symmetrical, trachea midline   Lungs:   Clear to auscultation bilaterally; no rales, rhonchi or wheezing; respirations unlabored    Heart[de-identified]   Regular rate and rhythm; no murmur, rub, or gallop  Abdomen:   Soft, non-tender, non-distended; normal bowel sounds; no masses, no organomegaly    Genitalia:   Deferred    Rectal:   Deferred    Extremities:  No cyanosis, clubbing or edema    Pulses:  2+ and symmetric    Skin:  No jaundice, rashes, or lesions    Lymph nodes:  No palpable cervical lymphadenopathy        Lab Results:   No visits with results within 1 Day(s) from this visit  Latest known visit with results is:   Appointment on 11/11/2021   Component Date Value    Calprotectin 11/11/2021 121*    Salmonella sp PCR 11/11/2021 None Detected     Shigella sp/Enteroinvasi* 11/11/2021 None Detected     Campylobacter sp (jejuni* 11/11/2021 None Detected     Shiga toxin 1/Shiga toxi* 11/11/2021 None Detected     C difficile toxin by PCR 11/11/2021 Negative     Ova + Parasite Exam 11/11/2021 No ova, cysts, or parasites seen     One negative specimen does not rule out the possibility of a  parasitic infection  Radiology Results:   No results found

## 2022-03-10 ENCOUNTER — TELEPHONE (OUTPATIENT)
Dept: GASTROENTEROLOGY | Facility: CLINIC | Age: 42
End: 2022-03-10

## 2022-03-15 NOTE — TELEPHONE ENCOUNTER
[facility]  [facility] was also another [facility] choice so let me know if this was the [facility]

## 2022-03-16 NOTE — TELEPHONE ENCOUNTER
Approved until 3/15/23   I will scan the paperwork in tomorrow from my desk when I have access to my scanner, thanks

## 2022-04-18 ENCOUNTER — ANESTHESIA EVENT (OUTPATIENT)
Dept: ANESTHESIOLOGY | Facility: HOSPITAL | Age: 42
End: 2022-04-18

## 2022-04-18 ENCOUNTER — ANESTHESIA (OUTPATIENT)
Dept: ANESTHESIOLOGY | Facility: HOSPITAL | Age: 42
End: 2022-04-18

## 2022-04-18 NOTE — ANESTHESIA PREPROCEDURE EVALUATION
Procedure:  PRE-OP ONLY    Relevant Problems   CARDIO   (+) Essential hypertension   (+) Hypertriglyceridemia      GI/HEPATIC   (+) Gastroesophageal reflux disease without esophagitis      GYN   (+) History of hysterectomy      MUSCULOSKELETAL   (+) DDD (degenerative disc disease), lumbosacral   (+) Inflammatory polyarthropathy (HCC)      NEURO/PSYCH   (+) Chronic headaches   (+) Chronic pelvic pain in female      PULMONARY   (+) Asthma     EKG 12/2021:  NORMAL SINUS RHYTHM   NORMAL ECG  TTE 11/2020:  Normal LV size and function  LVEF 60-65%  Normal diastolic function  Normal RV size and function  Normal atrial size  No significant valvular dysfunction  No pericardial effusion  Lab Results   Component Value Date    WBC 7 69 01/28/2021    HGB 12 3 01/28/2021    HCT 38 1 01/28/2021    MCV 92 01/28/2021     01/28/2021     Lab Results   Component Value Date    SODIUM 143 01/28/2021    K 3 8 01/28/2021     (H) 01/28/2021    CO2 22 01/28/2021    BUN 9 01/28/2021    CREATININE 0 86 01/28/2021    CALCIUM 9 3 01/28/2021     No results found for: INR, PROTIME  Lab Results   Component Value Date    HGBA1C 5 4 07/16/2019               Anesthesia Plan  ASA Score- 2     Anesthesia Type- IV sedation with anesthesia with ASA Monitors  Additional Monitors:   Airway Plan:           Plan Factors-    Chart reviewed  EKG reviewed  Existing labs reviewed  Patient summary reviewed  Induction- intravenous      Postoperative Plan-     Informed Consent-

## 2022-04-19 ENCOUNTER — HOSPITAL ENCOUNTER (OUTPATIENT)
Dept: GASTROENTEROLOGY | Facility: HOSPITAL | Age: 42
Setting detail: OUTPATIENT SURGERY
Discharge: HOME | End: 2022-04-19
Attending: INTERNAL MEDICINE
Payer: COMMERCIAL

## 2022-04-19 ENCOUNTER — ANESTHESIA (OUTPATIENT)
Dept: GASTROENTEROLOGY | Facility: HOSPITAL | Age: 42
End: 2022-04-19

## 2022-04-19 ENCOUNTER — ANESTHESIA EVENT (OUTPATIENT)
Dept: GASTROENTEROLOGY | Facility: HOSPITAL | Age: 42
End: 2022-04-19

## 2022-04-19 VITALS
DIASTOLIC BLOOD PRESSURE: 67 MMHG | OXYGEN SATURATION: 98 % | SYSTOLIC BLOOD PRESSURE: 111 MMHG | TEMPERATURE: 97.8 F | RESPIRATION RATE: 20 BRPM | HEART RATE: 74 BPM

## 2022-04-19 DIAGNOSIS — K21.9 GASTROESOPHAGEAL REFLUX DISEASE WITHOUT ESOPHAGITIS: ICD-10-CM

## 2022-04-19 PROCEDURE — 88305 TISSUE EXAM BY PATHOLOGIST: CPT | Performed by: PATHOLOGY

## 2022-04-19 PROCEDURE — 43239 EGD BIOPSY SINGLE/MULTIPLE: CPT | Performed by: INTERNAL MEDICINE

## 2022-04-19 RX ORDER — FENTANYL CITRATE 50 UG/ML
INJECTION, SOLUTION INTRAMUSCULAR; INTRAVENOUS AS NEEDED
Status: DISCONTINUED | OUTPATIENT
Start: 2022-04-19 | End: 2022-04-19

## 2022-04-19 RX ORDER — SODIUM CHLORIDE, SODIUM LACTATE, POTASSIUM CHLORIDE, CALCIUM CHLORIDE 600; 310; 30; 20 MG/100ML; MG/100ML; MG/100ML; MG/100ML
INJECTION, SOLUTION INTRAVENOUS CONTINUOUS PRN
Status: DISCONTINUED | OUTPATIENT
Start: 2022-04-19 | End: 2022-04-19

## 2022-04-19 RX ORDER — LIDOCAINE HYDROCHLORIDE 10 MG/ML
INJECTION, SOLUTION EPIDURAL; INFILTRATION; INTRACAUDAL; PERINEURAL AS NEEDED
Status: DISCONTINUED | OUTPATIENT
Start: 2022-04-19 | End: 2022-04-19

## 2022-04-19 RX ORDER — PANTOPRAZOLE SODIUM 40 MG/1
40 TABLET, DELAYED RELEASE ORAL DAILY
COMMUNITY

## 2022-04-19 RX ORDER — PROPOFOL 10 MG/ML
INJECTION, EMULSION INTRAVENOUS CONTINUOUS PRN
Status: DISCONTINUED | OUTPATIENT
Start: 2022-04-19 | End: 2022-04-19

## 2022-04-19 RX ORDER — PROPOFOL 10 MG/ML
INJECTION, EMULSION INTRAVENOUS AS NEEDED
Status: DISCONTINUED | OUTPATIENT
Start: 2022-04-19 | End: 2022-04-19

## 2022-04-19 RX ADMIN — PROPOFOL 120 MG: 10 INJECTION, EMULSION INTRAVENOUS at 09:39

## 2022-04-19 RX ADMIN — SODIUM CHLORIDE, SODIUM LACTATE, POTASSIUM CHLORIDE, AND CALCIUM CHLORIDE: .6; .31; .03; .02 INJECTION, SOLUTION INTRAVENOUS at 09:26

## 2022-04-19 RX ADMIN — PROPOFOL 150 MCG/KG/MIN: 10 INJECTION, EMULSION INTRAVENOUS at 09:39

## 2022-04-19 RX ADMIN — FENTANYL CITRATE 25 MCG: 50 INJECTION INTRAMUSCULAR; INTRAVENOUS at 09:39

## 2022-04-19 RX ADMIN — LIDOCAINE HYDROCHLORIDE 70 MG: 10 INJECTION, SOLUTION EPIDURAL; INFILTRATION; INTRACAUDAL; PERINEURAL at 09:39

## 2022-04-19 NOTE — ANESTHESIA POSTPROCEDURE EVALUATION
Post-Op Assessment Note    CV Status:  Stable    Pain management: adequate     Mental Status:  Alert and awake   Hydration Status:  Euvolemic   PONV Controlled:  Controlled   Airway Patency:  Patent      Post Op Vitals Reviewed: Yes      Staff: CRNA         No complications documented      BP   113/73   Temp 97 3   Pulse 75   Resp 16   SpO2 100% room air

## 2022-04-19 NOTE — ANESTHESIA PREPROCEDURE EVALUATION
Procedure:  EGD    Relevant Problems   ANESTHESIA (within normal limits)      CARDIO   (+) Essential hypertension   (+) Hypertriglyceridemia      ENDO (within normal limits)      GI/HEPATIC   (+) Gastroesophageal reflux disease without esophagitis      /RENAL (within normal limits)      GYN   (+) History of hysterectomy      MUSCULOSKELETAL   (+) DDD (degenerative disc disease), lumbosacral   (+) Inflammatory polyarthropathy (HCC)      NEURO/PSYCH   (+) Chronic headaches   (+) Chronic pelvic pain in female      PULMONARY   (+) Asthma      Other   (+) Crohn's disease of small intestine with rectal bleeding ([address] )     EKG 12/2021:  NORMAL SINUS RHYTHM   NORMAL ECG  TTE 11/2020:  Normal LV size and function  LVEF 60-65%  Normal diastolic function  Normal RV size and function  Normal atrial size  No significant valvular dysfunction  No pericardial effusion  Lab Results   Component Value Date    WBC 7 69 01/28/2021    HGB 12 3 01/28/2021    HCT 38 1 01/28/2021    MCV 92 01/28/2021     01/28/2021     Lab Results   Component Value Date    SODIUM 143 01/28/2021    K 3 8 01/28/2021     (H) 01/28/2021    CO2 22 01/28/2021    BUN 9 01/28/2021    CREATININE 0 86 01/28/2021    CALCIUM 9 3 01/28/2021     No results found for: INR, PROTIME  Lab Results   Component Value Date    HGBA1C 5 4 07/16/2019          Physical Exam    Airway    Mallampati score: II  TM Distance: >3 FB  Neck ROM: full     Dental   No notable dental hx     Cardiovascular  Cardiovascular exam normal    Pulmonary  Pulmonary exam normal     Other Findings        Anesthesia Plan  ASA Score- 2     Anesthesia Type- IV sedation with anesthesia with ASA Monitors  Additional Monitors:   Airway Plan:           Plan Factors-Exercise tolerance (METS): >4 METS  Chart reviewed  EKG reviewed  Existing labs reviewed  Patient summary reviewed  Induction- intravenous      Postoperative Plan-     Informed Consent- Anesthetic plan and risks discussed with patient  I personally reviewed this patient with the CRNA  Discussed and agreed on the Anesthesia Plan with the CRNA  Maria Esther Aviles

## 2022-04-19 NOTE — H&P
History and Physical - [facility]  Georgiana Cabrera 39 y o  female MRN: [id number]    HPI: Georgiana Cabrera is a 39y o  year old female who presents for EGD due to dyspepsia  She also has a history of Croh'ns disease maintained on stelara  REVIEW OF SYSTEMS: Per the HPI, and otherwise unremarkable  Historical Information   Past Medical History:   Diagnosis Date    [facility] )     Crohn's disease of small intestine with rectal bleeding ([facility] )     DDD (degenerative disc disease), lumbosacral 2018    Essential hypertension 2019    Gastroesophageal reflux disease without esophagitis 2018    Pneumonia     Sickle cell trait (HCC)     Urinary tract infection     Visual impairment      Past Surgical History:   Procedure Laterality Date     SECTION      CHOLECYSTECTOMY      COLONOSCOPY      HYSTERECTOMY      LIPOSUCTION      DC COLONOSCOPY FLX DX W/COLLJ SPEC WHEN PFRMD N/A 2018    Procedure: EGD AND COLONOSCOPY;  Surgeon: Randy Reed MD;  Location: [facility];   Service: Gastroenterology    SMALL INTESTINE SURGERY      TONSILLECTOMY      UPPER GASTROINTESTINAL ENDOSCOPY       Social History   Social History     Substance and Sexual Activity   Alcohol Use Yes    Comment: Occassionally     Social History     Substance and Sexual Activity   Drug Use No     Social History     Tobacco Use   Smoking Status Never Smoker   Smokeless Tobacco Never Used     Family History   Problem Relation Age of Onset    Hepatitis Mother     Thyroid disease Mother     Diabetes Father     Hypertension Father     Hyperlipidemia Father     Cataracts Father     Colon cancer Neg Hx      Meds/Allergies     Current Outpatient Medications:     DULoxetine (CYMBALTA) 30 mg delayed release capsule    pantoprazole (PROTONIX) 40 mg tablet    topiramate (TOPAMAX) 100 mg tablet    acetaminophen (TYLENOL) 500 mg tablet    albuterol (PROVENTIL HFA,VENTOLIN HFA) 90 mcg/act inhaler    amitriptyline (ELAVIL) 10 mg tablet    cetirizine (ZyrTEC) 10 mg tablet    cetirizine (ZyrTEC) 10 mg tablet    Cholecalciferol 1 25 MG (47363 UT) capsule    cyclobenzaprine (FLEXERIL) 10 mg tablet    cyclobenzaprine (FLEXERIL) 5 mg tablet    dicyclomine (BENTYL) 20 mg tablet    diphenoxylate-atropine (LOMOTIL) 2 5-0 025 mg per tablet    docusate sodium (COLACE) 100 mg capsule    ergocalciferol (VITAMIN D2) 50,000 units    famotidine (PEPCID) 40 MG tablet    fluticasone (FLONASE) 50 mcg/act nasal spray    levocetirizine (XYZAL) 5 MG tablet    meclizine (ANTIVERT) 25 mg tablet    metoclopramide (REGLAN) 5 mg tablet    ondansetron (ZOFRAN) 4 mg tablet    traMADol (ULTRAM) 50 mg tablet    ustekinumab (Stelara) 90 mg/mL subcutaneous injection    Allergies   Allergen Reactions    Aspirin Other (See Comments)     "I was a baby, I have no idea"    Morphine Itching     Objective     /74   Pulse 72   Temp 98 °F (36 7 °C) (Tympanic)   Resp 18   LMP 04/28/2018   SpO2 100%     PHYSICAL EXAM    Gen: NAD  Head: NCAT  CV: RRR  CHEST: Clear  ABD: soft, NT/ND  EXT: no edema    ASSESSMENT/PLAN:  This is a 39y o  year old female here for EGD, and she is stable and optimized for her procedure

## 2022-05-25 DIAGNOSIS — E66.3 OVERWEIGHT: ICD-10-CM

## 2022-05-25 DIAGNOSIS — R51.9 CHRONIC HEADACHES: ICD-10-CM

## 2022-05-25 DIAGNOSIS — G89.29 CHRONIC HEADACHES: ICD-10-CM

## 2022-05-25 RX ORDER — TOPIRAMATE 100 MG/1
100 TABLET, FILM COATED ORAL 2 TIMES DAILY
Qty: 180 TABLET | Refills: 0 | Status: SHIPPED | OUTPATIENT
Start: 2022-05-25

## 2022-06-07 DIAGNOSIS — K58.0 IRRITABLE BOWEL SYNDROME WITH DIARRHEA: ICD-10-CM

## 2022-06-07 DIAGNOSIS — R10.84 GENERALIZED ABDOMINAL PAIN: ICD-10-CM

## 2022-06-07 DIAGNOSIS — R10.13 EPIGASTRIC PAIN: ICD-10-CM

## 2022-06-07 DIAGNOSIS — K50.011 CROHN'S DISEASE OF SMALL INTESTINE WITH RECTAL BLEEDING (HCC): ICD-10-CM

## 2022-06-07 RX ORDER — DICYCLOMINE HCL 20 MG
20 TABLET ORAL EVERY 8 HOURS
Qty: 90 TABLET | Refills: 0 | Status: SHIPPED | OUTPATIENT
Start: 2022-06-07 | End: 2022-06-20

## 2022-06-07 RX ORDER — FAMOTIDINE 40 MG/1
40 TABLET, FILM COATED ORAL 2 TIMES DAILY PRN
Qty: 60 TABLET | Refills: 0 | Status: SHIPPED | OUTPATIENT
Start: 2022-06-07 | End: 2022-06-20

## 2022-06-20 DIAGNOSIS — R10.13 EPIGASTRIC PAIN: ICD-10-CM

## 2022-06-20 DIAGNOSIS — K58.0 IRRITABLE BOWEL SYNDROME WITH DIARRHEA: ICD-10-CM

## 2022-06-20 DIAGNOSIS — R10.84 GENERALIZED ABDOMINAL PAIN: ICD-10-CM

## 2022-06-20 DIAGNOSIS — K50.011 CROHN'S DISEASE OF SMALL INTESTINE WITH RECTAL BLEEDING (HCC): ICD-10-CM

## 2022-06-20 RX ORDER — DICYCLOMINE HCL 20 MG
TABLET ORAL
Qty: 270 TABLET | Refills: 1 | Status: SHIPPED | OUTPATIENT
Start: 2022-06-20

## 2022-06-20 RX ORDER — FAMOTIDINE 40 MG/1
40 TABLET, FILM COATED ORAL 2 TIMES DAILY PRN
Qty: 180 TABLET | Refills: 1 | Status: SHIPPED | OUTPATIENT
Start: 2022-06-20

## 2022-07-12 ENCOUNTER — TELEPHONE (OUTPATIENT)
Dept: GASTROENTEROLOGY | Facility: CLINIC | Age: 42
End: 2022-07-12

## 2022-07-12 NOTE — TELEPHONE ENCOUNTER
Called and spoke with pt regarding MyChart message  Pt will go to the lab tomorrow to  materials for stool studies  No additional questions/concerns at this time

## 2022-09-15 ENCOUNTER — TELEPHONE (OUTPATIENT)
Dept: GASTROENTEROLOGY | Facility: CLINIC | Age: 42
End: 2022-09-15

## 2022-09-15 ENCOUNTER — APPOINTMENT (EMERGENCY)
Dept: CT IMAGING | Facility: HOSPITAL | Age: 42
End: 2022-09-15
Payer: COMMERCIAL

## 2022-09-15 ENCOUNTER — HOSPITAL ENCOUNTER (EMERGENCY)
Facility: HOSPITAL | Age: 42
Discharge: HOME | End: 2022-09-15
Attending: EMERGENCY MEDICINE
Payer: COMMERCIAL

## 2022-09-15 VITALS
SYSTOLIC BLOOD PRESSURE: 116 MMHG | HEART RATE: 70 BPM | BODY MASS INDEX: 30.88 KG/M2 | TEMPERATURE: 98.1 F | OXYGEN SATURATION: 99 % | RESPIRATION RATE: 16 BRPM | WEIGHT: 179.9 LBS | DIASTOLIC BLOOD PRESSURE: 70 MMHG

## 2022-09-15 DIAGNOSIS — K52.9 COLITIS: ICD-10-CM

## 2022-09-15 DIAGNOSIS — R10.9 ABDOMINAL PAIN: Primary | ICD-10-CM

## 2022-09-15 LAB
ALBUMIN SERPL BCP-MCNC: 3.6 G/DL (ref 3.5–5)
ALP SERPL-CCNC: 108 U/L (ref 46–116)
ALT SERPL W P-5'-P-CCNC: 54 U/L (ref 12–78)
ANION GAP SERPL CALCULATED.3IONS-SCNC: 11 MMOL/L (ref 4–13)
AST SERPL W P-5'-P-CCNC: 24 U/L (ref 5–45)
BASOPHILS # BLD AUTO: 0.08 THOUSANDS/ΜL (ref 0–0.1)
BASOPHILS NFR BLD AUTO: 1 % (ref 0–1)
BILIRUB DIRECT SERPL-MCNC: 0.07 MG/DL (ref 0–0.2)
BILIRUB SERPL-MCNC: 0.23 MG/DL (ref 0.2–1)
BILIRUB UR QL STRIP: NEGATIVE
BUN SERPL-MCNC: 8 MG/DL (ref 5–25)
CALCIUM SERPL-MCNC: 8.9 MG/DL (ref 8.3–10.1)
CHLORIDE SERPL-SCNC: 109 MMOL/L (ref 96–108)
CLARITY UR: CLEAR
CO2 SERPL-SCNC: 24 MMOL/L (ref 21–32)
COLOR UR: YELLOW
CREAT SERPL-MCNC: 0.83 MG/DL (ref 0.6–1.3)
EOSINOPHIL # BLD AUTO: 0.17 THOUSAND/ΜL (ref 0–0.61)
EOSINOPHIL NFR BLD AUTO: 2 % (ref 0–6)
ERYTHROCYTE [DISTWIDTH] IN BLOOD BY AUTOMATED COUNT: 13.3 % (ref 11.6–15.1)
GFR SERPL CREATININE-BSD FRML MDRD: 87 ML/MIN/1.73SQ M
GLUCOSE SERPL-MCNC: 95 MG/DL (ref 65–140)
GLUCOSE UR STRIP-MCNC: NEGATIVE MG/DL
HCT VFR BLD AUTO: 40 % (ref 34.8–46.1)
HGB BLD-MCNC: 13.1 G/DL (ref 11.5–15.4)
HGB UR QL STRIP.AUTO: NEGATIVE
IMM GRANULOCYTES # BLD AUTO: 0.02 THOUSAND/UL (ref 0–0.2)
IMM GRANULOCYTES NFR BLD AUTO: 0 % (ref 0–2)
KETONES UR STRIP-MCNC: NEGATIVE MG/DL
LEUKOCYTE ESTERASE UR QL STRIP: NEGATIVE
LIPASE SERPL-CCNC: 167 U/L (ref 73–393)
LYMPHOCYTES # BLD AUTO: 3.56 THOUSANDS/ΜL (ref 0.6–4.47)
LYMPHOCYTES NFR BLD AUTO: 37 % (ref 14–44)
MAGNESIUM SERPL-MCNC: 2 MG/DL (ref 1.6–2.6)
MCH RBC QN AUTO: 29.3 PG (ref 26.8–34.3)
MCHC RBC AUTO-ENTMCNC: 32.8 G/DL (ref 31.4–37.4)
MCV RBC AUTO: 90 FL (ref 82–98)
MONOCYTES # BLD AUTO: 0.55 THOUSAND/ΜL (ref 0.17–1.22)
MONOCYTES NFR BLD AUTO: 6 % (ref 4–12)
NEUTROPHILS # BLD AUTO: 5.13 THOUSANDS/ΜL (ref 1.85–7.62)
NEUTS SEG NFR BLD AUTO: 54 % (ref 43–75)
NITRITE UR QL STRIP: NEGATIVE
NRBC BLD AUTO-RTO: 0 /100 WBCS
PH UR STRIP.AUTO: 5.5 [PH] (ref 4.5–8)
PLATELET # BLD AUTO: 264 THOUSANDS/UL (ref 149–390)
PMV BLD AUTO: 9.9 FL (ref 8.9–12.7)
POTASSIUM SERPL-SCNC: 3.6 MMOL/L (ref 3.5–5.3)
PROT SERPL-MCNC: 7.7 G/DL (ref 6.4–8.4)
PROT UR STRIP-MCNC: NEGATIVE MG/DL
RBC # BLD AUTO: 4.47 MILLION/UL (ref 3.81–5.12)
SODIUM SERPL-SCNC: 144 MMOL/L (ref 135–147)
SP GR UR STRIP.AUTO: 1.02 (ref 1–1.03)
UROBILINOGEN UR QL STRIP.AUTO: 0.2 E.U./DL
WBC # BLD AUTO: 9.51 THOUSAND/UL (ref 4.31–10.16)

## 2022-09-15 PROCEDURE — 96361 HYDRATE IV INFUSION ADD-ON: CPT

## 2022-09-15 PROCEDURE — 74177 CT ABD & PELVIS W/CONTRAST: CPT

## 2022-09-15 PROCEDURE — 36415 COLL VENOUS BLD VENIPUNCTURE: CPT | Performed by: EMERGENCY MEDICINE

## 2022-09-15 PROCEDURE — 85025 COMPLETE CBC W/AUTO DIFF WBC: CPT | Performed by: EMERGENCY MEDICINE

## 2022-09-15 PROCEDURE — 99284 EMERGENCY DEPT VISIT MOD MDM: CPT

## 2022-09-15 PROCEDURE — G1004 CDSM NDSC: HCPCS

## 2022-09-15 PROCEDURE — 83735 ASSAY OF MAGNESIUM: CPT | Performed by: EMERGENCY MEDICINE

## 2022-09-15 PROCEDURE — 83690 ASSAY OF LIPASE: CPT | Performed by: EMERGENCY MEDICINE

## 2022-09-15 PROCEDURE — 99284 EMERGENCY DEPT VISIT MOD MDM: CPT | Performed by: EMERGENCY MEDICINE

## 2022-09-15 PROCEDURE — 96374 THER/PROPH/DIAG INJ IV PUSH: CPT

## 2022-09-15 PROCEDURE — 80076 HEPATIC FUNCTION PANEL: CPT | Performed by: EMERGENCY MEDICINE

## 2022-09-15 PROCEDURE — 80048 BASIC METABOLIC PNL TOTAL CA: CPT | Performed by: EMERGENCY MEDICINE

## 2022-09-15 PROCEDURE — 81003 URINALYSIS AUTO W/O SCOPE: CPT

## 2022-09-15 RX ORDER — KETOROLAC TROMETHAMINE 30 MG/ML
15 INJECTION, SOLUTION INTRAMUSCULAR; INTRAVENOUS ONCE
Status: COMPLETED | OUTPATIENT
Start: 2022-09-15 | End: 2022-09-15

## 2022-09-15 RX ORDER — NAPROXEN 500 MG/1
500 TABLET ORAL 2 TIMES DAILY WITH MEALS
Qty: 20 TABLET | Refills: 0 | Status: SHIPPED | OUTPATIENT
Start: 2022-09-15 | End: 2022-09-25

## 2022-09-15 RX ORDER — PREDNISONE 20 MG/1
20 TABLET ORAL DAILY
Qty: 15 TABLET | Refills: 0 | Status: SHIPPED | OUTPATIENT
Start: 2022-09-15 | End: 2022-09-20 | Stop reason: CLARIF

## 2022-09-15 RX ADMIN — KETOROLAC TROMETHAMINE 15 MG: 30 INJECTION, SOLUTION INTRAMUSCULAR; INTRAVENOUS at 21:02

## 2022-09-15 RX ADMIN — IOHEXOL 100 ML: 350 INJECTION, SOLUTION INTRAVENOUS at 20:41

## 2022-09-15 RX ADMIN — SODIUM CHLORIDE 1000 ML: 0.9 INJECTION, SOLUTION INTRAVENOUS at 19:12

## 2022-09-15 RX ADMIN — HYOSCYAMINE SULFATE 0.25 MG: 0.12 TABLET SUBLINGUAL at 19:11

## 2022-09-15 NOTE — TELEPHONE ENCOUNTER
MARTINA: Spoke with patient  History of IBS-C, GERD, crohns, epigastric pain    Patient c/o upper abdominal pain radiating to her right side for 5 days, +nausea, +chills, +weakness, +dizziness  She states she has not been eating well  Taking pepcid 40mg BID, bentyl 20mg BID  She feels she should go to ED for evaluation, and is on the way there now  Patient would not provide any further information  Advised this is okay

## 2022-09-15 NOTE — TELEPHONE ENCOUNTER
Patients GI provider:  Dr Mervat Lombardi    Number to return call: [phone]    Reason for call: Pt calling stating she is experiencing a lot of abd pain and cramping  She feels dizzy and fatigued  Dicyclomine is no longer helping and she feels really bad  Scheduled procedure/appointment date if applicable: Appt   9/20/22

## 2022-09-15 NOTE — ED PROVIDER NOTES
History  Chief Complaint   Patient presents with    Abdominal Pain     Pt reports thinking she has a crohn's flare up  Pt reports diarrheah and nausea x2 weeks  Pt also reports bloating and dizziness  44 YO female with Hx of Crohn's presents with abdominal pain  Pt states pain has been primarily in the RUQ, constant since starting 2 weeks prior  She had Hx of similar, states she had her gallbladder removed at the age of 23 for similar pain but it has continued  Pt has had diarrhea which has been present previously, she has been trying Bentyl for her pain and diarrhea, states this often helps but today she has had continued pain and diarrhea despite taking this medication  Pt follow with GI, states she has an appointment coming up but, on calling and explaining her symptoms, she was instructed to present to the ED for evaluation  Pt denies CP/SOB/F/C, no dysuria, burning on urination or blood in urine  History provided by:  Patient   used: No    Abdominal Pain  Pain location:  RUQ  Pain quality: aching and sharp    Pain radiates to:  Does not radiate  Pain severity:  Moderate  Onset quality:  Gradual  Timing:  Constant  Progression:  Unchanged  Chronicity:  Recurrent  Relieved by:  Nothing  Worsened by:  Nothing  Ineffective treatments: Bentyl  Associated symptoms: diarrhea and nausea    Associated symptoms: no chest pain, no chills, no dysuria, no fatigue, no fever, no shortness of breath and no vomiting        Prior to Admission Medications   Prescriptions Last Dose Informant Patient Reported? Taking?    Cholecalciferol 1 25 MG (16735 UT) capsule   Yes No   Sig: Take 1 capsule by mouth once a week   DULoxetine (CYMBALTA) 30 mg delayed release capsule  Self Yes No   Sig: Take 30 mg by mouth daily   acetaminophen (TYLENOL) 500 mg tablet  Self Yes No   Sig: TAKE TWO TABLETS BY MOUTH EVERY 8 HOURS AS NEEDED FOR MILD PAIN   albuterol (PROVENTIL HFA,VENTOLIN HFA) 90 mcg/act inhaler  Self Yes No   Sig: Inhale 1 puff daily   amitriptyline (ELAVIL) 10 mg tablet  Self Yes No   Sig: Take 10 mg by mouth   cetirizine (ZyrTEC) 10 mg tablet  Self Yes No   Sig: Take 10 mg by mouth   cetirizine (ZyrTEC) 10 mg tablet  Self Yes No   Sig: Take 10 mg by mouth   cyclobenzaprine (FLEXERIL) 10 mg tablet  Self Yes No   Sig: Take 10 mg by mouth Three times daily as needed   cyclobenzaprine (FLEXERIL) 5 mg tablet  Self Yes No   dicyclomine (BENTYL) 20 mg tablet   No No   Sig: TAKE 1 TABLET BY MOUTH EVERY 8 HOURS     diphenoxylate-atropine (LOMOTIL) 2 5-0 025 mg per tablet  Self No No   Sig: Take 1 tablet by mouth 4 (four) times a day as needed for diarrhea   docusate sodium (COLACE) 100 mg capsule  Self No No   Sig: Take 1 capsule (100 mg total) by mouth 2 (two) times a day   ergocalciferol (VITAMIN D2) 50,000 units  Self Yes No   Sig: Take 50,000 Units by mouth once a week   famotidine (PEPCID) 40 MG tablet   No No   Sig: TAKE 1 TABLET (40 MG TOTAL) BY MOUTH 2 (TWO) TIMES A DAY AS NEEDED FOR INDIGESTION OR HEARTBURN   fluticasone (FLONASE) 50 mcg/act nasal spray  Self Yes No   Si sprays into each nostril daily   levocetirizine (XYZAL) 5 MG tablet  Self Yes No   Sig: Take 5 mg by mouth every evening   meclizine (ANTIVERT) 25 mg tablet  Self Yes No   metoclopramide (REGLAN) 5 mg tablet  Self Yes No   Sig: Take 5 mg by mouth 4 (four) times a day as needed   ondansetron (ZOFRAN) 4 mg tablet  Self No No   Sig: Take 1 tablet (4 mg total) by mouth every 8 (eight) hours as needed for nausea or vomiting   pantoprazole (PROTONIX) 40 mg tablet   Yes No   Sig: Take 40 mg by mouth daily   topiramate (TOPAMAX) 100 mg tablet   No No   Sig: Take 1 tablet (100 mg total) by mouth 2 (two) times a day   traMADol (ULTRAM) 50 mg tablet  Self Yes No   Sig: Take 50 mg by mouth every 6 (six) hours as needed   ustekinumab (Stelara) 90 mg/mL subcutaneous injection   No No   Sig: Inject 1 mL (90 mg total) under the skin every 56 days Facility-Administered Medications: None       Past Medical History:   Diagnosis Date    Crohn disease ([facility] [address] )     Crohn's disease of small intestine with rectal bleeding (HCC)     DDD (degenerative disc disease), lumbosacral 2018    Essential hypertension 2019    Gastroesophageal reflux disease without esophagitis 2018    Pneumonia     Sickle cell trait (HCC)     Urinary tract infection     Visual impairment        Past Surgical History:   Procedure Laterality Date     SECTION      CHOLECYSTECTOMY      COLONOSCOPY      HYSTERECTOMY      LIPOSUCTION      DC COLONOSCOPY FLX DX W/COLLJ SPEC WHEN PFRMD N/A 2018    Procedure: EGD AND COLONOSCOPY;  Surgeon: Sammie Hamilton MD;  Location: [facility]; Service: Gastroenterology    SMALL INTESTINE SURGERY      TONSILLECTOMY      UPPER GASTROINTESTINAL ENDOSCOPY         Family History   Problem Relation Age of Onset    Hepatitis Mother     Thyroid disease Mother     Diabetes Father     Hypertension Father     Hyperlipidemia Father     Cataracts Father     Colon cancer Neg Hx      I have reviewed and agree with the history as documented  E-Cigarette/Vaping    E-Cigarette Use Never User      E-Cigarette/Vaping Substances     Social History     Tobacco Use    Smoking status: Never Smoker    Smokeless tobacco: Never Used   Vaping Use    Vaping Use: Never used   Substance Use Topics    Alcohol use: Yes     Comment: Occassionally    Drug use: No       Review of Systems   Constitutional: Negative for chills, fatigue and fever  HENT: Negative for dental problem  Eyes: Negative for visual disturbance  Respiratory: Negative for shortness of breath  Cardiovascular: Negative for chest pain  Gastrointestinal: Positive for abdominal pain, diarrhea and nausea  Negative for vomiting  Genitourinary: Negative for dysuria and frequency  Musculoskeletal: Negative for arthralgias  Skin: Negative for rash  Neurological: Negative for dizziness, weakness and light-headedness  Psychiatric/Behavioral: Negative for agitation, behavioral problems and confusion  All other systems reviewed and are negative  Physical Exam  Physical Exam  Vitals and nursing note reviewed  Constitutional:       Appearance: Normal appearance  HENT:      Head: Normocephalic and atraumatic  Eyes:      Extraocular Movements: Extraocular movements intact  Conjunctiva/sclera: Conjunctivae normal    Cardiovascular:      Rate and Rhythm: Normal rate  Pulmonary:      Effort: Pulmonary effort is normal    Abdominal:      General: There is no distension  Tenderness: There is generalized abdominal tenderness  Musculoskeletal:         General: Normal range of motion  Cervical back: Normal range of motion  Skin:     Findings: No rash  Neurological:      General: No focal deficit present  Mental Status: She is alert  Cranial Nerves: No cranial nerve deficit     Psychiatric:         Mood and Affect: Mood normal          Vital Signs  ED Triage Vitals [09/15/22 1809]   Temperature Pulse Respirations Blood Pressure SpO2   98 1 °F (36 7 °C) 70 16 126/70 100 %      Temp Source Heart Rate Source Patient Position - Orthostatic VS BP Location FiO2 (%)   Oral Monitor Sitting Right arm --      Pain Score       9           Vitals:    09/15/22 1809 09/15/22 2007   BP: 126/70 116/70   Pulse: 70 70   Patient Position - Orthostatic VS: Sitting Lying         Visual Acuity      ED Medications  Medications   sodium chloride 0 9 % bolus 1,000 mL (0 mL Intravenous Stopped 9/15/22 2047)   hyoscyamine (LEVSIN/SL) SL tablet 0 25 mg (0 25 mg Sublingual Given 9/15/22 1911)   iohexol (OMNIPAQUE) 350 MG/ML injection (SINGLE-DOSE) 100 mL (100 mL Intravenous Given 9/15/22 2041)   ketorolac (TORADOL) injection 15 mg (15 mg Intravenous Given 9/15/22 2102)       Diagnostic Studies  Results Reviewed     Procedure Component Value Units Date/Time Lipase [[id number]]  (Normal) Collected: 09/15/22 1910    Lab Status: Final result Specimen: Blood from Arm, Left Updated: 09/15/22 1958     Lipase 167 u/L     Hepatic function panel [[id number]]  (Normal) Collected: 09/15/22 1910    Lab Status: Final result Specimen: Blood from Arm, Left Updated: 09/15/22 1958     Total Bilirubin 0 23 mg/dL      Bilirubin, Direct 0 07 mg/dL      Alkaline Phosphatase 108 U/L      AST 24 U/L      ALT 54 U/L      Total Protein 7 7 g/dL      Albumin 3 6 g/dL     Magnesium [[id number]]  (Normal) Collected: 09/15/22 1910    Lab Status: Final result Specimen: Blood from Arm, Left Updated: 09/15/22 1958     Magnesium 2 0 mg/dL     Basic metabolic panel [[id number]]  (Abnormal) Collected: 09/15/22 1910    Lab Status: Final result Specimen: Blood from Arm, Left Updated: 09/15/22 1957     Sodium 144 mmol/L      Potassium 3 6 mmol/L      Chloride 109 mmol/L      CO2 24 mmol/L      ANION GAP 11 mmol/L      BUN 8 mg/dL      Creatinine 0 83 mg/dL      Glucose 95 mg/dL      Calcium 8 9 mg/dL      eGFR 87 ml/min/1 73sq m     Narrative:      [place] guidelines for Chronic Kidney Disease (CKD):     Stage 1 with normal or high GFR (GFR > 90 mL/min/1 73 square meters)    Stage 2 Mild CKD (GFR = 60-89 mL/min/1 73 square meters)    Stage 3A Moderate CKD (GFR = 45-59 mL/min/1 73 square meters)    Stage 3B Moderate CKD (GFR = 30-44 mL/min/1 73 square meters)    Stage 4 Severe CKD (GFR = 15-29 mL/min/1 73 square meters)    Stage 5 End Stage CKD (GFR <15 mL/min/1 73 square meters)  Note: GFR calculation is accurate only with a steady state creatinine    CBC and differential [[id number]] Collected: 09/15/22 1910    Lab Status: Final result Specimen: Blood from Arm, Left Updated: 09/15/22 1922     WBC 9 51 Thousand/uL      RBC 4 47 Million/uL      Hemoglobin 13 1 g/dL      Hematocrit 40 0 %      MCV 90 fL      MCH 29 3 pg      MCHC 32 8 g/dL      RDW 13 3 %      MPV 9 9 fL Platelets 230 Thousands/uL      nRBC 0 /100 WBCs      Neutrophils Relative 54 %      Immat GRANS % 0 %      Lymphocytes Relative 37 %      Monocytes Relative 6 %      Eosinophils Relative 2 %      Basophils Relative 1 %      Neutrophils Absolute 5 13 Thousands/µL      Immature Grans Absolute 0 02 Thousand/uL      Lymphocytes Absolute 3 56 Thousands/µL      Monocytes Absolute 0 55 Thousand/µL      Eosinophils Absolute 0 17 Thousand/µL      Basophils Absolute 0 08 Thousands/µL     Urine Macroscopic, POC [[id number]] Collected: 09/15/22 1915    Lab Status: Final result Specimen: Urine Updated: 09/15/22 1916     Color, UA Yellow     Clarity, UA Clear     pH, UA 5 5     Leukocytes, UA Negative     Nitrite, UA Negative     Protein, UA Negative mg/dl      Glucose, UA Negative mg/dl      Ketones, UA Negative mg/dl      Urobilinogen, UA 0 2 E U /dl      Bilirubin, UA Negative     Occult Blood, UA Negative     Specific Gravity, UA 1 020    Narrative:      CLINITEK RESULT                 CT abdomen pelvis with contrast   Final Result by Marilyn Cuba MD (09/15 2138)      1  Hyperemic terminal ileum and colon which could reflect mild inflammatory changes from known Crohn's disease  2   Otherwise no acute findings in the abdomen or pelvis  The study was marked in EPIC for significant notification  Workstation performed: [id number]                    Procedures  Procedures         ED Course                                             MDM  Number of Diagnoses or Management Options  Abdominal pain: new and requires workup  Colitis: new and requires workup  Diagnosis management comments: 1  Abdominal pain - Pt appears to have similar episodes for the last ~20 years, now with worsening pain despite taking Bentyl  Will check urine for infection, CBC for leukocytosis, metabolic panel for electrolyte abnormalities and dehydration,  LFT's to assess GB dysfunction, lipase for pancreatitis, CT abdomen and pelvis  Will give fluids, try Levsin  Amount and/or Complexity of Data Reviewed  Clinical lab tests: ordered and reviewed  Tests in the radiology section of CPT®: ordered and reviewed  Review and summarize past medical records: yes  Independent visualization of images, tracings, or specimens: yes    Patient Progress  Patient progress: stable      Disposition  Final diagnoses:   Abdominal pain   Colitis     Time reflects when diagnosis was documented in both MDM as applicable and the Disposition within this note     Time User Action Codes Description Comment    9/15/2022  9:50 PM Audrey OROZCO Add [R10 9] Abdominal pain     9/15/2022  9:50 PM Audrey Cook [K52 9] Colitis       ED Disposition     ED Disposition   Discharge    Condition   Stable    Date/Time   Thu Sep 15, 2022  9:50 PM    Comment   Brian Juarez discharge to home/self care                 Follow-up Information     Follow up With Specialties Details Why [address] [place] [address], DO Gastroenterology   [address] [place] [address]   [phone]            Discharge Medication List as of 9/15/2022  9:52 PM      START taking these medications    Details   naproxen (NAPROSYN) 500 mg tablet Take 1 tablet (500 mg total) by mouth 2 (two) times a day with meals for 10 days, Starting u 9/15/2022, Until Sun 9/25/2022, Normal      predniSONE 20 mg tablet Take 1 tablet (20 mg total) by mouth daily, Starting Thu 9/15/2022, Normal         CONTINUE these medications which have NOT CHANGED    Details   acetaminophen (TYLENOL) 500 mg tablet TAKE TWO TABLETS BY MOUTH EVERY 8 HOURS AS NEEDED FOR MILD PAIN, Historical Med      albuterol (PROVENTIL HFA,VENTOLIN HFA) 90 mcg/act inhaler Inhale 1 puff daily, Starting Wed 4/28/2021, Historical Med      amitriptyline (ELAVIL) 10 mg tablet Take 10 mg by mouth, Starting Tue 7/16/2019, Historical Med      cetirizine (ZyrTEC) 10 mg tablet Take 10 mg by mouth, Starting Fri 7/20/2018, Historical Med      Cholecalciferol 1 25 MG (33777 UT) capsule Take 1 capsule by mouth once a week, Starting Tue 11/9/2021, Until Tue 1/4/2022, Historical Med      !! cyclobenzaprine (FLEXERIL) 10 mg tablet Take 10 mg by mouth Three times daily as needed, Starting Tue 7/16/2019, Historical Med      !! cyclobenzaprine (FLEXERIL) 5 mg tablet Starting Wed 7/14/2021, Historical Med      dicyclomine (BENTYL) 20 mg tablet TAKE 1 TABLET BY MOUTH EVERY 8 HOURS , Normal      diphenoxylate-atropine (LOMOTIL) 2 5-0 025 mg per tablet Take 1 tablet by mouth 4 (four) times a day as needed for diarrhea, Starting Fri 6/5/2020, Print      docusate sodium (COLACE) 100 mg capsule Take 1 capsule (100 mg total) by mouth 2 (two) times a day, Starting Tue 11/12/2019, Normal      DULoxetine (CYMBALTA) 30 mg delayed release capsule Take 30 mg by mouth daily, Starting Thu 11/4/2021, Historical Med      ergocalciferol (VITAMIN D2) 50,000 units Take 50,000 Units by mouth once a week, Starting Tue 11/9/2021, Historical Med      famotidine (PEPCID) 40 MG tablet TAKE 1 TABLET (40 MG TOTAL) BY MOUTH 2 (TWO) TIMES A DAY AS NEEDED FOR INDIGESTION OR HEARTBURN, Starting Mon 6/20/2022, Normal      fluticasone (FLONASE) 50 mcg/act nasal spray 2 sprays into each nostril daily, Starting Tue 6/9/2020, Until Thu 10/21/2021, Historical Med      levocetirizine (XYZAL) 5 MG tablet Take 5 mg by mouth every evening, Starting Thu 5/14/2020, Historical Med      meclizine (ANTIVERT) 25 mg tablet Starting Mon 10/11/2021, Historical Med      metoclopramide (REGLAN) 5 mg tablet Take 5 mg by mouth 4 (four) times a day as needed, Starting Fri 5/28/2021, Historical Med      ondansetron (ZOFRAN) 4 mg tablet Take 1 tablet (4 mg total) by mouth every 8 (eight) hours as needed for nausea or vomiting, Starting Wed 1/27/2021, Normal      pantoprazole (PROTONIX) 40 mg tablet Take 40 mg by mouth daily, Historical Med      topiramate (TOPAMAX) 100 mg tablet Take 1 tablet (100 mg total) by mouth 2 (two) times a day, Starting Wed 5/25/2022, Normal      traMADol (ULTRAM) 50 mg tablet Take 50 mg by mouth every 6 (six) hours as needed, Starting Tue 9/21/2021, Until Wed 9/21/2022 at 2359, Historical Med      ustekinumab (Stelara) 90 mg/mL subcutaneous injection Inject 1 mL (90 mg total) under the skin every 56 days, Starting Tue 3/15/2022, Normal       !! - Potential duplicate medications found  Please discuss with provider  No discharge procedures on file      PDMP Review     None          ED Provider  Electronically Signed by           Mariely Evans MD  09/18/22 4467

## 2022-09-16 ENCOUNTER — APPOINTMENT (OUTPATIENT)
Dept: LAB | Facility: HOSPITAL | Age: 42
End: 2022-09-16
Payer: COMMERCIAL

## 2022-09-16 ENCOUNTER — TELEPHONE (OUTPATIENT)
Dept: GASTROENTEROLOGY | Facility: AMBULARY SURGERY CENTER | Age: 42
End: 2022-09-16

## 2022-09-16 DIAGNOSIS — K50.011 CROHN'S DISEASE OF SMALL INTESTINE WITH RECTAL BLEEDING (HCC): ICD-10-CM

## 2022-09-16 DIAGNOSIS — K50.011 CROHN'S DISEASE OF SMALL INTESTINE WITH RECTAL BLEEDING (HCC): Primary | ICD-10-CM

## 2022-09-16 DIAGNOSIS — R19.7 DIARRHEA OF PRESUMED INFECTIOUS ORIGIN: ICD-10-CM

## 2022-09-16 LAB — CRP SERPL QL: 3.7 MG/L

## 2022-09-16 PROCEDURE — 87209 SMEAR COMPLEX STAIN: CPT

## 2022-09-16 PROCEDURE — 87177 OVA AND PARASITES SMEARS: CPT

## 2022-09-16 PROCEDURE — 87329 GIARDIA AG IA: CPT

## 2022-09-16 PROCEDURE — 83993 ASSAY FOR CALPROTECTIN FECAL: CPT

## 2022-09-16 PROCEDURE — 86140 C-REACTIVE PROTEIN: CPT

## 2022-09-16 PROCEDURE — 36415 COLL VENOUS BLD VENIPUNCTURE: CPT

## 2022-09-16 PROCEDURE — 87493 C DIFF AMPLIFIED PROBE: CPT

## 2022-09-16 PROCEDURE — 87505 NFCT AGENT DETECTION GI: CPT

## 2022-09-16 NOTE — TELEPHONE ENCOUNTER
----- Message from Mercedez Krishnan PA-C sent at 9/16/2022  8:34 AM EDT -----  Regarding: FW: ER medication   Please have the patient obtain infectious stool studies as ordered, I will also add CRP and fecal calprotectin  If her c diff comes back negative over the weekend and she has continue diarrhea and abdominal pain she can start the prednisone for a suspected Crohn's flare      ----- Message -----  From: Luz Maria Peña RN  Sent: 9/16/2022   8:20 AM EDT  To: , #  Subject: FW: ER medication                                  ----- Message -----  From: Chanelle Cordero  Sent: 9/16/2022   8:17 AM EDT  To: , #  Subject: ER medication                                    The ER doctor give   Prednisone  And I told me to contact my DR   First   And ask if she agreed with the medication or she want me to take Louisa Guaman thing else

## 2022-09-16 NOTE — DISCHARGE INSTRUCTIONS
Speak with Dr Romaine White office tomorrow, let them know you were in the ER and had testing done, that you were sent home with a prescription for steroids but were told to hold off on taking them until Dr Bell Griffith agrees  If she is ok with you starting the steroids take 3 daily until you follow up in the office with her next week  Take the Naprosyn twice daily for the next 5-10 days  Return to the ER for worsening symptoms

## 2022-09-17 LAB
C DIFF TOX GENS STL QL NAA+PROBE: NEGATIVE
CAMPYLOBACTER DNA SPEC NAA+PROBE: NORMAL
G LAMBLIA AG STL QL IA: NEGATIVE
SALMONELLA DNA SPEC QL NAA+PROBE: NORMAL
SHIGA TOXIN STX GENE SPEC NAA+PROBE: NORMAL
SHIGELLA DNA SPEC QL NAA+PROBE: NORMAL

## 2022-09-20 ENCOUNTER — OFFICE VISIT (OUTPATIENT)
Dept: GASTROENTEROLOGY | Facility: CLINIC | Age: 42
End: 2022-09-20
Payer: COMMERCIAL

## 2022-09-20 VITALS
DIASTOLIC BLOOD PRESSURE: 72 MMHG | BODY MASS INDEX: 30.16 KG/M2 | HEIGHT: 65 IN | HEART RATE: 77 BPM | TEMPERATURE: 98.6 F | SYSTOLIC BLOOD PRESSURE: 118 MMHG | WEIGHT: 181 LBS

## 2022-09-20 DIAGNOSIS — K58.1 IRRITABLE BOWEL SYNDROME WITH CONSTIPATION: ICD-10-CM

## 2022-09-20 DIAGNOSIS — K50.011 CROHN'S DISEASE OF SMALL INTESTINE WITH RECTAL BLEEDING (HCC): Primary | ICD-10-CM

## 2022-09-20 DIAGNOSIS — K21.9 GASTROESOPHAGEAL REFLUX DISEASE WITHOUT ESOPHAGITIS: ICD-10-CM

## 2022-09-20 DIAGNOSIS — K21.9 GASTROESOPHAGEAL REFLUX DISEASE WITHOUT ESOPHAGITIS: Primary | ICD-10-CM

## 2022-09-20 PROCEDURE — 99214 OFFICE O/P EST MOD 30 MIN: CPT | Performed by: INTERNAL MEDICINE

## 2022-09-20 RX ORDER — SODIUM PICOSULFATE, MAGNESIUM OXIDE, AND ANHYDROUS CITRIC ACID 10; 3.5; 12 MG/160ML; G/160ML; G/160ML
1 LIQUID ORAL ONCE
Qty: 160 ML | Refills: 0 | Status: SHIPPED | OUTPATIENT
Start: 2022-09-20 | End: 2022-09-20

## 2022-09-20 NOTE — PROGRESS NOTES
[facility] - Outpatient Follow-up Note  Katlin Powell 43 y o  female MRN: [id number]  Encounter: [id number]          ASSESSMENT AND PLAN:  66-year-old female here for follow-up  1  Crohn's disease of small intestine with rectal bleeding ([facility] [address] )  -her CRP is better that him it has been in the past, will check fecal calprotectin  -will repeat colonoscopy  -continue Stelara    2  Irritable bowel syndrome with constipation  -trial of low FODMAP diet    3  Gastroesophageal reflux disease without esophagitis    Follow up in a few months time      ______________________________________________________________________    SUBJECTIVE:  66-year-old female here for follow-up  She was recently in the ER for abdominal pain  She had a colonoscopy in   Tells me when she gets the pain she cleans her house to try to get the pain at of her head  This is not feel like her Crohn's disease  She thinks it might be her irritable bowel syndrome  REVIEW OF SYSTEMS IS OTHERWISE NEGATIVE  Historical Information   Past Medical History:   Diagnosis Date    [facility]     Crohn's disease of small intestine with rectal bleeding ([facility] [address] [facility]     DDD (degenerative disc disease), lumbosacral 2018    Essential hypertension 2019    Gastroesophageal reflux disease without esophagitis 2018    Pneumonia     Sickle cell trait (HCC)     Urinary tract infection     Visual impairment      Past Surgical History:   Procedure Laterality Date     SECTION      CHOLECYSTECTOMY      COLONOSCOPY      HYSTERECTOMY      LIPOSUCTION      DC COLONOSCOPY FLX DX W/COLLJ SPEC WHEN PFRMD N/A 2018    Procedure: EGD AND COLONOSCOPY;  Surgeon: Joel Garcia MD;  Location: [facility] GI LAB;   Service: Gastroenterology    SMALL INTESTINE SURGERY      TONSILLECTOMY      UPPER GASTROINTESTINAL ENDOSCOPY       Social History   Social History     Substance and Sexual Activity   Alcohol Use Yes    Comment: Occassionally     Social History     Substance and Sexual Activity   Drug Use No     Social History     Tobacco Use   Smoking Status Never Smoker   Smokeless Tobacco Never Used     Family History   Problem Relation Age of Onset    Hepatitis Mother     Thyroid disease Mother     Diabetes Father     Hypertension Father     Hyperlipidemia Father     Cataracts Father     Colon cancer Neg Hx        Meds/Allergies       Current Outpatient Medications:     acetaminophen (TYLENOL) 500 mg tablet    albuterol (PROVENTIL HFA,VENTOLIN HFA) 90 mcg/act inhaler    amitriptyline (ELAVIL) 10 mg tablet    cetirizine (ZyrTEC) 10 mg tablet    cetirizine (ZyrTEC) 10 mg tablet    Cholecalciferol 1 25 MG (71447 UT) capsule    cyclobenzaprine (FLEXERIL) 10 mg tablet    cyclobenzaprine (FLEXERIL) 5 mg tablet    dicyclomine (BENTYL) 20 mg tablet    diphenoxylate-atropine (LOMOTIL) 2 5-0 025 mg per tablet    docusate sodium (COLACE) 100 mg capsule    DULoxetine (CYMBALTA) 30 mg delayed release capsule    ergocalciferol (VITAMIN D2) 50,000 units    famotidine (PEPCID) 40 MG tablet    fluticasone (FLONASE) 50 mcg/act nasal spray    levocetirizine (XYZAL) 5 MG tablet    meclizine (ANTIVERT) 25 mg tablet    metoclopramide (REGLAN) 5 mg tablet    naproxen (NAPROSYN) 500 mg tablet    ondansetron (ZOFRAN) 4 mg tablet    pantoprazole (PROTONIX) 40 mg tablet    predniSONE 20 mg tablet    topiramate (TOPAMAX) 100 mg tablet    traMADol (ULTRAM) 50 mg tablet    ustekinumab (Stelara) 90 mg/mL subcutaneous injection    Allergies   Allergen Reactions    Aspirin Other (See Comments)     "I was a baby, I have no idea"    Morphine Itching           Objective     Blood pressure 118/72, pulse 77, temperature 98 6 °F (37 °C), temperature source Tympanic, height 5' 5" (1 651 m), weight 82 1 kg (181 lb), last menstrual period 04/28/2018, not currently breastfeeding  Body mass index is 30 12 kg/m²        PHYSICAL EXAM:      General Appearance:   Alert, cooperative, no distress   HEENT:   Normocephalic, atraumatic, anicteric      Neck:  Supple, symmetrical, trachea midline   Lungs:   Clear to auscultation bilaterally; no rales, rhonchi or wheezing; respirations unlabored    Heart[de-identified]   Regular rate and rhythm; no murmur, rub, or gallop  Abdomen:   Soft, non-tender, non-distended; normal bowel sounds; no masses, no organomegaly    Genitalia:   Deferred    Rectal:   Deferred    Extremities:  No cyanosis, clubbing or edema    Pulses:  2+ and symmetric    Skin:  No jaundice, rashes, or lesions    Lymph nodes:  No palpable cervical lymphadenopathy        Lab Results:   No visits with results within 1 Day(s) from this visit  Latest known visit with results is:   Appointment on 09/16/2022   Component Date Value    Salmonella sp PCR 09/16/2022 None Detected     Shigella sp/Enteroinvasi* 09/16/2022 None Detected     Campylobacter sp (jejuni* 09/16/2022 None Detected     Shiga toxin 1/Shiga toxi* 09/16/2022 None Detected     C difficile toxin by PCR 09/16/2022 Negative     Giardia Ag, Stl 09/16/2022 Negative          Radiology Results:   CT abdomen pelvis with contrast    Result Date: 9/15/2022  Narrative: CT ABDOMEN AND PELVIS WITH IV CONTRAST INDICATION:   Abdominal pain, acute, nonlocalized RUQ pain COMPARISON:  CT enterography 6/14/2018  TECHNIQUE:  CT examination of the abdomen and pelvis was performed  Axial, sagittal, and coronal 2D reformatted images were created from the source data and submitted for interpretation  Radiation dose length product (DLP) for this visit:  439 mGy-cm   This examination, like all CT scans performed in the [facility], was performed utilizing techniques to minimize radiation dose exposure, including the use of iterative reconstruction and automated exposure control  IV Contrast:  100 mL of iohexol (OMNIPAQUE) Enteric Contrast:  Enteric contrast was not administered   FINDINGS: ABDOMEN LOWER CHEST: No consolidation or effusion  Small hiatal hernia  LIVER: Normal size and morphology  No suspicious lesion  BILIARY: No intrahepatic biliary ductal dilatation  Normal caliber common bile duct  GALLBLADDER: Gallbladder is surgically absent  SPLEEN: Within normal limits  No suspicious lesion  Normal spleen size  PANCREAS: Pancreatic parenchyma is within normal limits  No main pancreatic ductal dilatation  No peripancreatic inflammation  ADRENAL GLANDS: Within normal limits  KIDNEYS/URETERS: Normal size and position  Symmetric enhancement  No suspicious lesion  Cortical cyst in the interpolar right kidney  No calcified stones or hydronephrosis  Ureters within normal limits  STOMACH AND BOWEL: Stomach is grossly within normal limits  Normal caliber small bowel  Normal caliber large bowel  Small bowel-small bowel anastomosis in the right pelvis  The terminal ileum and the colon are mildly hyperemic without wall thickening  APPENDIX: Normal appendix (series 2/55)  ABDOMINOPELVIC CAVITY: No ascites  No intraperitoneal free air  No lymphadenopathy  No retroperitoneal hematoma  VESSELS: Normal caliber abdominal aorta with no detectable atherosclerotic plaque  PELVIS REPRODUCTIVE ORGANS:  Hysterectomy  No evidence of pelvic or adnexal mass  URINARY BLADDER:  Within normal limits  No calculi  ABDOMINAL WALL/INGUINAL REGIONS:  Tiny fat-containing umbilical hernia  Postsurgical changes in the lower midline anterior abdominal wall; there is closely apposed small bowel suggesting adhesions without evidence of obstruction  BONES:  Vertebral body height is maintained  No acute fracture or destructive osseous lesion  Impression: 1  Hyperemic terminal ileum and colon which could reflect mild inflammatory changes from known Crohn's disease  2   Otherwise no acute findings in the abdomen or pelvis  The study was marked in EPIC for significant notification   Workstation performed: [id number]   Answers for HPI/ROS submitted by the patient on 9/20/2022  When you are not experiencing symptoms of your inflammatory bowel disease, how many bowel movements do you typically have each day?: 5  Over the last 3 days, what is the maximum number of bowel movements that you had in a single day?: 4  Over the last 3 days, have you had any bowel movements where you passed blood without stool?: Yes  Since your last visit, have you received any vaccinations?: No  Since the last visit, have you had an infection?: No  Is there any possibility that you may be pregnant?: No  In the past three months, have you used tobacco in any form?: No  During the last year, how many days have you missed work or school because of your inflammatory bowel disease?: 2  During the last year, how many days have you been hospitalized because of your inflammatory bowel disease?: 0  During the last year, how many days have you visited a hospital emergency department because of your inflammatory bowel disease?: 2  During the last month, have you taken narcotic pain medications (such as Percocet, oxycodone, Oxycontin, morphine, Vicodin, Dilaudid, MS Contin) for your inflammatory bowel disease?: No  Have you awoken at night because you needed to move your bowels during the last month? : Yes  Have you had leakage of stool while sleeping during the last month?: Yes  Have you had leakage of stool while you were awake during the last month?: Yes  In the last 6 months, have you unintentionally lost weight?: No  Fever: No  Eye irritation: Yes  Mouth sores: Yes  Sore throat: No  Chest pain: No  Shortness of breath: No  Numbness or tingling in your hands or feet: Yes  Skin rash: Yes  During the last 3 days, have you had pain or swelling in your joints?: Yes  Bruising or bleeding: No  Felt depressed or blue:  Yes

## 2022-09-20 NOTE — PATIENT INSTRUCTIONS
Scheduled date of colonoscopy (as of today):12 05 22  Physician performing colonoscopy:DR CACERES  Location of colonoscopy:[facility]  Bowel prep reviewed with patient:[email or web address]  Instructions reviewed with patient by:JAX  Clearances:  N/A

## 2022-09-21 LAB — CALPROTECTIN STL-MCNT: <16 UG/G (ref 0–120)

## 2022-10-04 ENCOUNTER — TELEPHONE (OUTPATIENT)
Dept: OTHER | Facility: OTHER | Age: 42
End: 2022-10-04

## 2022-10-04 DIAGNOSIS — K50.10 CROHN'S DISEASE OF COLON WITHOUT COMPLICATION (HCC): ICD-10-CM

## 2022-10-04 NOTE — TELEPHONE ENCOUNTER
Pt  Has crohns and is having an issue today and she would like to speak to a nurse or PA  Pt  Is requesting a call back

## 2022-10-10 RX ORDER — USTEKINUMAB 90 MG/ML
90 INJECTION, SOLUTION SUBCUTANEOUS
Qty: 1 ML | Refills: 6 | Status: SHIPPED | OUTPATIENT
Start: 2022-10-10 | End: 2022-10-13 | Stop reason: SDUPTHER

## 2022-10-10 NOTE — TELEPHONE ENCOUNTER
Patient insurance will not cover mouthwash pt has a 121 00 out of pocket cost  I tried submitted on cover my meds, comes back to check coverage's/not covered    I called and spoke with pt advised this is not covered pt verbalized understanding

## 2022-10-10 NOTE — TELEPHONE ENCOUNTER
Spoke with pt who explained last week she developed diarrhea and had one episode of fecal incontinence  She stated she has had urgency in the past but never incontinence  Pt is due for stelera injection this week  I will send refill  Her stool is no longer watery and is now soft  Pt also has abdominal pain which I advised continuation of bentyl and low fodmap diet  Pt agreeable  Pt had more questions regarding her crohns and possibly getting accommodations at her job  It is best she has OV  She has been scheduled with one of our Pas next week  Lastly pt mentioned not getting coverage for the magic mouthwash  I will look into further

## 2022-10-10 NOTE — TELEPHONE ENCOUNTER
I called pharmacy , they ran First Mouthwash coming back with 121 00 cost     I will submit auth to see if we can get it covered

## 2022-10-10 NOTE — TELEPHONE ENCOUNTER
Cover my meds does not recognize compounded drugs , to submit for auth      Can we send script for First Mouthwash ?        Please advise

## 2022-10-13 DIAGNOSIS — K50.10 CROHN'S DISEASE OF COLON WITHOUT COMPLICATION (HCC): ICD-10-CM

## 2022-10-13 RX ORDER — USTEKINUMAB 90 MG/ML
90 INJECTION, SOLUTION SUBCUTANEOUS
Qty: 1 ML | Refills: 0 | Status: SHIPPED | OUTPATIENT
Start: 2022-10-13

## 2022-10-18 ENCOUNTER — OFFICE VISIT (OUTPATIENT)
Dept: GASTROENTEROLOGY | Facility: CLINIC | Age: 42
End: 2022-10-18
Payer: COMMERCIAL

## 2022-10-18 VITALS
TEMPERATURE: 97.6 F | SYSTOLIC BLOOD PRESSURE: 122 MMHG | HEIGHT: 65 IN | DIASTOLIC BLOOD PRESSURE: 70 MMHG | WEIGHT: 180.6 LBS | BODY MASS INDEX: 30.09 KG/M2

## 2022-10-18 DIAGNOSIS — K21.9 GASTROESOPHAGEAL REFLUX DISEASE WITHOUT ESOPHAGITIS: ICD-10-CM

## 2022-10-18 DIAGNOSIS — K58.9 IRRITABLE BOWEL SYNDROME, UNSPECIFIED TYPE: ICD-10-CM

## 2022-10-18 DIAGNOSIS — K50.011 CROHN'S DISEASE OF SMALL INTESTINE WITH RECTAL BLEEDING (HCC): Primary | ICD-10-CM

## 2022-10-18 PROCEDURE — 99214 OFFICE O/P EST MOD 30 MIN: CPT | Performed by: PHYSICIAN ASSISTANT

## 2022-10-18 RX ORDER — PANTOPRAZOLE SODIUM 40 MG/1
40 TABLET, DELAYED RELEASE ORAL
Qty: 180 TABLET | Refills: 1 | Status: SHIPPED | OUTPATIENT
Start: 2022-10-18 | End: 2022-10-18

## 2022-10-18 RX ORDER — FAMOTIDINE 40 MG/1
40 TABLET, FILM COATED ORAL
Qty: 90 TABLET | Refills: 1 | Status: SHIPPED | OUTPATIENT
Start: 2022-10-18

## 2022-10-18 NOTE — PROGRESS NOTES
[facility] - Outpatient Follow-up Note  Chato Rosales 43 y o  female MRN: [id number]  Encounter: [id number]          ASSESSMENT AND PLAN:      1  Crohn's disease of small intestine with rectal bleeding ([address] )  Currently maintained on Stelara every 8 weeks  Most recent colonoscopy from August 2020 showed normal TI and entire colon confirming histologic remission  MRI enterography from March 2021 showed no active disease  Recent fecal calprotectin normal and CRP only mildly elevated 3 7  Her abdominal pain and diarrhea are likely due to superimposed IBS, not active Crohn's disease, but we will further assess with colonoscopy as scheduled in December  Plan to check Stelara drug and antibody levels 1 day prior to her next injection given that her symptoms rapidly improve following the injection but worsen as time goes on    - MISCELLANEOUS LAB TEST; Future  - polyethylene glycol (GOLYTELY) 4000 mL solution; Take 4,000 mL by mouth once for 1 dose  Dispense: 4000 mL; Refill: 0    2  Gastroesophageal reflux disease without esophagitis  Stable, continue pantoprazole 40 mg twice daily before meals and Pepcid at bedtime  Recent EGD from April 2022 showed normal esophagus with no upper GI tract involvement of Crohn's disease  Reflux diet and lifestyle precautions    - pantoprazole (PROTONIX) 40 mg tablet; Take 1 tablet (40 mg total) by mouth 2 (two) times a day before meals  Dispense: 180 tablet; Refill: 1  - famotidine (PEPCID) 40 MG tablet; Take 1 tablet (40 mg total) by mouth daily at bedtime  Dispense: 90 tablet; Refill: 1    3   IBS, unspecified  We suspect her chronic abdominal pain and diarrhea are due to IBS  She seems to tolerate Bentyl but reports side effects of drowsiness from amitriptyline and Cymbalta  I would avoid these TCA/SNRI medications since she works as a   She may continue Bentyl as needed and trial IBgard   Low FODMAP diet    Follow-up in 3 months  ______________________________________________________________________    SUBJECTIVE:  70-year-old female with Crohn's disease and GERD presenting for follow-up  She has been on Stelara for maintenance of Crohn's  She receives injections every 8 weeks  She feels great the 1st 2 weeks after her injection, but develops progressive symptoms afterward including abdominal pain and diarrhea multiple times per day  Sometimes she has episodes of fecal incontinence  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  She has right lower quadrant crampy pain which is somewhat alleviated with Bentyl  She tried amitriptyline and Cymbalta but this causes severe fatigue and drowsiness      Answers for HPI/ROS submitted by the patient on 10/18/2022  When you are not experiencing symptoms of your inflammatory bowel disease, how many bowel movements do you typically have each day?: 6 or more  Over the last 3 days, what is the maximum number of bowel movements that you had in a single day?: 3  Over the last 3 days, have you had any bowel movements where you passed blood without stool?: No  Since your last visit, have you received any vaccinations?: No  Since the last visit, have you had an infection?: No  During the last month, have you taken narcotic pain medications (such as Percocet, oxycodone, Oxycontin, morphine, Vicodin, Dilaudid, MS Contin) for your inflammatory bowel disease?: No  Have you awoken at night because you needed to move your bowels during the last month? : Yes  Have you had leakage of stool while sleeping during the last month?: Yes  Have you had leakage of stool while you were awake during the last month?: Yes  In the last 6 months, have you unintentionally lost weight?: No  Fever: No  Eye irritation: No  Mouth sores: Yes  Sore throat: Yes  Chest pain: No  Shortness of breath: No  Numbness or tingling in your hands or feet: Yes  Skin rash: Yes  Pain or swelling in your joints: Yes  Bruising or bleeding: No  Felt depressed or blue: Yes        REVIEW OF SYSTEMS IS OTHERWISE NEGATIVE  Historical Information   Past Medical History:   Diagnosis Date   • Crohn disease ([facility] [address] )    • [facility]    • DDD (degenerative disc disease), lumbosacral 2018   • Essential hypertension 2019   • Gastroesophageal reflux disease without esophagitis 2018   • Pneumonia    • Sickle cell trait (HCC)    • Urinary tract infection    • Visual impairment      Past Surgical History:   Procedure Laterality Date   •  SECTION     • CHOLECYSTECTOMY     • COLONOSCOPY     • HYSTERECTOMY     • LIPOSUCTION     • [place] COLONOSCOPY FLX DX W/COLLJ SPEC WHEN PFRMD N/A 2018    Procedure: EGD AND COLONOSCOPY;  Surgeon: Jelena Bergeron MD;  Location: [facility] GI LAB;   Service: Gastroenterology   • SMALL INTESTINE SURGERY     • TONSILLECTOMY     • UPPER GASTROINTESTINAL ENDOSCOPY       Social History   Social History     Substance and Sexual Activity   Alcohol Use Yes    Comment: Occassionally     Social History     Substance and Sexual Activity   Drug Use No     Social History     Tobacco Use   Smoking Status Never Smoker   Smokeless Tobacco Never Used     Family History   Problem Relation Age of Onset   • Hepatitis Mother    • Thyroid disease Mother    • Diabetes Father    • Hypertension Father    • Hyperlipidemia Father    • Cataracts Father    • Colon cancer Neg Hx        Meds/Allergies       Current Outpatient Medications:   •  acetaminophen (TYLENOL) 500 mg tablet  •  albuterol (PROVENTIL HFA,VENTOLIN HFA) 90 mcg/act inhaler  •  amitriptyline (ELAVIL) 10 mg tablet  •  cetirizine (ZyrTEC) 10 mg tablet  •  cyclobenzaprine (FLEXERIL) 10 mg tablet  •  cyclobenzaprine (FLEXERIL) 5 mg tablet  •  dicyclomine (BENTYL) 20 mg tablet  •  DPH-Lido-AlHydr-MgHydr-Simeth (First-Mouthwash BLM) SUSP  •  DULoxetine (CYMBALTA) 30 mg delayed release capsule  •  ergocalciferol (VITAMIN D2) 50,000 units  •  famotidine (PEPCID) 40 MG tablet  •  levocetirizine (XYZAL) 5 MG tablet  •  meclizine (ANTIVERT) 25 mg tablet  •  ondansetron (ZOFRAN) 4 mg tablet  •  pantoprazole (PROTONIX) 40 mg tablet  •  polyethylene glycol (GOLYTELY) 4000 mL solution  •  topiramate (TOPAMAX) 100 mg tablet  •  ustekinumab (Stelara) 90 mg/mL subcutaneous injection  •  cetirizine (ZyrTEC) 10 mg tablet  •  Cholecalciferol 1 25 MG (48134 UT) capsule  •  fluticasone (FLONASE) 50 mcg/act nasal spray  •  naproxen (NAPROSYN) 500 mg tablet    Allergies   Allergen Reactions   • Aspirin Other (See Comments)     "I was a baby, I have no idea"   • Morphine Itching           Objective     Blood pressure 122/70, temperature 97 6 °F (36 4 °C), temperature source Tympanic, height 5' 5" (1 651 m), weight 81 9 kg (180 lb 9 6 oz), last menstrual period 04/28/2018, not currently breastfeeding  Body mass index is 30 05 kg/m²  PHYSICAL EXAM:      General Appearance:   Alert, cooperative, no distress   HEENT:   Normocephalic, atraumatic, anicteric      Neck:  Supple, symmetrical, trachea midline   Lungs:   Clear to auscultation bilaterally; no rales, rhonchi or wheezing; respirations unlabored    Heart[de-identified]   Regular rate and rhythm; no murmur, rub, or gallop  Abdomen:   Soft, generalized tenderness, non-distended; normal bowel sounds; no masses, no organomegaly    Genitalia:   Deferred    Rectal:   Deferred    Extremities:  No cyanosis, clubbing or edema    Pulses:  2+ and symmetric    Skin:  No jaundice, rashes, or lesions    Lymph nodes:  No palpable cervical lymphadenopathy        Lab Results:   No visits with results within 1 Day(s) from this visit     Latest known visit with results is:   Appointment on 09/16/2022   Component Date Value   • Salmonella sp PCR 09/16/2022 None Detected    • Shigella sp/Enteroinvasi* 09/16/2022 None Detected    • Campylobacter sp (jejuni* 09/16/2022 None Detected    • Shiga toxin 1/Shiga toxi* 09/16/2022 None Detected    • C difficile toxin by PCR 09/16/2022 Negative    • Giardia Ag, Stl 09/16/2022 Negative    • Calprotectin 09/16/2022 <16          Radiology Results:   No results found

## 2022-10-18 NOTE — LETTER
October 18, 2022     Octavia Vaca DO  [address] [employer]    Patient: Alexa Fulton   YOB: 1980   Date of Visit: 10/18/2022       Dear Dr Kirstin Saavedra: Thank you for referring Alexa Fulton to me for evaluation  Below are my notes for this consultation  If you have questions, please do not hesitate to call me  I look forward to following your patient along with you  Sincerely,        Víctor Ponce PA-C        CC: No Recipients  Víctor Ponce PA-C  10/18/2022  5:17 PM  Sign when Signing Visit  [facility] - Outpatient Follow-up Note  Alexa Fulton 43 y o  female MRN: [id number]  Encounter: [id number]          ASSESSMENT AND PLAN:      1  Crohn's disease of small intestine with rectal bleeding ([address] )  Currently maintained on Stelara every 8 weeks  Most recent colonoscopy from August 2020 showed normal TI and entire colon confirming histologic remission  MRI enterography from March 2021 showed no active disease  Recent fecal calprotectin normal and CRP only mildly elevated 3 7  Her abdominal pain and diarrhea are likely due to superimposed IBS, not active Crohn's disease, but we will further assess with colonoscopy as scheduled in December  Plan to check Stelara drug and antibody levels 1 day prior to her next injection given that her symptoms rapidly improve following the injection but worsen as time goes on    - MISCELLANEOUS LAB TEST; Future  - polyethylene glycol (GOLYTELY) 4000 mL solution; Take 4,000 mL by mouth once for 1 dose  Dispense: 4000 mL; Refill: 0    2  Gastroesophageal reflux disease without esophagitis  Stable, continue pantoprazole 40 mg twice daily before meals and Pepcid at bedtime  Recent EGD from April 2022 showed normal esophagus with no upper GI tract involvement of Crohn's disease  Reflux diet and lifestyle precautions    - pantoprazole (PROTONIX) 40 mg tablet;  Take 1 tablet (40 mg total) by mouth 2 (two) times a day before meals  Dispense: 180 tablet; Refill: 1  - famotidine (PEPCID) 40 MG tablet; Take 1 tablet (40 mg total) by mouth daily at bedtime  Dispense: 90 tablet; Refill: 1    3  IBS, unspecified  We suspect her chronic abdominal pain and diarrhea are due to IBS  She seems to tolerate Bentyl but reports side effects of drowsiness from amitriptyline and Cymbalta  I would avoid these TCA/SNRI medications since she works as a   She may continue Bentyl as needed and trial IBgard   Low FODMAP diet    Follow-up in 3 months  ______________________________________________________________________    SUBJECTIVE:  51-year-old female with Crohn's disease and GERD presenting for follow-up  She has been on Stelara for maintenance of Crohn's  She receives injections every 8 weeks  She feels great the 1st 2 weeks after her injection, but develops progressive symptoms afterward including abdominal pain and diarrhea multiple times per day  Sometimes she has episodes of fecal incontinence  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  She has right lower quadrant crampy pain which is somewhat alleviated with Bentyl  She tried amitriptyline and Cymbalta but this causes severe fatigue and drowsiness      Answers for HPI/ROS submitted by the patient on 10/18/2022  When you are not experiencing symptoms of your inflammatory bowel disease, how many bowel movements do you typically have each day?: 6 or more  Over the last 3 days, what is the maximum number of bowel movements that you had in a single day?: 3  Over the last 3 days, have you had any bowel movements where you passed blood without stool?: No  Since your last visit, have you received any vaccinations?: No  Since the last visit, have you had an infection?: No  During the last month, have you taken narcotic pain medications (such as Percocet, oxycodone, Oxycontin, morphine, Vicodin, Dilaudid, MS Contin) for your inflammatory bowel disease?: No  Have you awoken at night because you needed to move your bowels during the last month? : Yes  Have you had leakage of stool while sleeping during the last month?: Yes  Have you had leakage of stool while you were awake during the last month?: Yes  In the last 6 months, have you unintentionally lost weight?: No  Fever: No  Eye irritation: No  Mouth sores: Yes  Sore throat: Yes  Chest pain: No  Shortness of breath: No  Numbness or tingling in your hands or feet: Yes  Skin rash: Yes  Pain or swelling in your joints: Yes  Bruising or bleeding: No  Felt depressed or blue: Yes        REVIEW OF SYSTEMS IS OTHERWISE NEGATIVE  Historical Information   Past Medical History:   Diagnosis Date   • Crohn disease ([facility] )    • Crohn's disease of small intestine with rectal bleeding ([facility] )    • DDD (degenerative disc disease), lumbosacral 2018   • Essential hypertension 2019   • Gastroesophageal reflux disease without esophagitis 2018   • Pneumonia    • Sickle cell trait (HCC)    • Urinary tract infection    • Visual impairment      Past Surgical History:   Procedure Laterality Date   •  SECTION     • CHOLECYSTECTOMY     • COLONOSCOPY     • HYSTERECTOMY     • LIPOSUCTION     • OR COLONOSCOPY FLX DX W/COLLJ SPEC WHEN PFRMD N/A 2018    Procedure: EGD AND COLONOSCOPY;  Surgeon: Mandie Hurst MD;  Location: [facility];   Service: Gastroenterology   • SMALL INTESTINE SURGERY     • TONSILLECTOMY     • UPPER GASTROINTESTINAL ENDOSCOPY       Social History   Social History     Substance and Sexual Activity   Alcohol Use Yes    Comment: Occassionally     Social History     Substance and Sexual Activity   Drug Use No     Social History     Tobacco Use   Smoking Status Never Smoker   Smokeless Tobacco Never Used     Family History   Problem Relation Age of Onset   • Hepatitis Mother    • Thyroid disease Mother    • Diabetes Father    • Hypertension Father    • Hyperlipidemia Father    • Cataracts Father    • Colon cancer Neg Hx        Meds/Allergies       Current Outpatient Medications:   •  acetaminophen (TYLENOL) 500 mg tablet  •  albuterol (PROVENTIL HFA,VENTOLIN HFA) 90 mcg/act inhaler  •  amitriptyline (ELAVIL) 10 mg tablet  •  cetirizine (ZyrTEC) 10 mg tablet  •  cyclobenzaprine (FLEXERIL) 10 mg tablet  •  cyclobenzaprine (FLEXERIL) 5 mg tablet  •  dicyclomine (BENTYL) 20 mg tablet  •  DPH-Lido-AlHydr-MgHydr-Simeth (First-Mouthwash BLM) SUSP  •  DULoxetine (CYMBALTA) 30 mg delayed release capsule  •  ergocalciferol (VITAMIN D2) 50,000 units  •  famotidine (PEPCID) 40 MG tablet  •  levocetirizine (XYZAL) 5 MG tablet  •  meclizine (ANTIVERT) 25 mg tablet  •  ondansetron (ZOFRAN) 4 mg tablet  •  pantoprazole (PROTONIX) 40 mg tablet  •  polyethylene glycol (GOLYTELY) 4000 mL solution  •  topiramate (TOPAMAX) 100 mg tablet  •  ustekinumab (Stelara) 90 mg/mL subcutaneous injection  •  cetirizine (ZyrTEC) 10 mg tablet  •  Cholecalciferol 1 25 MG (94358 UT) capsule  •  fluticasone (FLONASE) 50 mcg/act nasal spray  •  naproxen (NAPROSYN) 500 mg tablet    Allergies   Allergen Reactions   • Aspirin Other (See Comments)     "I was a baby, I have no idea"   • Morphine Itching           Objective     Blood pressure 122/70, temperature 97 6 °F (36 4 °C), temperature source Tympanic, height 5' 5" (1 651 m), weight 81 9 kg (180 lb 9 6 oz), last menstrual period 04/28/2018, not currently breastfeeding  Body mass index is 30 05 kg/m²  PHYSICAL EXAM:      General Appearance:   Alert, cooperative, no distress   HEENT:   Normocephalic, atraumatic, anicteric      Neck:  Supple, symmetrical, trachea midline   Lungs:   Clear to auscultation bilaterally; no rales, rhonchi or wheezing; respirations unlabored    Heart[de-identified]   Regular rate and rhythm; no murmur, rub, or gallop     Abdomen:   Soft, generalized tenderness, non-distended; normal bowel sounds; no masses, no organomegaly    Genitalia:   Deferred    Rectal:   Deferred    Extremities:  No cyanosis, clubbing or edema    Pulses:  2+ and symmetric    Skin:  No jaundice, rashes, or lesions    Lymph nodes:  No palpable cervical lymphadenopathy        Lab Results:   No visits with results within 1 Day(s) from this visit  Latest known visit with results is:   Appointment on 09/16/2022   Component Date Value   • Salmonella sp PCR 09/16/2022 None Detected    • Shigella sp/Enteroinvasi* 09/16/2022 None Detected    • Campylobacter sp (jejuni* 09/16/2022 None Detected    • Shiga toxin 1/Shiga toxi* 09/16/2022 None Detected    • C difficile toxin by PCR 09/16/2022 Negative    • Giardia Ag, Stl 09/16/2022 Negative    • Calprotectin 09/16/2022 <16          Radiology Results:   No results found

## 2022-11-01 ENCOUNTER — TELEPHONE (OUTPATIENT)
Dept: BARIATRICS | Facility: CLINIC | Age: 42
End: 2022-11-01

## 2022-11-01 NOTE — TELEPHONE ENCOUNTER
Pt rescheduled her appt for later this month, but will not have enough medication to last   She is looking to get enough called in to the pharmacy to get her through to her next appt on the 25th

## 2023-06-19 NOTE — TELEPHONE ENCOUNTER
I called and spoke with Sherry José, advised pathology is not in as of yet , we will call patient once we receive results  [Negative] : Heme/Lymph [Fatigue] : fatigue [Fever] : no fever [Chills] : no chills [Joint Pain] : no joint pain [Muscle Pain] : no muscle pain [Muscle Weakness] : no muscle weakness [Back Pain] : no back pain [Joint Swelling] : no joint swelling

## 2024-03-04 NOTE — PROGRESS NOTES
Assessment/Plan:    No problem-specific Assessment & Plan notes found for this encounter  Diagnoses and all orders for this visit:    Obesity, Class I, BMI 30-34 9  -     topiramate (TOPAMAX) 50 MG tablet; Take 1 tab in AM and 2 tabs in the early afternoon after lunch  Essential hypertension  -     topiramate (TOPAMAX) 50 MG tablet; Take 1 tab in AM and 2 tabs in the early afternoon after lunch  Gastroesophageal reflux disease without esophagitis    Crohn's disease of small intestine with rectal bleeding (HCC)    Inflammatory polyarthropathy (HCC)    Hypertriglyceridemia    Chronic headaches  -     topiramate (TOPAMAX) 50 MG tablet; Take 1 tab in AM and 2 tabs in the early afternoon after lunch  -Patient is pursuing Conservative Program  -Initial weight loss goal of 5-10% weight loss for improved health  -Screening labs-UTD  -Pt had hysterectomy early 2019  neg pregnacy test 10/16/19   - on Topamax for weight loss, she does suffer from occasional headaches related to stress and it can help also  Hope that it could also help counteract the weight gaining effects of some of her current medicines (elavil, flexeril)  tolerated Topamax without side effects and note wt loss  Will increase further to 150mg a day, titration plan explained  She will take the afternoon dose early after lunch       AVOID Ulis Spruce as she has history of pancreatitis  Also avoid phentermine and metformin due to GI side effects and her issues with GI sxs/Crohns/IBS w constipation and diarrhea       - food login (will use judie) and also encourage to start sxs log in the food log   - Cont with 1-2 MR a day  She tolerated it well (lactose free)     Initial: 190 9lbs  Current: 189lbs  Change: -2 3lbs  Goal: 140lbs     Goals:  Food log (ie ) www myfitnesspal com,sparkpeople  com,loseit com,calorieking  com,etc  baritastic  No sugary beverages  At least 64oz of water daily  Increase physical activity by 10 minutes daily   Gradually Patient here today for nurse blood pressure check.    BP Readings from Last 1 Encounters:   02/11/21 1500 128/76     Pulse Readings from Last 1 Encounters:   02/11/21 64     Plan of care from previous visit on 2/11/21: Essential hypertension  Blood pressure well controlled at goal.  Continue lisinopril.  Continue with his lifestyle habits including his exercise.  I would like to see him lose another 10 lb.  I would like him to check his blood pressures once a month or so and report in to us this summer on his home readings.     At last visit, patient had complaints of these symptoms:none.    Patient verbalized understanding of the directions provided.  Patient agrees to call the clinic and ask for the RN if any questions arise: Yes    Next office visit is scheduled for: 2/17/2022  Next Nurse BP visit scheduled: No  Current BP Medications are: lisinopril 20 mg daily    Today's Blood Pressure 124/78 and pulse 64.    Plan of Care:  Pt does not regularly check his blood pressure at home.  No changes to plan of care today.       increase physical activity to a goal of 5 days per week for 30 minutes of MODERATE intensity PLUS 2 days per week of FULL BODY resistance training  Goal protein intake of 60-80 grams per day, 5-10 servings of fruits and vegetables per day and 5515-8180 calories per day  Follow up in approximately 2 months with Non-Surgical Physician/Advanced Practitioner  Subjective:   Chief Complaint   Patient presents with    Follow-up     mwm fu       Patient ID: Lidia Garrison  is a 44 y o  female with excess weight/obesity here to pursue weight management  Patient is pursuing Conservative Program      HPI     Pt presents for follow up  Last visit she was started on topamax 50mg BID  Tolerating well no side effects  She noticed insomnia at first but she stopped taking her daily mid day nap and that helped  She lost -2 3lbs but she feels like she lost more on her scale as she was 184lb at home recently  She is still using partial MR/ PS  Walks 30-45min 3-4 times a week     The following portions of the patient's history were reviewed and updated as appropriate: allergies, current medications, past family history, past medical history, past social history, past surgical history and problem list     Review of Systems   Constitutional: Negative for activity change and appetite change  Respiratory: Negative  Cardiovascular: Negative  Gastrointestinal: Negative  Genitourinary: Negative  Musculoskeletal: Negative for arthralgias  Skin: Negative for rash  Psychiatric/Behavioral: Negative  Objective:    /76 (BP Location: Left arm, Patient Position: Sitting, Cuff Size: Large)   Pulse 85   Temp 98 8 °F (37 1 °C) (Tympanic)   Resp 18   Ht 5' 4" (1 626 m)   Wt 85 5 kg (188 lb 9 6 oz)   BMI 32 37 kg/m²      Physical Exam    Constitutional   General appearance: Abnormal   well developed and obese  Eyes No conjunctival pallor     Ears, Nose, Mouth, and Throat Oral mucosa moist  Pulmonary   Respiratory effort: No increased work of breathing or signs of respiratory distress  Auscultation of lungs: Clear to auscultation, equal breath sounds bilaterally, no wheezes, no rales, no rhonci  Cardiovascular   Auscultation of heart: Normal rate and rhythm, normal S1 and S2, without murmurs  Examination of extremities for edema and/or varicosities: Normal   no edema  Abdomen   Abdomen: Abnormal   The abdomen was obese  Bowel sounds were normal  The abdomen was soft and nontender     Musculoskeletal   Gait and station: Normal     Psychiatric   Orientation to person, place and time: Normal     Affect: appropriate Yes